# Patient Record
Sex: MALE | Race: WHITE | Employment: UNEMPLOYED | URBAN - METROPOLITAN AREA
[De-identification: names, ages, dates, MRNs, and addresses within clinical notes are randomized per-mention and may not be internally consistent; named-entity substitution may affect disease eponyms.]

---

## 2018-03-09 ENCOUNTER — HOSPITAL ENCOUNTER (OUTPATIENT)
Dept: OTHER | Age: 83
Discharge: HOME | End: 2018-03-09
Attending: INTERNAL MEDICINE | Admitting: INTERNAL MEDICINE

## 2018-03-09 DIAGNOSIS — M53.3 SACRAL PAIN: ICD-10-CM

## 2019-07-11 ENCOUNTER — HOSPITAL ENCOUNTER (INPATIENT)
Age: 84
LOS: 9 days | Discharge: HOME | DRG: 949 | End: 2019-07-20
Attending: PHYSICAL MEDICINE & REHABILITATION | Admitting: PHYSICAL MEDICINE & REHABILITATION
Payer: MEDICARE

## 2019-07-11 PROBLEM — S14.129A CENTRAL CORD SYND AT UNSP LEVEL OF CERV SPINAL CORD, INIT (HCC): Status: ACTIVE | Noted: 2019-07-11

## 2019-07-11 LAB
GLUCOSE BLD-MCNC: 127 MG/DL (ref 70–99)
GLUCOSE BLD-MCNC: 231 MG/DL (ref 70–99)
PERFORMED ON: ABNORMAL
PERFORMED ON: ABNORMAL

## 2019-07-11 PROCEDURE — 6370000000 HC RX 637 (ALT 250 FOR IP): Performed by: PHYSICAL MEDICINE & REHABILITATION

## 2019-07-11 PROCEDURE — 6360000002 HC RX W HCPCS: Performed by: PHYSICAL MEDICINE & REHABILITATION

## 2019-07-11 PROCEDURE — 1280000000 HC REHAB R&B

## 2019-07-11 RX ORDER — HYDROCHLOROTHIAZIDE 25 MG/1
25 TABLET ORAL DAILY
COMMUNITY

## 2019-07-11 RX ORDER — DEXTROSE MONOHYDRATE 50 MG/ML
100 INJECTION, SOLUTION INTRAVENOUS PRN
Status: DISCONTINUED | OUTPATIENT
Start: 2019-07-11 | End: 2019-07-20 | Stop reason: HOSPADM

## 2019-07-11 RX ORDER — SIMVASTATIN 40 MG
80 TABLET ORAL NIGHTLY
Status: DISCONTINUED | OUTPATIENT
Start: 2019-07-11 | End: 2019-07-20 | Stop reason: HOSPADM

## 2019-07-11 RX ORDER — CITALOPRAM 20 MG/1
20 TABLET ORAL DAILY
Status: DISCONTINUED | OUTPATIENT
Start: 2019-07-12 | End: 2019-07-20 | Stop reason: HOSPADM

## 2019-07-11 RX ORDER — NICOTINE POLACRILEX 4 MG
15 LOZENGE BUCCAL PRN
Status: DISCONTINUED | OUTPATIENT
Start: 2019-07-11 | End: 2019-07-20 | Stop reason: HOSPADM

## 2019-07-11 RX ORDER — POLYETHYLENE GLYCOL 3350 17 G/17G
17 POWDER, FOR SOLUTION ORAL DAILY PRN
Status: DISCONTINUED | OUTPATIENT
Start: 2019-07-11 | End: 2019-07-17

## 2019-07-11 RX ORDER — ASPIRIN 325 MG
325 TABLET ORAL DAILY
COMMUNITY
End: 2021-12-24

## 2019-07-11 RX ORDER — ALBUTEROL SULFATE 2.5 MG/3ML
2.5 SOLUTION RESPIRATORY (INHALATION) 2 TIMES DAILY PRN
COMMUNITY

## 2019-07-11 RX ORDER — METHOCARBAMOL 750 MG/1
750 TABLET, FILM COATED ORAL 3 TIMES DAILY PRN
Status: DISCONTINUED | OUTPATIENT
Start: 2019-07-11 | End: 2019-07-20 | Stop reason: HOSPADM

## 2019-07-11 RX ORDER — SENNA AND DOCUSATE SODIUM 50; 8.6 MG/1; MG/1
1 TABLET, FILM COATED ORAL 2 TIMES DAILY
COMMUNITY
End: 2021-12-24

## 2019-07-11 RX ORDER — METOPROLOL SUCCINATE 25 MG/1
25 TABLET, EXTENDED RELEASE ORAL DAILY
Status: DISCONTINUED | OUTPATIENT
Start: 2019-07-11 | End: 2019-07-20 | Stop reason: HOSPADM

## 2019-07-11 RX ORDER — LANOLIN ALCOHOL/MO/W.PET/CERES
3 CREAM (GRAM) TOPICAL NIGHTLY PRN
COMMUNITY

## 2019-07-11 RX ORDER — VALSARTAN 80 MG/1
80 TABLET ORAL DAILY
Status: DISCONTINUED | OUTPATIENT
Start: 2019-07-12 | End: 2019-07-13

## 2019-07-11 RX ORDER — CHLORHEXIDINE GLUCONATE 0.12 MG/ML
15 RINSE ORAL 2 TIMES DAILY
Status: ON HOLD | COMMUNITY
End: 2019-07-11

## 2019-07-11 RX ORDER — CANDESARTAN 32 MG/1
32 TABLET ORAL NIGHTLY
Status: ON HOLD | COMMUNITY
End: 2019-07-19 | Stop reason: HOSPADM

## 2019-07-11 RX ORDER — PIOGLITAZONEHYDROCHLORIDE 45 MG/1
45 TABLET ORAL DAILY
Status: ON HOLD | COMMUNITY
End: 2019-07-11

## 2019-07-11 RX ORDER — OXYCODONE HYDROCHLORIDE AND ACETAMINOPHEN 5; 325 MG/1; MG/1
2 TABLET ORAL EVERY 4 HOURS PRN
Status: ON HOLD | COMMUNITY
End: 2019-07-19 | Stop reason: HOSPADM

## 2019-07-11 RX ORDER — DOCUSATE SODIUM 100 MG/1
100 CAPSULE, LIQUID FILLED ORAL 2 TIMES DAILY
Status: DISCONTINUED | OUTPATIENT
Start: 2019-07-11 | End: 2019-07-20 | Stop reason: HOSPADM

## 2019-07-11 RX ORDER — OXYCODONE HYDROCHLORIDE 5 MG/1
5 TABLET ORAL EVERY 6 HOURS PRN
Status: DISCONTINUED | OUTPATIENT
Start: 2019-07-11 | End: 2019-07-20 | Stop reason: HOSPADM

## 2019-07-11 RX ORDER — HYDROCHLOROTHIAZIDE 25 MG/1
25 TABLET ORAL DAILY
Status: DISCONTINUED | OUTPATIENT
Start: 2019-07-12 | End: 2019-07-20 | Stop reason: HOSPADM

## 2019-07-11 RX ORDER — AMLODIPINE BESYLATE 10 MG/1
10 TABLET ORAL NIGHTLY
Status: DISCONTINUED | OUTPATIENT
Start: 2019-07-11 | End: 2019-07-20 | Stop reason: HOSPADM

## 2019-07-11 RX ORDER — LANOLIN ALCOHOL/MO/W.PET/CERES
3 CREAM (GRAM) TOPICAL NIGHTLY PRN
Status: DISCONTINUED | OUTPATIENT
Start: 2019-07-11 | End: 2019-07-20 | Stop reason: HOSPADM

## 2019-07-11 RX ORDER — METHOCARBAMOL 500 MG/1
500-1000 TABLET, FILM COATED ORAL EVERY 6 HOURS PRN
Status: ON HOLD | COMMUNITY
End: 2019-07-19 | Stop reason: SDUPTHER

## 2019-07-11 RX ORDER — ATENOLOL 50 MG/1
50 TABLET ORAL DAILY
Status: ON HOLD | COMMUNITY
End: 2019-07-11

## 2019-07-11 RX ORDER — TRAMADOL HYDROCHLORIDE 50 MG/1
50 TABLET ORAL EVERY 6 HOURS PRN
Status: DISCONTINUED | OUTPATIENT
Start: 2019-07-11 | End: 2019-07-11

## 2019-07-11 RX ORDER — AMLODIPINE BESYLATE 10 MG/1
10 TABLET ORAL NIGHTLY
COMMUNITY

## 2019-07-11 RX ORDER — HEPARIN SODIUM 5000 [USP'U]/ML
5000 INJECTION, SOLUTION INTRAVENOUS; SUBCUTANEOUS EVERY 8 HOURS SCHEDULED
Status: DISCONTINUED | OUTPATIENT
Start: 2019-07-11 | End: 2019-07-20 | Stop reason: HOSPADM

## 2019-07-11 RX ORDER — DEXTROSE MONOHYDRATE 25 G/50ML
12.5 INJECTION, SOLUTION INTRAVENOUS PRN
Status: DISCONTINUED | OUTPATIENT
Start: 2019-07-11 | End: 2019-07-20 | Stop reason: HOSPADM

## 2019-07-11 RX ORDER — OXYCODONE HYDROCHLORIDE 10 MG/1
10 TABLET ORAL EVERY 6 HOURS PRN
Status: DISCONTINUED | OUTPATIENT
Start: 2019-07-11 | End: 2019-07-20 | Stop reason: HOSPADM

## 2019-07-11 RX ORDER — BISACODYL 10 MG
10 SUPPOSITORY, RECTAL RECTAL DAILY PRN
Status: DISCONTINUED | OUTPATIENT
Start: 2019-07-11 | End: 2019-07-20 | Stop reason: HOSPADM

## 2019-07-11 RX ORDER — CITALOPRAM 20 MG/1
20 TABLET ORAL DAILY
COMMUNITY

## 2019-07-11 RX ORDER — ACETAMINOPHEN 325 MG/1
650 TABLET ORAL EVERY 6 HOURS PRN
Status: DISCONTINUED | OUTPATIENT
Start: 2019-07-11 | End: 2019-07-20 | Stop reason: HOSPADM

## 2019-07-11 RX ORDER — SIMVASTATIN 80 MG
80 TABLET ORAL NIGHTLY
COMMUNITY

## 2019-07-11 RX ORDER — ONDANSETRON 4 MG/1
4 TABLET, FILM COATED ORAL EVERY 8 HOURS PRN
Status: DISCONTINUED | OUTPATIENT
Start: 2019-07-11 | End: 2019-07-20 | Stop reason: HOSPADM

## 2019-07-11 RX ADMIN — HEPARIN SODIUM 5000 UNITS: 5000 INJECTION INTRAVENOUS; SUBCUTANEOUS at 22:02

## 2019-07-11 RX ADMIN — OXYCODONE HYDROCHLORIDE 5 MG: 5 TABLET ORAL at 22:10

## 2019-07-11 RX ADMIN — METOPROLOL TARTRATE 12.5 MG: 25 TABLET ORAL at 22:09

## 2019-07-11 RX ADMIN — OXYCODONE HYDROCHLORIDE 5 MG: 5 TABLET ORAL at 15:30

## 2019-07-11 RX ADMIN — SIMVASTATIN 80 MG: 40 TABLET, FILM COATED ORAL at 22:10

## 2019-07-11 RX ADMIN — AMLODIPINE BESYLATE 10 MG: 10 TABLET ORAL at 22:09

## 2019-07-11 RX ADMIN — DOCUSATE SODIUM 100 MG: 100 CAPSULE, LIQUID FILLED ORAL at 22:10

## 2019-07-11 RX ADMIN — HEPARIN SODIUM 5000 UNITS: 5000 INJECTION INTRAVENOUS; SUBCUTANEOUS at 15:30

## 2019-07-11 RX ADMIN — INSULIN LISPRO 1 UNITS: 100 INJECTION, SOLUTION INTRAVENOUS; SUBCUTANEOUS at 22:02

## 2019-07-11 ASSESSMENT — PAIN DESCRIPTION - PAIN TYPE
TYPE: SURGICAL PAIN
TYPE: ACUTE PAIN

## 2019-07-11 ASSESSMENT — PAIN DESCRIPTION - LOCATION
LOCATION: NECK
LOCATION: HEAD

## 2019-07-11 ASSESSMENT — PAIN DESCRIPTION - PROGRESSION
CLINICAL_PROGRESSION: NOT CHANGED

## 2019-07-11 ASSESSMENT — PAIN SCALES - GENERAL
PAINLEVEL_OUTOF10: 0
PAINLEVEL_OUTOF10: 4
PAINLEVEL_OUTOF10: 5
PAINLEVEL_OUTOF10: 5

## 2019-07-11 ASSESSMENT — PAIN DESCRIPTION - ORIENTATION
ORIENTATION: RIGHT
ORIENTATION: LOWER

## 2019-07-11 ASSESSMENT — PAIN DESCRIPTION - ONSET
ONSET: ON-GOING
ONSET: ON-GOING

## 2019-07-11 ASSESSMENT — PAIN DESCRIPTION - FREQUENCY
FREQUENCY: CONTINUOUS
FREQUENCY: CONTINUOUS

## 2019-07-11 NOTE — PROGRESS NOTES
Patient admitted to room 3259. Patient was oriented to the Call Light, Phone, TV, Thermostat, Bed Controls, Bathroom and Emergency Cord. Patient verbalized and demonstrated understanding of all. Patient was also given an over view of Unit Routines for Acute Rehab, including what to wear for therapy. The patient's role in goal setting was reviewed along with an explanation of the Interdisciplinary Team meeting, the 's role in coordinating services and the Discharge Planning/Continuum of Care process. Patient Rights and Responsibilities were reviewed. Meal times were explained, including how to order food. The white board (used for communication) was pointed out emphasizing  the 3 hours/day Therapy Schedule (posted most evenings), the number (and process) for reporting grievances, and the Doctor's, Nurse's, and PCA's names. It was recommended that any family that will be care givers or any care givers the patient has, take part in therapy. There are no set visiting hours, and it was suggested that non-caregiver friends and family visitors come after therapy (at 4 PM or later) to allow patient to rest in between sessions.

## 2019-07-11 NOTE — PROGRESS NOTES
RN called  Dr Mary Cuevas office to clarify orders for [facility] J collar. RN notified Dr Kory York how orders were written in discharge orders were correct and patient notified. [place] J collar is place if patient HOB >30 or up and ambulating or in chair. Patient may remove collar if HOB is less than 30 degrees.

## 2019-07-11 NOTE — H&P
Department of Physical Medicine & Rehabilitation  History & Physical      Patient Identification:  Katy Heart  : 1930  Admit date: 2019   Attending provider: Elisabet Kern MD        Primary care provider: Kenrick Gunderson MD     Chief Complaint: RUE weakness    History of Present Illness/Hospital Course:  Mr. Katy Heart is an 79 yo M with pmh HTN, HLD, and DM2 who initially presented to [address] [place] [address] on 2019 with upper extremity weakness/paresthesias and urinary retention following a ground level fall. Patient was working in garden when he lost his balance and fall, striking his head on a step, resulting in hyperextension at the neck. He denied loss of consciousness. Imaging revealed type II dens fracture with 7mm posterior displacement and cord impingement, C1 left posterior arch and laminar fractures, and suspected dissection/thrombus in right vertebral artery V3 segment. He underwent occiput-C4 fusion ( with Dr. Mary Cuevas). Post-op course notable for pulmonary edema requiring diuresis, new onset dysphagia, and significant troponin elevation/possible NSTEMI (managed medically). Now presents to ARU with impaired mobility and self-care below his baseline. Currently, he reports improvement in upper extremity weakness/paresthesias. He feels his LUE has returned to baseline but he has persistent abnormal sensation in the right hand, weakness throughout the right arm, and difficulty with fine motor coordination. He reports poor balance but no focal leg weakness or numbness. He has moderate neck pain with radiation to the right shoulder. Described mostly as \"tightness. \" He is urinating volitionally but reports frequency. He has not had BM in at least 2 days.  He is motivated to start inpatient rehab program.     Prior Level of Function:  Independent for mobility, ADLs, and IADLs    Current Level of Function:  Min-total assist    Pertinent Social History:  Support: Lives with wife (can provide  capsule 100 mg  100 mg Oral BID Amara Daniels MD        polyethylene glycol [facility]) packet 17 g  17 g Oral Daily PRN Amara Daniels MD        magnesium hydroxide (MILK OF MAGNESIA) 400 MG/5ML suspension 30 mL  30 mL Oral Daily PRN Amara Daniels MD        oxyCODONE (ROXICODONE) immediate release tablet 5 mg  5 mg Oral Q6H PRN Amara Daniels MD        oxyCODONE HCl (OXY-IR) immediate release tablet 10 mg  10 mg Oral Q6H PRN Amara Daniels MD        methocarbamol (ROBAXIN) tablet 750 mg  750 mg Oral TID PRN Aamra Daniels MD             REVIEW OF SYSTEMS:   CONSTITUTIONAL: negative for fevers, chills, diaphoresis, appetite change, night sweats, unexpected weight change, +fatigue. EYES: negative for blurred vision, eye discharge, visual disturbance and icterus. HEENT: negative for hearing loss, tinnitus, ear drainage, sinus pressure, nasal congestion, epistaxis and snoring. RESPIRATORY: Negative for hemoptysis, cough, sputum production. CARDIOVASCULAR: negative for chest pain, palpitations, exertional chest pressure/discomfort, syncope, +edema   GASTROINTESTINAL: negative for nausea, vomiting, diarrhea, blood in stool, abdominal pain, +constipation. GENITOURINARY: refer to HPI  HEMATOLOGIC/LYMPHATIC: negative for easy bruising, bleeding and lymphadenopathy. ALLERGIC/IMMUNOLOGIC: negative for recurrent infections, angioedema, anaphylaxis and drug reactions. ENDOCRINE: negative for weight changes and diabetic symptoms including polyuria, polydipsia and polyphagia. MUSCULOSKELETAL: refer to HPI  NEUROLOGICAL: refer to HPI  PSYCHIATRIC/BEHAVIORAL: negative for hallucinations, behavioral problems, confusion and agitation. All pertinent positives are noted in the HPI.     Physical Examination:  Vitals:   Patient Vitals for the past 24 hrs:   BP Temp Temp src Pulse Resp SpO2 Height Weight   07/11/19 1515 (!) 163/65 97.4 °F (36.3 °C) Oral 57 16 93 % 6' 1\" (1.854 m) 191 lb function was normal. The estimated ejection    fraction was in the range of 50% to 55%. The tissue Doppler    parameters were abnormal. The study is not technically sufficient    to allow evaluation of LV diastolic function. Evidence of elevated    left ventricular end diastolic pressure.  - Regional wall motion abnormality: Mild hypokinesis of the mid    anteroseptal myocardium.  - Aortic valve: Mild regurgitation.  - Aortic root: The aortic root was normal in size. The ascending  Edwin Broaden is at the upper limit of normal at 3.5 cm. - Left atrium: The atrium was mildly dilated. No right to left shunt    with contrast.  - Right ventricle: The cavity size was at the upper limits of    normal. Systolic function was normal. TAPSE: 2.4cm.  - Right atrium: The atrium was mildly dilated. - Pulmonary arteries: Systolic pressure could not be accurately    estimated. - Inferior vena cava: The vessel was dilated. - Pericardium, extracardiac: There was a left pleural effusion. EKG    NORMAL SINUS RHYTHM ^ NONSPECIFIC ST-T WAVE CHANGES ^ ABNORMAL ECG ^ COMPARED TO THE ECG OF 09-JUL-2019 06:28, ^ THERE IS NO SIGNIFICANT CHANGE ^ Confirmed by Lindy Bynum MD ([phone]) on 7/10/2019 9:46:46 AM      Body mass index is 25.22 kg/m². POST ADMISSION PHYSICIAN EVALUATION  The patient has agreed to being admitted to our comprehensive inpatient  rehabilitation facility consisting of at least 180 minutes of therapy a day,  5 out of 7 days a week. The patient/family has a good understanding of our discharge process. The  patient has potential to make improvement and is in need of at least two of  the following multidisciplinary therapies including but not limited to  physical, occupational, respiratory, and speech, nutritional services, wound care, and prosthetics and orthotics.  Given the patients complex condition  and risk of further medical complications, rehabilitation services cannot be  safely provided at a lower level of care BID, PRN miralax and MoM. follow bowel movements. Enema or suppository if needed. Pain control - prn Percocet and methocarbamol    Safety - fall and aspiration precautions    PPx  DVT PPx - heparin    FULL CODE    Luis Cifuentes MD 7/11/2019, 3:26 PM

## 2019-07-12 LAB
ANION GAP SERPL CALCULATED.3IONS-SCNC: 10 MMOL/L (ref 3–16)
BASOPHILS ABSOLUTE: 0.1 K/UL (ref 0–0.2)
BASOPHILS RELATIVE PERCENT: 1.1 %
BUN BLDV-MCNC: 29 MG/DL (ref 7–20)
CALCIUM SERPL-MCNC: 9.1 MG/DL (ref 8.3–10.6)
CHLORIDE BLD-SCNC: 100 MMOL/L (ref 99–110)
CO2: 27 MMOL/L (ref 21–32)
CREAT SERPL-MCNC: 0.8 MG/DL (ref 0.8–1.3)
EOSINOPHILS ABSOLUTE: 0.8 K/UL (ref 0–0.6)
EOSINOPHILS RELATIVE PERCENT: 8.7 %
GFR AFRICAN AMERICAN: >60
GFR NON-AFRICAN AMERICAN: >60
GLUCOSE BLD-MCNC: 150 MG/DL (ref 70–99)
GLUCOSE BLD-MCNC: 157 MG/DL (ref 70–99)
GLUCOSE BLD-MCNC: 161 MG/DL (ref 70–99)
GLUCOSE BLD-MCNC: 174 MG/DL (ref 70–99)
GLUCOSE BLD-MCNC: 249 MG/DL (ref 70–99)
HCT VFR BLD CALC: 40.4 % (ref 40.5–52.5)
HEMOGLOBIN: 13.5 G/DL (ref 13.5–17.5)
LYMPHOCYTES ABSOLUTE: 1.3 K/UL (ref 1–5.1)
LYMPHOCYTES RELATIVE PERCENT: 14.1 %
MCH RBC QN AUTO: 31.4 PG (ref 26–34)
MCHC RBC AUTO-ENTMCNC: 33.5 G/DL (ref 31–36)
MCV RBC AUTO: 93.8 FL (ref 80–100)
MONOCYTES ABSOLUTE: 0.9 K/UL (ref 0–1.3)
MONOCYTES RELATIVE PERCENT: 9.4 %
NEUTROPHILS ABSOLUTE: 6.2 K/UL (ref 1.7–7.7)
NEUTROPHILS RELATIVE PERCENT: 66.7 %
PDW BLD-RTO: 12.6 % (ref 12.4–15.4)
PERFORMED ON: ABNORMAL
PLATELET # BLD: 275 K/UL (ref 135–450)
PMV BLD AUTO: 7.7 FL (ref 5–10.5)
POTASSIUM REFLEX MAGNESIUM: 4 MMOL/L (ref 3.5–5.1)
PREALBUMIN: 14.6 MG/DL (ref 20–40)
RBC # BLD: 4.3 M/UL (ref 4.2–5.9)
SODIUM BLD-SCNC: 137 MMOL/L (ref 136–145)
WBC # BLD: 9.3 K/UL (ref 4–11)

## 2019-07-12 PROCEDURE — 92523 SPEECH SOUND LANG COMPREHEN: CPT

## 2019-07-12 PROCEDURE — 97110 THERAPEUTIC EXERCISES: CPT

## 2019-07-12 PROCEDURE — 80048 BASIC METABOLIC PNL TOTAL CA: CPT

## 2019-07-12 PROCEDURE — 1280000000 HC REHAB R&B

## 2019-07-12 PROCEDURE — 97162 PT EVAL MOD COMPLEX 30 MIN: CPT

## 2019-07-12 PROCEDURE — 94760 N-INVAS EAR/PLS OXIMETRY 1: CPT

## 2019-07-12 PROCEDURE — 92526 ORAL FUNCTION THERAPY: CPT

## 2019-07-12 PROCEDURE — 97535 SELF CARE MNGMENT TRAINING: CPT

## 2019-07-12 PROCEDURE — 97530 THERAPEUTIC ACTIVITIES: CPT

## 2019-07-12 PROCEDURE — 84134 ASSAY OF PREALBUMIN: CPT

## 2019-07-12 PROCEDURE — 36415 COLL VENOUS BLD VENIPUNCTURE: CPT

## 2019-07-12 PROCEDURE — 6370000000 HC RX 637 (ALT 250 FOR IP): Performed by: PHYSICAL MEDICINE & REHABILITATION

## 2019-07-12 PROCEDURE — 97116 GAIT TRAINING THERAPY: CPT

## 2019-07-12 PROCEDURE — 6360000002 HC RX W HCPCS: Performed by: PHYSICAL MEDICINE & REHABILITATION

## 2019-07-12 PROCEDURE — 92610 EVALUATE SWALLOWING FUNCTION: CPT

## 2019-07-12 PROCEDURE — 97166 OT EVAL MOD COMPLEX 45 MIN: CPT

## 2019-07-12 PROCEDURE — 85025 COMPLETE CBC W/AUTO DIFF WBC: CPT

## 2019-07-12 RX ORDER — METFORMIN HYDROCHLORIDE EXTENDED-RELEASE TABLETS 1000 MG/1
1000 TABLET, FILM COATED, EXTENDED RELEASE ORAL
COMMUNITY

## 2019-07-12 RX ORDER — METOPROLOL SUCCINATE 50 MG/1
50 TABLET, EXTENDED RELEASE ORAL DAILY
COMMUNITY
End: 2022-10-27

## 2019-07-12 RX ORDER — METFORMIN HYDROCHLORIDE 500 MG/1
1000 TABLET, EXTENDED RELEASE ORAL
Status: DISCONTINUED | OUTPATIENT
Start: 2019-07-12 | End: 2019-07-20 | Stop reason: HOSPADM

## 2019-07-12 RX ADMIN — DOCUSATE SODIUM 100 MG: 100 CAPSULE, LIQUID FILLED ORAL at 20:08

## 2019-07-12 RX ADMIN — METOPROLOL SUCCINATE 25 MG: 25 TABLET, FILM COATED, EXTENDED RELEASE ORAL at 07:33

## 2019-07-12 RX ADMIN — AMLODIPINE BESYLATE 10 MG: 10 TABLET ORAL at 20:08

## 2019-07-12 RX ADMIN — HEPARIN SODIUM 5000 UNITS: 5000 INJECTION INTRAVENOUS; SUBCUTANEOUS at 12:25

## 2019-07-12 RX ADMIN — ASPIRIN 325 MG: 325 TABLET, DELAYED RELEASE ORAL at 07:33

## 2019-07-12 RX ADMIN — Medication 3 MG: at 22:21

## 2019-07-12 RX ADMIN — METHOCARBAMOL TABLETS 750 MG: 750 TABLET, COATED ORAL at 17:16

## 2019-07-12 RX ADMIN — INSULIN LISPRO 1 UNITS: 100 INJECTION, SOLUTION INTRAVENOUS; SUBCUTANEOUS at 21:09

## 2019-07-12 RX ADMIN — VALSARTAN 80 MG: 80 TABLET, FILM COATED ORAL at 11:33

## 2019-07-12 RX ADMIN — INSULIN LISPRO 1 UNITS: 100 INJECTION, SOLUTION INTRAVENOUS; SUBCUTANEOUS at 07:34

## 2019-07-12 RX ADMIN — SIMVASTATIN 80 MG: 40 TABLET, FILM COATED ORAL at 20:08

## 2019-07-12 RX ADMIN — INSULIN LISPRO 1 UNITS: 100 INJECTION, SOLUTION INTRAVENOUS; SUBCUTANEOUS at 17:13

## 2019-07-12 RX ADMIN — DOCUSATE SODIUM 100 MG: 100 CAPSULE, LIQUID FILLED ORAL at 07:33

## 2019-07-12 RX ADMIN — METHOCARBAMOL TABLETS 750 MG: 750 TABLET, COATED ORAL at 05:51

## 2019-07-12 RX ADMIN — HEPARIN SODIUM 5000 UNITS: 5000 INJECTION INTRAVENOUS; SUBCUTANEOUS at 05:46

## 2019-07-12 RX ADMIN — METFORMIN HYDROCHLORIDE 1000 MG: 500 TABLET, EXTENDED RELEASE ORAL at 07:34

## 2019-07-12 RX ADMIN — HEPARIN SODIUM 5000 UNITS: 5000 INJECTION INTRAVENOUS; SUBCUTANEOUS at 21:09

## 2019-07-12 RX ADMIN — CITALOPRAM HYDROBROMIDE 20 MG: 20 TABLET ORAL at 07:34

## 2019-07-12 RX ADMIN — INSULIN LISPRO 1 UNITS: 100 INJECTION, SOLUTION INTRAVENOUS; SUBCUTANEOUS at 12:25

## 2019-07-12 RX ADMIN — OXYCODONE HYDROCHLORIDE 10 MG: 10 TABLET ORAL at 20:09

## 2019-07-12 RX ADMIN — HYDROCHLOROTHIAZIDE 25 MG: 25 TABLET ORAL at 07:34

## 2019-07-12 ASSESSMENT — 9 HOLE PEG TEST
TESTTIME_SECONDS: 30.35
TEST_RESULT: IMPAIRED
TESTTIME_SECONDS: 33.2
TEST_RESULT: FUNCTIONAL

## 2019-07-12 ASSESSMENT — PAIN DESCRIPTION - LOCATION: LOCATION: HEAD;SHOULDER

## 2019-07-12 ASSESSMENT — PAIN DESCRIPTION - FREQUENCY: FREQUENCY: CONTINUOUS

## 2019-07-12 ASSESSMENT — PAIN - FUNCTIONAL ASSESSMENT: PAIN_FUNCTIONAL_ASSESSMENT: ACTIVITIES ARE NOT PREVENTED

## 2019-07-12 ASSESSMENT — PAIN DESCRIPTION - PROGRESSION
CLINICAL_PROGRESSION: NOT CHANGED

## 2019-07-12 ASSESSMENT — PAIN DESCRIPTION - PAIN TYPE: TYPE: ACUTE PAIN

## 2019-07-12 ASSESSMENT — PAIN DESCRIPTION - ONSET: ONSET: ON-GOING

## 2019-07-12 ASSESSMENT — PAIN SCALES - GENERAL: PAINLEVEL_OUTOF10: 8

## 2019-07-12 ASSESSMENT — PAIN DESCRIPTION - ORIENTATION: ORIENTATION: RIGHT

## 2019-07-12 NOTE — PROGRESS NOTES
cervical restrictions and wearing neck brace    3. Pt appears clinical comparable to previous DX Dysphagia work up documentation of findings. This SLP will review the MBSS documentation again to be sure. Dysphagia Outcome Severity Scale: Level 3: Moderate dysphagia- Total assistance, supervision or strategies. Two or more diet consistencies restricted     Treatment Plan  Requires SLP Intervention: Yes  Duration/Frequency of Treatment: 5 times/week while on rehab unit and additional recommendations at d/c may be warranted   D/C Recommendations: To be determined(anticipate potential need for therapy at d/c)       Recommended Diet and Intervention  Diet Solids Recommendation: Dysphagia II Mechanically Altered  Liquid Consistency Recommendation: Nectar     Recommendations: Dysphagia treatment  Therapeutic Interventions: Bolus control exercises; Laryngeal exercises; Patient/Family education; Therapeutic PO trials with SLP; Diet tolerance monitoring;Linda; Tongue base strengthening;Effortful swallow    Compensatory Swallowing Strategies  Alternate solids and liquids;Upright as possible for all oral intake;Small bites/sips;Swallow 2 times per bite/sip; Remain upright for 30-45 minutes after meals;Effortful swallow; Modified supraglottic swallow     Treatment/Goals  Dysphagia Goals: The patient will tolerate dysphagia II mechanical soft with nectar thick diet without observed clinical signs of aspiration; The patient will improve oral preparation phase via bolus control/manipulation exercises to 5/5 each trial.;  The patient will recall and perform compensatory strategies, with no cues. The patient will complete tongue base and laryngeal elevation exercises 15/15;    The patient will tolerate skilled therapy trials of thin liquids and/or dental soft foods with compensatory strategies 15/15 without clinical overt s/s of penetration/aspiration    General  Chart Reviewed: Yes  Subjective  Subjective: Pt is insightful to

## 2019-07-12 NOTE — PROGRESS NOTES
function and limitations: history per pt self-report and chart review   General  Chart Reviewed: Yes  Family / Caregiver Present: No  Subjective  Subjective: Pt was alert and cooperative. Pt was receptive to testing but did report some fatigue. Social/Functional History  Lives With: Spouse  ADL Assistance: (split with wife)  Homemaking Assistance: (split with wife)  Homemaking Responsibilities: Yes(split with wife)  Occupation: Retired  Type of occupation:   IADL Comments: Pt involved in [employer], [address] [place] [address], cleaning and splits these responsibilities with his wife. Objective: Assessment included portions of the NCSE and a Cognitive Linguistic test    Pain:  No pain complaints     Vision  Vision: Impaired  Vision Exceptions: Wears glasses for reading  Hearing  Hearing: Within functional limits          Auditory Comprehension  Comprehension: Exceptions  Yes/No Questions: (intact)  Basic Questions: (intact)  One Step Basic Commands: (intact)  Two Step Basic Commands: (intact)  Multistep Basic Commands: (intact)  Complex/Abstract Commands: (can be impulsive but able to follow commands)    Reading Comprehension  Reading Status: Within functional limits(Ongoing monitor)  Sentence Impairment Severity: (at the 4-6 line paragraph level. )    Expression  Primary Mode of Expression: Verbal    Verbal Expression  Verbal Expression: Exceptions to functional limits  Confrontation: (15/15 short form Ministerio [employer] Ministerio)  Conversation: (IND for communicating needs and wants, able to provide complex concert explanations with intermittent clarification cues)    Written Expression  Dominant Hand: Right    Motor Speech  Motor Speech:  Within Functional Limits  Oral/Motor  Oral Motor: Exceptions to [facility]  Labial Symmetry: (fairly symmetrical)  Labial Strength: (appeared functional and symmetrical)  Labial Coordination: Reduced  Lingual ROM: (appeared fairly symmetrical)  Lingual Coordination: Reduced  Velum: (appeared symmetrical. Pt self reported his uvulae \"just hung there and had lots of mucous there\")      Pragmatics/Social Functioning  Pragmatics: Within functional limits    Cognition:    Orientation  Overall Orientation Status: Within Normal Limits(Orientation via NSCE is [facility])  Attention  Attention: Exceptions to [facility]  Alternating Attention: Mild(Ongoing assessment )  Divided Attention: Mild(Ongoing assessment )  Selective Attention: (intact on testing)  Sustained Attention: (intact on testing)  Memory  Memory: Exceptions to [facility]  Daily Routines: To be assessed in therapy  People Encountered: To be assessed in therapy  Short-term Memory: (NSCE WFL. Ongoing assessment )  Working Memory: (intact on testing)  Problem Solving  Problem Solving: Exceptions to [facility]  Complex Functional Tasks: Mild(NSCE visual constructional abilities: mild )  Managing Medications: To be assessed in therapy  Performing Discharge Planning: To be assessed in therapy  Simple Functional Tasks: (verbal intact but ongoing assessment of complex)  Verbal Reasoning Skills: To be assessed in therapy(NSCE score WFL)  Numeric Reasoning  Numeric Reasoning: Exceptions to [facility]   Calculations: (NSCE calculations [facility])  Money Management: To be assessed in therapy  Time: To be assessed in therapy  Abstract Reasoning  Abstract Reasoning: Exceptions to [facility]  Convergent Thinking: (appeared [facility] on testing)  Divergent Thinking: To be assessed in therapy  Safety/Judgement  Safety/Judgement: (To be assessed. Impulsivity noted throughout the session)    Additional Assessments:   Pt has restrictions and is wearing cervical brace        Prognosis:  Excellent    Education:  Patient Education Response: Verbalizes understanding  Safety Devices in place: Yes  Type of devices:  All fall risk precautions in place         Therapy Time:   Individual   Time In [address] [place] [address]   Time Out 1545   Minutes [facility] [facility] Graduate SLP Student    This Speech Language Pathologist was present directed the patient's care, made skilled judgement and was responsible for assessment and treatment. Bart Vegas M.S., [facility]-SLP #[id number]  Speech Language Pathologist     7/12/2019 5:25 PM

## 2019-07-12 NOTE — PROGRESS NOTES
: Yes  Mode of Transportation: Car  Occupation: Retired, Volunteer work  Type of occupation: . 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 Leisure & Hobbies: has not had much time for hobbies d/t very involved with volunteer work. Pt likes to mow the grass (3 acres). Sings in a choir. Additional Comments: Pt reports his wife leans on him when walking, he gets items for pt. He does help wife into car prn, but wife is able to drive. Pt denies any other recent falls, did fall about a year ago (fell backwards against a tree). Pt says he fell into a honeysuckle branch ~3 years ago. Cognition   Cognition  Overall Cognitive Status: WNL    Objective     Observation/Palpation  Posture: Good  Observation: óscar SUAREZ brace to cervical spine  Scar: wound close anterior/superior head but tender to touch right side with light palpation     AROM RLE (degrees)  RLE AROM: WFL  RLE General AROM: slight tightness with bilateral hamstring but able to complete with increased time   AROM LLE (degrees)  LLE AROM : WFL  LLE General AROM: slight tightness with bilateral hamstring but able to complete with increased time   AROM RUE (degrees)  RUE AROM : WFL  RUE General AROM: increased time to perform, slight limitations with shoulder elevation   AROM LUE (degrees)  LUE AROM : WFL  Strength RLE  Strength RLE: WFL  Comment: MMT performed support sitting in chair: hip flexion 4/5, hip adduction 5/5, hip abduction 4+/5, knee extension 4+/5, knee flexion 5/5, ankle DF 5/5, ankle PF 5/5. Strength LLE  Strength LLE: WFL  Comment: MMT performed support sitting in chair: hip flexion 4/5, hip adduction 5/5, hip abduction 4+/5, knee extension 4+/5, knee flexion 5/5, ankle DF 5/5, ankle PF 5/5.    Strength RUE  Strength RUE: WFL  Strength LUE  Strength LUE: WFL  Tone RLE  RLE Tone: Normotonic  Tone LLE  LLE Tone: Normotonic  Motor Control  Gross Motor?: WFL  Coordination  Rapid Alternating 3: ambulate with LRAD 200' mod I   Short term goal 4: ascend/descend 12 steps mod I with bilateral railing   Short term goal 5: ascend/descend curb step LRAD mod I   Long term goals  Time Frame for Long term goals : STG=LTG   Patient Goals   Patient goals : \"walk, be able to walk into bathroom/toilet by myself, free from brace and be normal\"       Therapy Time   Individual Concurrent Group Co-treatment   Time In 0930         Time Out 1045         Minutes 75         Timed Code Treatment Minutes: [address], PT     Electronically signed by Dami Gibson PT on 7/12/19 at 12:55 PM

## 2019-07-12 NOTE — PROGRESS NOTES
fracture. Occiput-C4 fusion 7/6  Subjective  Subjective: Pt met in room, seated EOB eating breakfast. Pt pleasant and agreeable to OT eval/ADL. Pt reports \"moderate pain caused by the brace. \"  General Comment  Comments: Pt goes by \"Vinay. \"    Social/Functional History  Social/Functional History  Lives With: Spouse(wife Merly Veliz)- has \"a walking diability. She uses crutches or a walker. \" Pt typically helps his wife with retrieving items around the house.)  Type of Home: House  Home Layout: Two level, Performs ADL's on one level, Able to Live on Main level with bedroom/bathroom(pt has a workshop in the basement (railing on right if descending))  Home Access: Stairs to enter with rails  Entrance Stairs - Number of Steps: \"a lot, half a flight to the deck, then a few steps rising up. There's an abundance of railings on both side, all over the place. \"  Entrance Stairs - Rails: Both  Bathroom Shower/Tub: Walk-in shower, Curtain, Shower chair with back(small lip to step over. Grab bar outside the shower)  Bathroom Toilet: Handicap height(no bars or vanity next to toilet. Windowsill on the right and toilet seat on left. \"Senior level height\")  Bathroom Equipment: Built-in shower seat, Shower chair(wife has a shower chair)  Bathroom Accessibility: Walker accessible, Wheelchair accessible, Accessible(2 bathrooms connected)  Home Equipment: Rolling walker, Crutches, Reacher(pt's wife has 2 RWs so pt says he could use 1. Wife has 3 sets of forearm crutches. Wife has a reacher. Pt plans to get life alert button. Sleeps in regular bed. Sits in a swivel lounging chair that is also a lift chair.)  ADL Assistance: Independent  Homemaking Assistance: Independent(does over 50% of cooking, cooks breakfast. Wife does laundry, shares heavy cleaning & wife does light cleaning. Pt manages meds & finances. Pt grocery shops.  No pets.)  Homemaking Responsibilities: Yes(split with wife)  Ambulation Assistance: Independent  Transfer Assistance: Independent  Active : Yes  Mode of Transportation: Car  Occupation: Retired, Volunteer work  Type of occupation: . 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 Leisure & Hobbies: has not had much time for hobbies d/t very involved with volunteer work. Pt likes to mow the grass (3 acres). Sings in a choir. IADL Comments: Pt involved in money making, cooking, cleaning and splits these responsibilities with his wife. Additional Comments: Pt reports his wife leans on him when walking, he gets items for pt. He does help wife into car prn, but wife is able to drive. Pt denies any other recent falls, did fall about a year ago (fell backwards against a tree). Pt says he fell into a honeysuckle branch ~3 years ago. Objective   Vision: Within Functional Limits  Vision Exceptions: Wears glasses for reading  Hearing: Within functional limits      Orientation  Overall Orientation Status: Within Functional Limits        Balance  Sitting Balance: Modified independent   Standing Balance: Contact guard assistance(RW for UB support)    Functional Mobility  Functional - Mobility Device: Rolling Walker  Activity: To/from bathroom  Assist Level: Contact guard assistance  Functional Mobility Comments: short distances in room to participate in ADLs. Pt with no LOB. PM: longer distances in dept to participate in fxl transers, steady no LOB. Toilet Transfers  Toilet - Technique: Ambulating  Equipment Used: Grab bars  Toilet Transfer: Contact guard assistance  Toilet Transfers Comments: AM session: via RW: comfort height commode. VCs for safe hand placement. Pt used grab bar on right to sit and reached with left hand back to toilet seat. Shower Transfers  Shower Transfers: Not tested  [address] [facility] Transfers Comments: LENKA this date due to pt did not have extra pads to change wet [place] J. Pt completed sponge bath this date.     Wheelchair Bed Transfers  Wheelchair/Bed - Technique:

## 2019-07-12 NOTE — PROGRESS NOTES
Potassium reflex Magnesium 4.0 3.5 - 5.1 mmol/L    Chloride 100 99 - 110 mmol/L    CO2 27 21 - 32 mmol/L    Anion Gap 10 3 - 16    Glucose 161 (H) 70 - 99 mg/dL    BUN 29 (H) 7 - 20 mg/dL    CREATININE 0.8 0.8 - 1.3 mg/dL    GFR Non-African American >60 >60    GFR African American >60 >60    Calcium 9.1 8.3 - 10.6 mg/dL   Prealbumin    Collection Time: 07/12/19  7:19 AM   Result Value Ref Range    Prealbumin 14.6 (L) 20.0 - 40.0 mg/dL   CBC auto differential    Collection Time: 07/12/19  7:19 AM   Result Value Ref Range    WBC 9.3 4.0 - 11.0 K/uL    RBC 4.30 4.20 - 5.90 M/uL    Hemoglobin 13.5 13.5 - 17.5 g/dL    Hematocrit 40.4 (L) 40.5 - 52.5 %    MCV 93.8 80.0 - 100.0 fL    MCH 31.4 26.0 - 34.0 pg    MCHC 33.5 31.0 - 36.0 g/dL    RDW 12.6 12.4 - 15.4 %    Platelets 303 790 - 789 K/uL    MPV 7.7 5.0 - 10.5 fL    Neutrophils % 66.7 %    Lymphocytes % 14.1 %    Monocytes % 9.4 %    Eosinophils % 8.7 %    Basophils % 1.1 %    Neutrophils # 6.2 1.7 - 7.7 K/uL    Lymphocytes # 1.3 1.0 - 5.1 K/uL    Monocytes # 0.9 0.0 - 1.3 K/uL    Eosinophils # 0.8 (H) 0.0 - 0.6 K/uL    Basophils # 0.1 0.0 - 0.2 K/uL   POCT Glucose    Collection Time: 07/12/19  7:24 AM   Result Value Ref Range    POC Glucose 157 (H) 70 - 99 mg/dl    Performed on ACCU-CHEK        Therapy progress:  PT  Required Braces or Orthoses  Cervical: [place] J(\"[place] J collar when HOB > 30 degreess and when Out of bed, ambulatory. \" \"If given a collar, expect to wear the collar for 6 weeks\")  Position Activity Restriction  Spinal Precautions: No Lifting, No Bending, No Twisting(\"no heavy lifting over 10 pounds\" \"no lifting of objects over your head\"  \"No bending at the waistELY [facility] with your knees. \")  Spinal Precautions: \"Walking is encoruaged. No other form of exercises until follow up appointment. \"  Other position/activity restrictions: diet: dysphagia II mechanically altered, nectar thick. \"[place] J collar when OOB or if HOB elevated >30 degrees. \" Per RN note MD 7/12/2019, 9:08 AM

## 2019-07-13 LAB
GLUCOSE BLD-MCNC: 132 MG/DL (ref 70–99)
GLUCOSE BLD-MCNC: 143 MG/DL (ref 70–99)
GLUCOSE BLD-MCNC: 150 MG/DL (ref 70–99)
GLUCOSE BLD-MCNC: 250 MG/DL (ref 70–99)
PERFORMED ON: ABNORMAL

## 2019-07-13 PROCEDURE — 97116 GAIT TRAINING THERAPY: CPT

## 2019-07-13 PROCEDURE — 94760 N-INVAS EAR/PLS OXIMETRY 1: CPT

## 2019-07-13 PROCEDURE — 97535 SELF CARE MNGMENT TRAINING: CPT

## 2019-07-13 PROCEDURE — 6370000000 HC RX 637 (ALT 250 FOR IP): Performed by: PHYSICAL MEDICINE & REHABILITATION

## 2019-07-13 PROCEDURE — 97530 THERAPEUTIC ACTIVITIES: CPT

## 2019-07-13 PROCEDURE — 6360000002 HC RX W HCPCS: Performed by: PHYSICAL MEDICINE & REHABILITATION

## 2019-07-13 PROCEDURE — 97110 THERAPEUTIC EXERCISES: CPT

## 2019-07-13 PROCEDURE — 1280000000 HC REHAB R&B

## 2019-07-13 RX ADMIN — OXYCODONE HYDROCHLORIDE 5 MG: 5 TABLET ORAL at 21:46

## 2019-07-13 RX ADMIN — METOPROLOL SUCCINATE 25 MG: 25 TABLET, FILM COATED, EXTENDED RELEASE ORAL at 07:51

## 2019-07-13 RX ADMIN — DOCUSATE SODIUM 100 MG: 100 CAPSULE, LIQUID FILLED ORAL at 21:46

## 2019-07-13 RX ADMIN — SIMVASTATIN 80 MG: 40 TABLET, FILM COATED ORAL at 21:46

## 2019-07-13 RX ADMIN — HEPARIN SODIUM 5000 UNITS: 5000 INJECTION INTRAVENOUS; SUBCUTANEOUS at 05:15

## 2019-07-13 RX ADMIN — HYDROCHLOROTHIAZIDE 25 MG: 25 TABLET ORAL at 07:52

## 2019-07-13 RX ADMIN — INSULIN LISPRO 1 UNITS: 100 INJECTION, SOLUTION INTRAVENOUS; SUBCUTANEOUS at 17:47

## 2019-07-13 RX ADMIN — INSULIN LISPRO 2 UNITS: 100 INJECTION, SOLUTION INTRAVENOUS; SUBCUTANEOUS at 21:48

## 2019-07-13 RX ADMIN — ACETAMINOPHEN 650 MG: 325 TABLET ORAL at 12:44

## 2019-07-13 RX ADMIN — CITALOPRAM HYDROBROMIDE 20 MG: 20 TABLET ORAL at 07:50

## 2019-07-13 RX ADMIN — METFORMIN HYDROCHLORIDE 1000 MG: 500 TABLET, EXTENDED RELEASE ORAL at 07:48

## 2019-07-13 RX ADMIN — AMLODIPINE BESYLATE 10 MG: 10 TABLET ORAL at 21:47

## 2019-07-13 RX ADMIN — HEPARIN SODIUM 5000 UNITS: 5000 INJECTION INTRAVENOUS; SUBCUTANEOUS at 13:38

## 2019-07-13 RX ADMIN — OXYCODONE HYDROCHLORIDE 5 MG: 5 TABLET ORAL at 13:44

## 2019-07-13 RX ADMIN — INSULIN LISPRO 1 UNITS: 100 INJECTION, SOLUTION INTRAVENOUS; SUBCUTANEOUS at 07:39

## 2019-07-13 RX ADMIN — HEPARIN SODIUM 5000 UNITS: 5000 INJECTION INTRAVENOUS; SUBCUTANEOUS at 21:47

## 2019-07-13 RX ADMIN — ACETAMINOPHEN 650 MG: 325 TABLET ORAL at 19:10

## 2019-07-13 RX ADMIN — ASPIRIN 325 MG: 325 TABLET, DELAYED RELEASE ORAL at 07:50

## 2019-07-13 RX ADMIN — DOCUSATE SODIUM 100 MG: 100 CAPSULE, LIQUID FILLED ORAL at 07:50

## 2019-07-13 RX ADMIN — METHOCARBAMOL TABLETS 750 MG: 750 TABLET, COATED ORAL at 10:40

## 2019-07-13 ASSESSMENT — PAIN DESCRIPTION - LOCATION
LOCATION: NECK;SHOULDER
LOCATION: NECK;SHOULDER
LOCATION: NECK
LOCATION: SHOULDER
LOCATION: SHOULDER
LOCATION: NECK
LOCATION: HEAD
LOCATION: NECK

## 2019-07-13 ASSESSMENT — PAIN SCALES - GENERAL
PAINLEVEL_OUTOF10: 6
PAINLEVEL_OUTOF10: 5
PAINLEVEL_OUTOF10: 0
PAINLEVEL_OUTOF10: 5
PAINLEVEL_OUTOF10: 1
PAINLEVEL_OUTOF10: 0
PAINLEVEL_OUTOF10: 3
PAINLEVEL_OUTOF10: 2
PAINLEVEL_OUTOF10: 3
PAINLEVEL_OUTOF10: 0
PAINLEVEL_OUTOF10: 6
PAINLEVEL_OUTOF10: 5
PAINLEVEL_OUTOF10: 3

## 2019-07-13 ASSESSMENT — PAIN DESCRIPTION - DESCRIPTORS
DESCRIPTORS: ACHING
DESCRIPTORS: HEAVINESS
DESCRIPTORS: SPASM

## 2019-07-13 ASSESSMENT — PAIN DESCRIPTION - PAIN TYPE
TYPE: ACUTE PAIN
TYPE: SURGICAL PAIN

## 2019-07-13 ASSESSMENT — PAIN DESCRIPTION - ORIENTATION
ORIENTATION: RIGHT
ORIENTATION: RIGHT

## 2019-07-14 LAB
GLUCOSE BLD-MCNC: 138 MG/DL (ref 70–99)
GLUCOSE BLD-MCNC: 143 MG/DL (ref 70–99)
GLUCOSE BLD-MCNC: 171 MG/DL (ref 70–99)
GLUCOSE BLD-MCNC: 204 MG/DL (ref 70–99)
PERFORMED ON: ABNORMAL

## 2019-07-14 PROCEDURE — 6370000000 HC RX 637 (ALT 250 FOR IP): Performed by: PHYSICAL MEDICINE & REHABILITATION

## 2019-07-14 PROCEDURE — 1280000000 HC REHAB R&B

## 2019-07-14 PROCEDURE — 6360000002 HC RX W HCPCS: Performed by: PHYSICAL MEDICINE & REHABILITATION

## 2019-07-14 RX ADMIN — INSULIN LISPRO 1 UNITS: 100 INJECTION, SOLUTION INTRAVENOUS; SUBCUTANEOUS at 21:41

## 2019-07-14 RX ADMIN — HEPARIN SODIUM 5000 UNITS: 5000 INJECTION INTRAVENOUS; SUBCUTANEOUS at 21:41

## 2019-07-14 RX ADMIN — INSULIN LISPRO 1 UNITS: 100 INJECTION, SOLUTION INTRAVENOUS; SUBCUTANEOUS at 18:17

## 2019-07-14 RX ADMIN — INSULIN LISPRO 1 UNITS: 100 INJECTION, SOLUTION INTRAVENOUS; SUBCUTANEOUS at 07:32

## 2019-07-14 RX ADMIN — OXYCODONE HYDROCHLORIDE 5 MG: 5 TABLET ORAL at 22:34

## 2019-07-14 RX ADMIN — AMLODIPINE BESYLATE 10 MG: 10 TABLET ORAL at 21:41

## 2019-07-14 RX ADMIN — METHOCARBAMOL TABLETS 750 MG: 750 TABLET, COATED ORAL at 21:41

## 2019-07-14 RX ADMIN — Medication 3 MG: at 21:41

## 2019-07-14 RX ADMIN — HEPARIN SODIUM 5000 UNITS: 5000 INJECTION INTRAVENOUS; SUBCUTANEOUS at 13:51

## 2019-07-14 RX ADMIN — ASPIRIN 325 MG: 325 TABLET, DELAYED RELEASE ORAL at 08:54

## 2019-07-14 RX ADMIN — CITALOPRAM HYDROBROMIDE 20 MG: 20 TABLET ORAL at 08:53

## 2019-07-14 RX ADMIN — HEPARIN SODIUM 5000 UNITS: 5000 INJECTION INTRAVENOUS; SUBCUTANEOUS at 06:01

## 2019-07-14 RX ADMIN — METFORMIN HYDROCHLORIDE 1000 MG: 500 TABLET, EXTENDED RELEASE ORAL at 07:36

## 2019-07-14 RX ADMIN — OXYCODONE HYDROCHLORIDE 5 MG: 5 TABLET ORAL at 09:47

## 2019-07-14 RX ADMIN — SIMVASTATIN 80 MG: 40 TABLET, FILM COATED ORAL at 21:41

## 2019-07-14 RX ADMIN — DOCUSATE SODIUM 100 MG: 100 CAPSULE, LIQUID FILLED ORAL at 21:41

## 2019-07-14 RX ADMIN — ACETAMINOPHEN 650 MG: 325 TABLET ORAL at 06:48

## 2019-07-14 RX ADMIN — OXYCODONE HYDROCHLORIDE 5 MG: 5 TABLET ORAL at 21:41

## 2019-07-14 RX ADMIN — DOCUSATE SODIUM 100 MG: 100 CAPSULE, LIQUID FILLED ORAL at 08:53

## 2019-07-14 RX ADMIN — ONDANSETRON HYDROCHLORIDE 4 MG: 4 TABLET, FILM COATED ORAL at 18:25

## 2019-07-14 RX ADMIN — METHOCARBAMOL TABLETS 750 MG: 750 TABLET, COATED ORAL at 07:36

## 2019-07-14 ASSESSMENT — PAIN SCALES - GENERAL
PAINLEVEL_OUTOF10: 7
PAINLEVEL_OUTOF10: 5
PAINLEVEL_OUTOF10: 3
PAINLEVEL_OUTOF10: 2
PAINLEVEL_OUTOF10: 0
PAINLEVEL_OUTOF10: 0
PAINLEVEL_OUTOF10: 3
PAINLEVEL_OUTOF10: 5
PAINLEVEL_OUTOF10: 3
PAINLEVEL_OUTOF10: 0
PAINLEVEL_OUTOF10: 4

## 2019-07-14 ASSESSMENT — PAIN DESCRIPTION - LOCATION
LOCATION: NECK
LOCATION: SHOULDER;NECK
LOCATION: NECK
LOCATION: SHOULDER

## 2019-07-14 ASSESSMENT — PAIN DESCRIPTION - DESCRIPTORS
DESCRIPTORS: ACHING
DESCRIPTORS: THROBBING
DESCRIPTORS: HEAVINESS
DESCRIPTORS: ACHING;HEAVINESS

## 2019-07-14 ASSESSMENT — PAIN DESCRIPTION - PAIN TYPE
TYPE: ACUTE PAIN

## 2019-07-14 ASSESSMENT — PAIN DESCRIPTION - ORIENTATION
ORIENTATION: RIGHT;LEFT
ORIENTATION: RIGHT;LEFT

## 2019-07-14 NOTE — PROGRESS NOTES
Pt sitting up in chair c/o 5/10 pain to his neck and requesting pain medication. Medicated with Tylenol per prn orders. Will monitor.

## 2019-07-14 NOTE — PROGRESS NOTES
Pt sitting up in chair requesting to use the BR and then get into bed. Pt s/p spinal fusion after fall in garden. C1-C4 fusion at [facility] by DR. Ej Fernández. [place] J collar in place. Pt is on Dysphagia II and nectar thick liquids diet. Lungs clear. No sob or cough. On RA. Belly round and soft with active BS. LBM yesterday. Voids urine without difficulty. 1+ edema to lower legs. Pt up from chair to BR with CGA and walker. Pt voided yellow urine and then to bed. Toilets with CGA. HOB 45 degrees. Call light in reach. Bed alarm on.

## 2019-07-15 LAB
ANION GAP SERPL CALCULATED.3IONS-SCNC: 13 MMOL/L (ref 3–16)
BASOPHILS ABSOLUTE: 0.1 K/UL (ref 0–0.2)
BASOPHILS RELATIVE PERCENT: 0.8 %
BUN BLDV-MCNC: 41 MG/DL (ref 7–20)
CALCIUM SERPL-MCNC: 8.9 MG/DL (ref 8.3–10.6)
CHLORIDE BLD-SCNC: 98 MMOL/L (ref 99–110)
CO2: 27 MMOL/L (ref 21–32)
CREAT SERPL-MCNC: 1.1 MG/DL (ref 0.8–1.3)
EOSINOPHILS ABSOLUTE: 0.9 K/UL (ref 0–0.6)
EOSINOPHILS RELATIVE PERCENT: 9.4 %
GFR AFRICAN AMERICAN: >60
GFR NON-AFRICAN AMERICAN: >60
GLUCOSE BLD-MCNC: 120 MG/DL (ref 70–99)
GLUCOSE BLD-MCNC: 122 MG/DL (ref 70–99)
GLUCOSE BLD-MCNC: 124 MG/DL (ref 70–99)
GLUCOSE BLD-MCNC: 135 MG/DL (ref 70–99)
GLUCOSE BLD-MCNC: 188 MG/DL (ref 70–99)
HCT VFR BLD CALC: 38.3 % (ref 40.5–52.5)
HEMOGLOBIN: 13.2 G/DL (ref 13.5–17.5)
LYMPHOCYTES ABSOLUTE: 1.9 K/UL (ref 1–5.1)
LYMPHOCYTES RELATIVE PERCENT: 20.5 %
MCH RBC QN AUTO: 32.1 PG (ref 26–34)
MCHC RBC AUTO-ENTMCNC: 34.5 G/DL (ref 31–36)
MCV RBC AUTO: 93.1 FL (ref 80–100)
MONOCYTES ABSOLUTE: 0.7 K/UL (ref 0–1.3)
MONOCYTES RELATIVE PERCENT: 7 %
NEUTROPHILS ABSOLUTE: 5.9 K/UL (ref 1.7–7.7)
NEUTROPHILS RELATIVE PERCENT: 62.3 %
PDW BLD-RTO: 12.7 % (ref 12.4–15.4)
PERFORMED ON: ABNORMAL
PLATELET # BLD: 318 K/UL (ref 135–450)
PMV BLD AUTO: 8 FL (ref 5–10.5)
POTASSIUM REFLEX MAGNESIUM: 3.9 MMOL/L (ref 3.5–5.1)
RBC # BLD: 4.11 M/UL (ref 4.2–5.9)
SODIUM BLD-SCNC: 138 MMOL/L (ref 136–145)
WBC # BLD: 9.5 K/UL (ref 4–11)

## 2019-07-15 PROCEDURE — 97110 THERAPEUTIC EXERCISES: CPT

## 2019-07-15 PROCEDURE — 97530 THERAPEUTIC ACTIVITIES: CPT

## 2019-07-15 PROCEDURE — 36415 COLL VENOUS BLD VENIPUNCTURE: CPT

## 2019-07-15 PROCEDURE — 92526 ORAL FUNCTION THERAPY: CPT

## 2019-07-15 PROCEDURE — 97535 SELF CARE MNGMENT TRAINING: CPT

## 2019-07-15 PROCEDURE — 97127 HC SP THER IVNTJ W/FOCUS COG FUNCJ: CPT

## 2019-07-15 PROCEDURE — 97116 GAIT TRAINING THERAPY: CPT

## 2019-07-15 PROCEDURE — 6360000002 HC RX W HCPCS: Performed by: PHYSICAL MEDICINE & REHABILITATION

## 2019-07-15 PROCEDURE — 85025 COMPLETE CBC W/AUTO DIFF WBC: CPT

## 2019-07-15 PROCEDURE — 94760 N-INVAS EAR/PLS OXIMETRY 1: CPT

## 2019-07-15 PROCEDURE — 1280000000 HC REHAB R&B

## 2019-07-15 PROCEDURE — 80048 BASIC METABOLIC PNL TOTAL CA: CPT

## 2019-07-15 PROCEDURE — 6370000000 HC RX 637 (ALT 250 FOR IP): Performed by: PHYSICAL MEDICINE & REHABILITATION

## 2019-07-15 RX ADMIN — METHOCARBAMOL TABLETS 750 MG: 750 TABLET, COATED ORAL at 08:44

## 2019-07-15 RX ADMIN — HEPARIN SODIUM 5000 UNITS: 5000 INJECTION INTRAVENOUS; SUBCUTANEOUS at 05:50

## 2019-07-15 RX ADMIN — OXYCODONE HYDROCHLORIDE 10 MG: 10 TABLET ORAL at 21:00

## 2019-07-15 RX ADMIN — INSULIN LISPRO 1 UNITS: 100 INJECTION, SOLUTION INTRAVENOUS; SUBCUTANEOUS at 20:59

## 2019-07-15 RX ADMIN — Medication 3 MG: at 21:00

## 2019-07-15 RX ADMIN — HEPARIN SODIUM 5000 UNITS: 5000 INJECTION INTRAVENOUS; SUBCUTANEOUS at 13:09

## 2019-07-15 RX ADMIN — SIMVASTATIN 80 MG: 40 TABLET, FILM COATED ORAL at 20:20

## 2019-07-15 RX ADMIN — ACETAMINOPHEN 650 MG: 325 TABLET ORAL at 08:43

## 2019-07-15 RX ADMIN — AMLODIPINE BESYLATE 10 MG: 10 TABLET ORAL at 20:21

## 2019-07-15 RX ADMIN — METOPROLOL SUCCINATE 25 MG: 25 TABLET, FILM COATED, EXTENDED RELEASE ORAL at 08:44

## 2019-07-15 RX ADMIN — HEPARIN SODIUM 5000 UNITS: 5000 INJECTION INTRAVENOUS; SUBCUTANEOUS at 20:59

## 2019-07-15 RX ADMIN — HYDROCHLOROTHIAZIDE 25 MG: 25 TABLET ORAL at 08:44

## 2019-07-15 RX ADMIN — METFORMIN HYDROCHLORIDE 1000 MG: 500 TABLET, EXTENDED RELEASE ORAL at 08:43

## 2019-07-15 RX ADMIN — METHOCARBAMOL TABLETS 750 MG: 750 TABLET, COATED ORAL at 21:00

## 2019-07-15 RX ADMIN — DOCUSATE SODIUM 100 MG: 100 CAPSULE, LIQUID FILLED ORAL at 08:44

## 2019-07-15 RX ADMIN — CITALOPRAM HYDROBROMIDE 20 MG: 20 TABLET ORAL at 08:44

## 2019-07-15 RX ADMIN — ASPIRIN 325 MG: 325 TABLET, DELAYED RELEASE ORAL at 08:44

## 2019-07-15 RX ADMIN — DOCUSATE SODIUM 100 MG: 100 CAPSULE, LIQUID FILLED ORAL at 20:20

## 2019-07-15 ASSESSMENT — PAIN DESCRIPTION - PROGRESSION
CLINICAL_PROGRESSION: NOT CHANGED
CLINICAL_PROGRESSION: NOT CHANGED

## 2019-07-15 ASSESSMENT — PAIN SCALES - GENERAL
PAINLEVEL_OUTOF10: 7
PAINLEVEL_OUTOF10: 1
PAINLEVEL_OUTOF10: 3
PAINLEVEL_OUTOF10: 4

## 2019-07-15 ASSESSMENT — PAIN DESCRIPTION - DESCRIPTORS
DESCRIPTORS: ACHING
DESCRIPTORS: ACHING;DISCOMFORT

## 2019-07-15 ASSESSMENT — PAIN DESCRIPTION - FREQUENCY
FREQUENCY: CONTINUOUS
FREQUENCY: CONTINUOUS

## 2019-07-15 ASSESSMENT — PAIN DESCRIPTION - PAIN TYPE
TYPE: ACUTE PAIN
TYPE: ACUTE PAIN;SURGICAL PAIN

## 2019-07-15 ASSESSMENT — PAIN DESCRIPTION - LOCATION
LOCATION: NECK
LOCATION: NECK

## 2019-07-15 ASSESSMENT — PAIN - FUNCTIONAL ASSESSMENT: PAIN_FUNCTIONAL_ASSESSMENT: ACTIVITIES ARE NOT PREVENTED

## 2019-07-15 ASSESSMENT — PAIN DESCRIPTION - ONSET
ONSET: ON-GOING
ONSET: ON-GOING

## 2019-07-15 NOTE — PROGRESS NOTES
baseline. Currently, he reports improvement in upper extremity weakness/paresthesias. He feels his LUE has returned to baseline but he has persistent abnormal sensation in the right hand, weakness throughout the right arm, and difficulty with fine motor coordination. He reports poor balance but no focal leg weakness or numbness. He has moderate neck pain with radiation to the right shoulder. Described mostly as \"tightness. \"\" ([place] 7/12/19)  Response To Previous Treatment: Patient with no complaints from previous session. Family / Caregiver Present: No  Referring Practitioner: Dr. Deisy Mathis: Pt reports pain 2/10 this morning with neck but improving daily, states feeling well. Pt agreeable to PT treatment session at this time          Orientation  Orientation  Overall Orientation Status: Within Normal Limits  Objective      Transfers  Sit to Stand: Stand by assistance(increased time to complete with cues for safety due to flexed posture/slight posterior lean)  Stand to sit: Stand by assistance(cues for eccentric descent for safety with standing to sit )  Ambulation  Ambulation?: Yes  Ambulation 1  Surface: level tile  Device: Rolling Walker  Assistance: Modified Independent;Supervision  Quality of Gait: pushes RW slightly too far forward over threshold with VC for safety with uneven surface, steady with increased karoline for ambulation using RW for slight steady assistance. Gait Deviations: Shuffles; Deviated path;Decreased head and trunk rotation; Slow Karoline  Distance: 350' with multiple turns, short distancs in therapy gym, 300' with two turns (increased complaints of fatigue especially with BUE, supervision cues for safe management throughout)  Comments: at this time better efficiency and tolerance with RW compared to without device   Ambulation 2  Surface - 2: level tile  Device 2: No device  Assistance 2: Stand by assistance  Quality of Gait 2: narrow DAIJA, increased guarding

## 2019-07-15 NOTE — PROGRESS NOTES
Occupational Therapy  Facility/Department: [address] REHAB  Daily Treatment Note  NAME: Katy Heart  : 1930  MRN: [id number]    Date of Service: 7/15/2019    Discharge Recommendations:  S Level 1, Home with assist PRN, Home with Home health OT       Assessment   Performance deficits / Impairments: Decreased functional mobility ; Decreased ADL status; Decreased endurance;Decreased balance;Decreased high-level IADLs  Assessment: Pt tolerated OT tx well, doing well with fxl transfers/mobility. Pt did c/o a \"pop\" to neck during bed mobility, though OT did not hear the pop, and pt maintained log roll during and had Power J donned. OT did alert pt's RN. Continue OT tx per POC. Treatment Diagnosis: impaired ADLs, IADLs, fxl transfers/mobility, endurance  Prognosis: Good  Patient Education: bed mobility, fxl transfers/mobility  REQUIRES OT FOLLOW UP: Yes  Activity Tolerance  Activity Tolerance: Patient Tolerated treatment well  Safety Devices  Safety Devices in place: Yes  Type of devices: Gait belt; Chair alarm in place; Left in chair;Call light within reach         Patient Diagnosis(es): There were no encounter diagnoses. has a past medical history of Ankylosing spondylitis ([facility] [address] Basal cell carcinoma, Diabetes mellitus type II, controlled ([facility] [address] Hyperlipidemia, Hypertension, and Varicella. has a past surgical history that includes TURP and cervical fusion. Restrictions  Restrictions/Precautions  Restrictions/Precautions: Fall Risk, Modified Diet, Swallowing - Thickened Liquids  Required Braces or Orthoses?: Yes  Required Braces or Orthoses  Cervical: miami J(\"Miami J collar when HOB > 30 degreess and when Out of bed, ambulatory. \" \"If given a collar, expect to wear the collar for 6 weeks\")  Position Activity Restriction  Spinal Precautions: No Lifting, No Bending, No Twisting(\"no heavy lifting over 10 pounds\" \"no lifting of objects over your head\"  \"No bending at the waistELY [facility] with your moderate neck pain with radiation to the right shoulder. Described mostly as \"tightness. \" He is urinating volitionally but reports frequency. He has not had BM in at least 2 days. He is motivated to start inpatient rehab program. \" PMH includes: ankylosing spondylitis, basal cell carcinoma, DM II, hyperlipidemia, , hypertension, varicella, TURP. Response to previous treatment: Patient with no complaints from previous session  Family / Caregiver Present: No  Referring Practitioner: Dr. Elizabeth Clemons  Diagnosis: ground level fall with hyperextension at neck and fracture. Occiput-C4 fusion 7/6  Subjective  Subjective: Pt met in dept, after PT session. Pt pleasant and agreeable to OT tx, reports pain 1/10 to neck from brace, describes as pressure and also reports pressure at trapezius. Pt hopeful for d/c EOW. General Comment  Comments: Pt goes by [employer]. \"           Objective    ADL  Additional Comments: Discussed [place] J with pt. He has needed assist to don/doff previously while in bed, discussed he may need assist at d/c from wife but will practice tomorrow. Balance  Sitting Balance: Modified independent   Standing Balance: (SBA/supervision)    Functional Mobility  Functional - Mobility Device: Rolling Walker  Activity: Other  Assist Level: Stand by assistance  Functional Mobility Comments: short distances in dept to participate in fxl transfers, no LOB and overall steady    Toilet Transfers  Toilet - Technique: Ambulating  Equipment Used: Raised toilet seat without rails  Toilet Transfer: Contact guard assistance;Stand by assistance  Toilet Transfers Comments: Via RW: BSC set to comfort heigh. At home, pt uses windowsill on right (at sink/vanity level), and toilet edge on left. Pt completed transfer 2x this date. CGA/SBA to sit. 1st trial CGA to stand d/c posterior lean, but CGA/SBA to stand 2nd trial w/o posterior lean. Pt reports toilet seat is wider at home and easier to reach.  OT did show pt the toilet waist/back. Discussed with pt to not reach overhead, and pt able to reach just to shoulder level to reach bowl, discussed not reaching any higher. Pt used RW basket to transport items. Pt prepared scrambled egg, good stovetop safety. OT cleaned up for pt afterwards d/t pt with increased fatigue/pain to shoulders. Overall, CGA. Community Re-entry: Reminded pt to not drive until cleared by MD, pt recalled prior discussion. Pt does still have paper in his room for 's eval if he feels he needs once ready to drive, as pt concerned about his neck mobility. Functional Mobility:  SBA via RW short distances in dept and kitchen. Pt steady, no LOB. Pt occasionally very quick with turns, yet no LOB. Functional Transfers (via RW): Wheelchair: SBA/supervision  Car: SBA via sit and swing technique. ADL:  Feeding: Pt drinks his nectar thick water mod I. Pt now allowed to have ice chips between meals, so OT provides and reminds pt to not drink the thin water if the ice melts. Pt receptive. OT does notify pt's RN that OT provided pt with ice chips.     Safety Device - Type of devices:  []  All fall risk precautions in place [] Bed alarm in place  [x] Call light within reach [x] Chair alarm in place [] Positioning belt [x] Gait belt [] Patient at risk for falls [] Left in bed [x] Left in chair [] Telesitter in use [] Sitter present [x] Nurse Prince Mann) notified []  None                                Plan   Plan  Times per week: 5-6x  Times per day: Twice a day  Plan weeks: 7-10 days (from 7/12/19)  Current Treatment Recommendations: Functional Mobility Training, Balance Training, Patient/Caregiver Education & Training, Self-Care / ADL, Equipment Evaluation, Education, & procurement, Home Management Training, Safety Education & Training, Endurance Training    Goals  Short term goals  Time Frame for Short term goals: 5 days (from 7/12/19)  Short term goal 1: Pt will complete fxl transfers/mobility to participate in ADLs

## 2019-07-15 NOTE — PROGRESS NOTES
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 Cognitive Diagnosis: Minimal to mild deficits in divided and alternating attention. Pt impulsive during testing. Planned further assessment as pt self reports particiaptes in home management, med management and health management. 2. Speech Diagnosis: Audible, intelligible, fluent speech. 3. Communication Diagnosis: [place] language skills intact. Pt with complaints of word finding but not demonstrated on CFN test or for test samples for concept definitions. Ongoing assessment with additional goals to be determined if warranted. Pt is expressing basic needs and wants and is providing concept descriptions/explanations at multiple word utterance level. 4. Dysphagia Diagnosis: Mild to Moderate Oropharyngeal dysphagia characterized by reduced lingual coordination for bolus control, concern for reduced tongue base retraction, delayed initiation trigger of swallow, reduced laryngeal elevation, concern for reduced pharyngeal clearance.  Pt had a MBSS at the other facility with [facility] with nectar thick liquids  Date of Admit: 2019  Room #: P7W-1532/3259-01  Date: 7/15/2019       Current functional status (updated daily):         Pt being seen for : [] Speech/Language Treatment  [x] Dysphagia Treatment [x] Cognitive Treatment  [] Other:  Current Diet Order:DIET GENERAL; Dysphagia II Mechanically Altered; wearing neck brace      Objective:  Goals       Pt goal is to get to a regular diet including thin water    Therapy working toward pt goal    Dysphagia Goals:  1. The patient will tolerate Dysphagia II mech soft with nectar thick liquid diet without observed clinical signs of aspiration,  Not targeted n/a   Goal 2: The patient will improve oral preparation phase via bolus control/manipulation exercises to 5/5 each trial.,  targeted indirectly with lingual ex; and with ice chip presentations n/a   Goal 3. The patient will recall and perform compensatory strategies, with no cues. Review warranted;   -ie upright positioning  -effortful swallow and modified supraglottic swallow  -clearance swallow between presentations n/a   Goal 4. The patient will complete tongue base and laryngeal elevation exercises 15/15;  Dyspahgia treatment  -tongue base retraction ex: x 10 for 2 reps with cues for target  -falsetto \"ee\": target of 10 second duration  -pitch variation low to high: set up and cues for target  -BOT with pitch variation low to high with falsetto hold: set up and cues for target  -avery: >50% success n/a   Goal 5.  The patient will tolerate skilled therapy trials of thin liquids and/r dental soft foods with compensatory strategies 15/15 without clinical overt s/s of penetration/aspiratio Ice chips: training in swallow strategy (volitional throat clear and clearance swallow) n/a     Cognitive-communication Goals: Get his ability to find words and thoughts back    Therapy working toward pt goal n/a   Goal 1: Pt will particiapte in further assessment of higher level cognitive linguistic skills for advanced daily living executive function    LINDA (norms for >72years of age)\  -Counting Money: score profile of '1' high probability of IND  - Solving Daily Math Problems: score profile of '1' high probability of IND  -Writing a Check and Balancing a Checkbook: score profile of '1' high probability of IND  -Understanding assessment and treatment. Martha Vegas,MS,CCC,SLP [id number]  Speech and Language Pathologist

## 2019-07-15 NOTE — PROGRESS NOTES
Time: 07/15/19  6:26 AM   Result Value Ref Range    WBC 9.5 4.0 - 11.0 K/uL    RBC 4.11 (L) 4.20 - 5.90 M/uL    Hemoglobin 13.2 (L) 13.5 - 17.5 g/dL    Hematocrit 38.3 (L) 40.5 - 52.5 %    MCV 93.1 80.0 - 100.0 fL    MCH 32.1 26.0 - 34.0 pg    MCHC 34.5 31.0 - 36.0 g/dL    RDW 12.7 12.4 - 15.4 %    Platelets 844 506 - 146 K/uL    MPV 8.0 5.0 - 10.5 fL    Neutrophils % 62.3 %    Lymphocytes % 20.5 %    Monocytes % 7.0 %    Eosinophils % 9.4 %    Basophils % 0.8 %    Neutrophils # 5.9 1.7 - 7.7 K/uL    Lymphocytes # 1.9 1.0 - 5.1 K/uL    Monocytes # 0.7 0.0 - 1.3 K/uL    Eosinophils # 0.9 (H) 0.0 - 0.6 K/uL    Basophils # 0.1 0.0 - 0.2 K/uL   Basic Metabolic Panel w/ Reflex to MG    Collection Time: 07/15/19  6:26 AM   Result Value Ref Range    Sodium 138 136 - 145 mmol/L    Potassium reflex Magnesium 3.9 3.5 - 5.1 mmol/L    Chloride 98 (L) 99 - 110 mmol/L    CO2 27 21 - 32 mmol/L    Anion Gap 13 3 - 16    Glucose 120 (H) 70 - 99 mg/dL    BUN 41 (H) 7 - 20 mg/dL    CREATININE 1.1 0.8 - 1.3 mg/dL    GFR Non-African American >60 >60    GFR African American >60 >60    Calcium 8.9 8.3 - 10.6 mg/dL   POCT Glucose    Collection Time: 07/15/19  7:43 AM   Result Value Ref Range    POC Glucose 122 (H) 70 - 99 mg/dl    Performed on ACCU-CHEK        Therapy progress:  PT  Required Braces or Orthoses  Cervical: miami J(\"[place] J collar when HOB > 30 degreess and when Out of bed, ambulatory. \" \"If given a collar, expect to wear the collar for 6 weeks\")  Position Activity Restriction  Spinal Precautions: No Lifting, No Bending, No Twisting(\"no heavy lifting over 10 pounds\" \"no lifting of objects over your head\"  \"No bending at the waistELY [facility] with your knees. \")  Spinal Precautions: \"Walking is encoruaged. No other form of exercises until follow up appointment. \"  Other position/activity restrictions: diet: dysphagia II mechanically altered, nectar thick. \"[place] J collar when OOB or if HOB elevated >30 degrees. \" Per RN note 7/11/19:

## 2019-07-15 NOTE — PROGRESS NOTES
Physical Therapy  Progress Note     Name:Vinay Murcia  :1930  WXB:[id number]  Room: [place] [id number]    Date of Service: 7/15/2019    Pt ambulating with PCA to bathroom upon arrival to room, offered assistance to get ready for speech therapy and in agreement at this time. Pt ambulated Supervision to bathroom with RW, standing in front of commode supervision for standing balance doffing pants, stand to sit supervision wit right grab bar, mod I for sitting balance/set up for silas care with collecting wipe, sit to stand supervision with right grab bar, donning pants supervision for standing balance, short ambulation in bathroom Supervision with RW, washing hands at sink supervision for standing balance, and ambulated short distance to bed with RW supervision. Nursing present for medication requesting change in cervical collar, pt states to sitting EOB supervision with cues for hand placement. HOB placed below 30 degrees, sitting to right side lying SBA increased time to perform, dependent for cervical collar change for time constraints in right side lying. Side lying to sitting mod I with increased time. Pt left sitting EOB with nursing present and speech therapy taking over at completion of session. Pt tolerated well without complaints.  (see treatment note later this date for assessment and POC)       Session Therapy Time     Individual Co-treatment   Time In 0830     Time Out 0845     Minutes 15                    Electronically signed by Mary Jo Msaon PT on 7/15/2019 at 9:08 AM

## 2019-07-16 LAB
BILIRUBIN URINE: NEGATIVE
BLOOD, URINE: ABNORMAL
CLARITY: ABNORMAL
COLOR: ABNORMAL
COMMENT UA: ABNORMAL
GLUCOSE BLD-MCNC: 123 MG/DL (ref 70–99)
GLUCOSE BLD-MCNC: 131 MG/DL (ref 70–99)
GLUCOSE BLD-MCNC: 134 MG/DL (ref 70–99)
GLUCOSE BLD-MCNC: 179 MG/DL (ref 70–99)
GLUCOSE URINE: NEGATIVE MG/DL
KETONES, URINE: NEGATIVE MG/DL
LEUKOCYTE ESTERASE, URINE: ABNORMAL
MICROSCOPIC EXAMINATION: YES
NITRITE, URINE: NEGATIVE
PERFORMED ON: ABNORMAL
PH UA: 6 (ref 5–8)
PROTEIN UA: 100 MG/DL
RBC UA: >100 /HPF (ref 0–2)
SPECIFIC GRAVITY UA: 1.02 (ref 1–1.03)
URINE REFLEX TO CULTURE: YES
URINE TYPE: ABNORMAL
UROBILINOGEN, URINE: 1 E.U./DL
WBC UA: ABNORMAL /HPF (ref 0–5)

## 2019-07-16 PROCEDURE — 87186 SC STD MICRODIL/AGAR DIL: CPT

## 2019-07-16 PROCEDURE — 97110 THERAPEUTIC EXERCISES: CPT

## 2019-07-16 PROCEDURE — 87077 CULTURE AEROBIC IDENTIFY: CPT

## 2019-07-16 PROCEDURE — 94760 N-INVAS EAR/PLS OXIMETRY 1: CPT

## 2019-07-16 PROCEDURE — 97116 GAIT TRAINING THERAPY: CPT

## 2019-07-16 PROCEDURE — 97530 THERAPEUTIC ACTIVITIES: CPT

## 2019-07-16 PROCEDURE — 6370000000 HC RX 637 (ALT 250 FOR IP): Performed by: PHYSICAL MEDICINE & REHABILITATION

## 2019-07-16 PROCEDURE — 97127 HC SP THER IVNTJ W/FOCUS COG FUNCJ: CPT

## 2019-07-16 PROCEDURE — 6360000002 HC RX W HCPCS: Performed by: PHYSICAL MEDICINE & REHABILITATION

## 2019-07-16 PROCEDURE — 51701 INSERT BLADDER CATHETER: CPT

## 2019-07-16 PROCEDURE — 87086 URINE CULTURE/COLONY COUNT: CPT

## 2019-07-16 PROCEDURE — 97535 SELF CARE MNGMENT TRAINING: CPT

## 2019-07-16 PROCEDURE — 81001 URINALYSIS AUTO W/SCOPE: CPT

## 2019-07-16 PROCEDURE — 1280000000 HC REHAB R&B

## 2019-07-16 PROCEDURE — 92526 ORAL FUNCTION THERAPY: CPT

## 2019-07-16 PROCEDURE — 51798 US URINE CAPACITY MEASURE: CPT

## 2019-07-16 RX ADMIN — CITALOPRAM HYDROBROMIDE 20 MG: 20 TABLET ORAL at 08:02

## 2019-07-16 RX ADMIN — SIMVASTATIN 80 MG: 40 TABLET, FILM COATED ORAL at 22:44

## 2019-07-16 RX ADMIN — DOCUSATE SODIUM 100 MG: 100 CAPSULE, LIQUID FILLED ORAL at 22:44

## 2019-07-16 RX ADMIN — METFORMIN HYDROCHLORIDE 1000 MG: 500 TABLET, EXTENDED RELEASE ORAL at 08:03

## 2019-07-16 RX ADMIN — HEPARIN SODIUM 5000 UNITS: 5000 INJECTION INTRAVENOUS; SUBCUTANEOUS at 21:38

## 2019-07-16 RX ADMIN — ACETAMINOPHEN 650 MG: 325 TABLET ORAL at 08:05

## 2019-07-16 RX ADMIN — ASPIRIN 325 MG: 325 TABLET, DELAYED RELEASE ORAL at 08:04

## 2019-07-16 RX ADMIN — ACETAMINOPHEN 650 MG: 325 TABLET ORAL at 22:44

## 2019-07-16 RX ADMIN — HEPARIN SODIUM 5000 UNITS: 5000 INJECTION INTRAVENOUS; SUBCUTANEOUS at 04:47

## 2019-07-16 RX ADMIN — METHOCARBAMOL TABLETS 750 MG: 750 TABLET, COATED ORAL at 22:50

## 2019-07-16 RX ADMIN — METOPROLOL SUCCINATE 25 MG: 25 TABLET, FILM COATED, EXTENDED RELEASE ORAL at 08:03

## 2019-07-16 RX ADMIN — HYDROCHLOROTHIAZIDE 25 MG: 25 TABLET ORAL at 08:03

## 2019-07-16 RX ADMIN — INSULIN LISPRO 1 UNITS: 100 INJECTION, SOLUTION INTRAVENOUS; SUBCUTANEOUS at 01:00

## 2019-07-16 RX ADMIN — MAGNESIUM HYDROXIDE 30 ML: 400 SUSPENSION ORAL at 21:38

## 2019-07-16 RX ADMIN — Medication 3 MG: at 22:43

## 2019-07-16 RX ADMIN — MAGNESIUM HYDROXIDE 30 ML: 400 SUSPENSION ORAL at 04:47

## 2019-07-16 ASSESSMENT — PAIN DESCRIPTION - ONSET
ONSET: ON-GOING
ONSET: ON-GOING

## 2019-07-16 ASSESSMENT — PAIN DESCRIPTION - ORIENTATION
ORIENTATION: LEFT
ORIENTATION: RIGHT;LEFT

## 2019-07-16 ASSESSMENT — PAIN DESCRIPTION - FREQUENCY
FREQUENCY: CONTINUOUS
FREQUENCY: CONTINUOUS

## 2019-07-16 ASSESSMENT — PAIN DESCRIPTION - LOCATION
LOCATION: NECK
LOCATION: NECK

## 2019-07-16 ASSESSMENT — PAIN DESCRIPTION - PAIN TYPE
TYPE: ACUTE PAIN;OTHER (COMMENT)
TYPE: ACUTE PAIN

## 2019-07-16 ASSESSMENT — PAIN SCALES - GENERAL
PAINLEVEL_OUTOF10: 0
PAINLEVEL_OUTOF10: 0
PAINLEVEL_OUTOF10: 1
PAINLEVEL_OUTOF10: 3
PAINLEVEL_OUTOF10: 0

## 2019-07-16 ASSESSMENT — PAIN DESCRIPTION - PROGRESSION
CLINICAL_PROGRESSION: NOT CHANGED
CLINICAL_PROGRESSION: NOT CHANGED

## 2019-07-16 ASSESSMENT — PAIN - FUNCTIONAL ASSESSMENT
PAIN_FUNCTIONAL_ASSESSMENT: ACTIVITIES ARE NOT PREVENTED
PAIN_FUNCTIONAL_ASSESSMENT: ACTIVITIES ARE NOT PREVENTED

## 2019-07-16 ASSESSMENT — PAIN DESCRIPTION - DESCRIPTORS
DESCRIPTORS: ACHING;DISCOMFORT
DESCRIPTORS: ACHING;DULL;DISCOMFORT

## 2019-07-16 NOTE — PROGRESS NOTES
12  Stairs Height: 6\"  Rails: Bilateral  Curbs: 2\"(X 1 trial without device SBA, slight unsteady without device but able to complete with increased time and VC for proper sequencing; slight anxiety not using device for first time)  Device: No Device  Assistance: Modified independent   Comment: reciprocal pattern for ascending/descend steps with bilateral railing, steady throughout without LOB and without concern. Balance  Sitting - Static: Good  Sitting - Dynamic: Good  Standing - Static: Good  Standing - Dynamic: Good;-  Comments: increased stability with ambulation and confiedence overall   Other exercises  Other exercises 1: NuStep 10 minutes, seat BLEs 10 only due to spinal surgery, level 3 resistance, average SPM: 48.                             PM session:   S: Pt sitting in w/c upon arrival to room, states bilateral shoulder feeling better ~0.5/10 pain at this time. Pt agreeable to PT treatment session. O: Car transfer: supervision for all transfer components (sit <-> stand w/c, w/c <-> car ~5', sit <-> stand car seat, swinging legs in/out mod I). Sit <-> w/c throughout session mod I. Ambulated without device 230' SBA with increased time, no LOB similar as above X 2 trials with multiple turns. Standing balloon toss 2 minutes alternating use of BUE with reaching outside DAIJA with SBA-CGA. Standing reaching into bucket with right hand on right side for bean bag, then throws at target ~ 8' away with 75% accuracy with SBA-CGA throughout. Increased time to perform without LOB. Standing parallel bars X 15 BLEs with supervision including B heel/toe raises, marching, and hip abduction with knee straight. Standing in parallel bars bilateral calf stretch on incline board 30\" X 2 trials with cues for proper sequencing, tolerated well with mild soreness. A: Pt tolerated PM session with mild increased fatigue but overall improving balance with all mobility tasks.  Without use of RW this date continue to progress

## 2019-07-16 NOTE — PROGRESS NOTES
w/c  Pt wearing neck brace      Objective:  Goals       Pt goal is to get to a regular diet including thin water    Therapy working toward pt goal    Dysphagia Goals:  1. The patient will tolerate Dysphagia II mech soft with nectar thick liquid diet without observed clinical signs of aspiration,  Not targeted-continue goal n/a   Goal 2: The patient will improve oral preparation phase via bolus control/manipulation exercises to 5/5 each trial.,  targeted indirectly with lingual ex; and with ice chip; tsp thin H20 and cup H20 presentations    Lingual ex emphasizing lingual elevation (anterior/posterior) and lingual shaping for lingual hold : x 15 each with varying     Upgrade goal to 15 reps n/a   Goal 3. The patient will recall and perform compensatory strategies, with no cues. Review provided using pictured/written stimuli   -ie upright positioning  -effortful swallow and modified supraglottic swallow  -clearance swallow between presentations    Continue goal n/a   Goal 4. The patient will complete tongue base and laryngeal elevation exercises 15/15;  Dyspahgia treatment  -tongue base retraction ex: x 10 for 2 reps set up and mild cues for target  -falsetto \"ee\": maintained 10 second duration  -pitch variation low to high: set up; conditioning for target x 10 reps   -BOT with pitch variation low to high with falsetto hold: set up ; conditioning for target x 10 reps  -avery: >75% success    Continue goal n/a   Goal 5. The patient will tolerate skilled therapy trials of thin liquids and/r dental soft foods with compensatory strategies 15/15 without clinical overt s/s of penetration/aspiratio -Ice chips: training in swallow strategy (volitional throat clear and clearance swallow).  6/6 appeared tolerated without overt clinical s/s post swallow technique and without voice quality changes or pt complaints post swallow  -tsp H20: with lingual hold; effortful swallow and modified supraglottic swallow (swallow, volitional Call light within reach  [] Chair alarm activated  [] Bed alarm activated  [] Other:    Progress Assessment: 7/15/2019: Requested MD order for ice chips between meals. Additional assessment using LINDA (norms for >72years of age): Pt achieved score profile of '1' high probability of IND for Counting Money; solving Daily Math Story Problems; Writing a Check and Balancing a Checkbook; Understanding Med Labels; Reading Instructions. Impulsivity noted as well repetition warranted for the above subtests. 7/16/2019: Additional assessment:will formulate goals to address  executive function; higher level attention tasks. Will continue dysphagia goals but upgrade quantity of reps of ex. Plan: Continue as per plan of care. Additional information:     Interventions used during Rehab Stay:  [] Speech/Language Treatment  [] Instruction in HEP [] Group [x] Dysphagia Treatment [x] Cognitive Treatment   [] Other:    Adverse Reactions to Treatment/Significant Change in Status:       Electronically Signed by        Josh Dodson. [employer],MS,CCC,SLP [id number]  Speech and Language Pathologist

## 2019-07-16 NOTE — PROGRESS NOTES
Occupational Therapy  Facility/Department: [address] IP REHAB  Daily Treatment Note  NAME: Yari Sherman  : 1930  MRN: [id number]    Date of Service: 2019    Discharge Recommendations:  S Level 1, Home with assist PRN, Home with Home health OT       Assessment   Performance deficits / Impairments: Decreased functional mobility ; Decreased ADL status; Decreased endurance;Decreased balance;Decreased high-level IADLs  Assessment: Pt tolerated OT tx well for ADL. Pt with supervision for bathing, SBA LB dressing, mod a UB dressing, and dependent assist with [employer] [place] J brace and wife will need training. Pt supervision fxl transfers/mobility via RW. Continue OT tx per POC. Treatment Diagnosis: impaired ADLs, IADLs, fxl transfers/mobility, endurance  Prognosis: Good  Patient Education: ADL retraining, fxl transfers/mobility  REQUIRES OT FOLLOW UP: Yes  Activity Tolerance  Activity Tolerance: Patient Tolerated treatment well  Safety Devices  Safety Devices in place: Yes  Type of devices: Chair alarm in place;Call light within reach;Gait belt;Left in chair(LV Dominguez) aware)         Patient Diagnosis(es): There were no encounter diagnoses. has a past medical history of Ankylosing spondylitis ([facility] Basal cell carcinoma, Diabetes mellitus type II, controlled ([facility] [facility] Hyperlipidemia, Hypertension, and Varicella. has a past surgical history that includes TURP and cervical fusion. Restrictions  Restrictions/Precautions  Restrictions/Precautions: Fall Risk, Modified Diet, Swallowing - Thickened Liquids  Required Braces or Orthoses?: Yes  Required Braces or Orthoses  Cervical: miami J(\"[place] J collar when HOB > 30 degreess and when Out of bed, ambulatory. \" \"If given a collar, expect to wear the collar for 6 weeks\")  Position Activity Restriction  Spinal Precautions: No Lifting, No Bending, No Twisting(\"no heavy lifting over 10 pounds\" \"no lifting of objects over your head\"  \"No bending at the waistELY [facility] with your knees.\")  Spinal Precautions: \"Walking is encoruaged. No other form of exercises until follow up appointment. \"  Other position/activity restrictions: diet: dysphagia II mechanically altered, nectar thick. \"[place] J collar when OOB or if HOB elevated >30 degrees. \" Per RN note 7/11/19: \"RN called  Dr Paul Villareal office to clarify orders for [place] J collar. RN notified Dr Lui Mckenna how orders were written in discharge orders were correct and patient notified. [place] J collar is place if patient HOB >30 or up and ambulating or in chair. Patient may remove collar if HOB is less than 30 degrees. \"  Subjective   General  Chart Reviewed: Yes, Progress Notes, Orders  Patient assessed for rehabilitation services?: Yes  Additional Pertinent Hx: Per Dr. Reyes Sender H&P 7/11/19: \"Mr. Cely Benítez is an 79 yo M with pmh HTN, HLD, and DM2 who initially presented to  on 7/4/2019 with upper extremity weakness/paresthesias and urinary retention following a ground level fall. Patient was working in garden when he lost his balance and fall, striking his head on a step, resulting in hyperextension at the neck. He denied loss of consciousness. Imaging revealed type II dens fracture with 7mm posterior displacement and cord impingement, C1 left posterior arch and laminar fractures, and suspected dissection/thrombus in right vertebral artery V3 segment. He underwent occiput-C4 fusion (7/6 with Dr. Paul Villareal). Post-op course notable for pulmonary edema requiring diuresis, new onset dysphagia, and significant troponin elevation/possible NSTEMI (managed medically). Now presents to ARU with impaired mobility and self-care below his baseline. Currently, he reports improvement in upper extremity weakness/paresthesias. He feels his LUE has returned to baseline but he has persistent abnormal sensation in the right hand, weakness throughout the right arm, and difficulty with fine motor coordination.  He reports poor balance but no focal leg weakness or during this. Pt will need assist with brace at discharge-- discussed with pt to see if wife can come in for training. Balance  Sitting Balance: Modified independent   Standing Balance: (SBA/supervision)    Functional Mobility  Functional - Mobility Device: Rolling Walker  Activity: To/from bathroom  Assist Level: Supervision  Functional Mobility Comments: short distances in room to participate in ADLs. Pt steady, no LOB. Shower Transfers  Shower - Transfer From: Vinnetta Fillers - Transfer Type: To and From  Shower - Transfer To: Shower seat with back  Shower - Technique: Ambulating  Shower Transfers: Supervision  Shower Transfers Comments: via grab bars prn, cues for sit and swing technique. Wet towel under feet on floor for nonskid surface. Wheelchair Bed Transfers  Wheelchair/Bed - Technique: Ambulating  Equipment Used: Bed;Other; Wheelchair  Level of Asssistance: Supervision  Wheelchair Transfers Comments: via RW: sit>stand from w/c. Sit<>stand from bed. Stand>sit recliner. Bed mobility  Rolling to Right: Contact guard assistance;Stand by assistance(SBA/CGA rolling to ensure pt maintained spinal precautions when changing pads on neck brace. RN present to assist as well. VCs for log roll.)  Supine to Sit: Modified independent  Sit to Supine: Modified independent  Scooting: Modified independent  Comment: flat bed, no rails. Pt with better maintenance of log roll technique today with sit<>supine with Rizwana vásquez. Rizwana SUAREZ wet pads changed to dry pads once supine and rolling in bed. Cognition  Overall Cognitive Status: WFL  Cognition Comment: grossly WFL, though some mild memory impairments noted       PM SESSION    Pt met in dept, pleasant and agreeable to OT tx. Pt denies pain. Pt will talk to his wife to see if she can come in for training on [address], but otherwise OT will provide pt with manual/instructions. Functional Transfers (no AD):   Wheelchair:

## 2019-07-16 NOTE — PROGRESS NOTES
descent )  Device: [address]: Modified Independent, Supervision  Distance: 350' with multiple turns, short distancs in therapy gym, 300' with two turns (increased complaints of fatigue especially with BUE, supervision cues for safe management throughout)  OT  PT Equipment Recommendations  Equipment Needed: No  Other: continue to assess pending progression   Toilet - Technique: Ambulating  Equipment Used: Raised toilet seat without rails  Toilet Transfers Comments: Via RW: BSC set to comfort heigh. At home, pt uses windowsill on right (at sink/vanity level), and toilet edge on left. Pt completed transfer 2x this date. CGA/SBA to sit. 1st trial CGA to stand d/c posterior lean, but CGA/SBA to stand 2nd trial w/o posterior lean. Pt reports toilet seat is wider at home and easier to reach. OT did show pt the toilet safety frame and described, yet pt does not feel he wants/needs at this time. Assessment        SLP  Diet Solids Recommendation: Dysphagia II Mechanically Altered  Liquid Consistency Recommendation: Nectar    Body mass index is 24.32 kg/m². Assessment and Plan:  Impairments: RUE weakness and sensory deficit, balance, endurance, dysphagia     Central cord syndrome - Due to hyperextension injury with type II dens fracture, C1 left posterior arch and laminar fractures. S/p occiput-C4 fusion (7/6 with Dr. Otilio Peña). [place] J when OOB or HOB >30 degrees. PT/OT.      Possible right vertebral artery thrombus/dissection - . Repeat CTA as outpatient.      Dysphagia - Dietitian and SLP consults. Currently on DII + nectars, ice chips between meals.      Pulmonary edema/fluid overload - s/p diuresis. Monitor daily wt and respiratory status.      NSTEMI - Significant troponin elevation without clinical symptoms. ASA, statin, bb.  Follow-up with Cardiology.      HTN - Amlodipine, HCTZ, metoprolol, valsartan dc'ed.     DM2 - Metformin, ISS while inpatient.      Bladder - high risk

## 2019-07-16 NOTE — PATIENT CARE CONFERENCE
[facility]  Inpatient Rehabilitation  Weekly Team Conference Note      Date: 2019  Patient Name:  Boyd Romero    MRN: [id number]  : 1930  Gender: male  Physician: Dr Mason Humphreys  Diagnosis: 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, init ([facility] [R81.889Y]    CASE MANAGEMENT  Assessment:    Goal is home. PHYSICAL THERAPY    Bed mobility  Rolling to Left: Modified independent  Rolling to Right: Modified independent  Supine to Sit: Modified independent  Sit to Supine: Modified independent  Scooting: Modified independent  Comment: HOB flat, no railing; perform log roll well without assistance, increased time to perform     Transfers:  Sit to Stand: Supervision(flexed posture with VC for safety to achieve standing, requires increased time )  Stand to sit: Supervision(VC for safety with eccentric descent )    Ambulation 1  Surface: level tile  Device: Rolling Walker  Assistance: Modified Independent, Supervision  Quality of Gait: pushes RW slightly too far forward over threshold with VC for safety with uneven surface, steady with increased karoline for ambulation using RW for slight steady assistance. Gait Deviations: Shuffles, Deviated path, Decreased head and trunk rotation, Slow Karoline  Distance: 350' with multiple turns, short distancs in therapy gym, 300' with two turns (increased complaints of fatigue especially with BUE, supervision cues for safe management throughout)  Comments: at this time better efficiency and tolerance with RW compared to without device     Ambulation  Ambulation?: Yes  Ambulation 2  Surface - 2: level tile  Device 2: No device  Assistance 2: Stand by assistance  Quality of Gait 2: narrow DAIJA, increased guarding compared to RW but able to cues promoting increased step height/length this date with cues for increased arm swing, decreased karoline, without LOB   Gait Deviations: Slow Karoline; Shuffles; Deviated path;Decreased head and trunk rotation;Decreased arm swing;Decreased step length  Distance: 0' with two turns X 2 trials, multiple short distances in therapy gym (w/c<-> NuStep, w/c to recliner in room)     Stairs  # Steps : 12  Stairs Height: 6\"  Rails: Bilateral  Curbs: 2\"(X 1 trial without device SBA, slight unsteady without device but able to complete with increased time and VC for proper sequencing; slight anxiety not using device for first time)  Device: No Device  Assistance: Modified independent   Comment: reciprocal pattern for ascending/descend steps with bilateral railing, steady throughout without LOB and without concern. Car Transfer: Supervision(supervision for all transfer components (sit <-> stand w/c, w/c <-> car ~5', sit <-> stand car seat, swinging legs in/out mod I). Sit <-> w/c throughout session mod I. )      FIMS:  Bed, Chair, Wheel Chair: 5 - Requires setup/supervision/cues  Walk: 5 - Supervision Requires standby supervision or cuing to walk at least 150 feet  Distance Walked: 230'   Stairs:  - [address] goes up and down at least one flight of stairs but requries a side support, handrail, cane, or portable supports, or the activity takes more than a reasonable amount of times, or there are safety considerations    PT Equipment Recommendations  Equipment Needed: No  Other: continue to assess pending progression     Assessment: Pt demonstrated improvement with ambulation this date without device community distances with SBA improving overall increased tolerance. Goal to promote increased ambulation tolerance and safety without device prior to discharge home. Periodic cues to slow down for safety, Pt increased ability to complete all mobility tasks in timely manor. Pt benefit from IP rehab to promote increase strength BLEs, overall increased activity tolerance, safety with transfers, and improve higher level balance to promote independent with mobility tasks.  Discharge home ~ Friday or Saturday secondary progressing well towards goals. SPEECH THERAPY (intentionally left blank if not actively being seen by this service):  FIMS:  Comprehension: 6 - Complex ideas 90% or device (hearing aid or glasses- if patient is primarily a visual learner)  Expression: 6 - Device used to express complex ideas/needs  Social Interaction: 7 - Patient has appropriate behavior/relations 100% of the time  Problem Solvin - Patient able to solve simple/routine tasks  Memory: 5 - Patient requires prompting with stress/unfamiliar situations    Assessment: Pt with residual Oropharyngeal Dysphagia characterized by reduced lingual coordination; delayed initiation of swallow and concern for reduced laryngeal elevation and residual sensory deficits and potential reduced pharyngeal clearance. Pt appears to be tolerating current diet of Dysphagia II with nectar thick liquids. Pt appears to be tolerating lingual/laryngeal/pharyngeal ex emphasizing bolus control; laryngeal elevation and airway protection. Pt appears to be improving with new learning compensatory strategies for swallowing; but most effective with cues session to session and intermittent cues within the session after set up and conditioning. Pt demonstrating higher level language skills;but cognitive-linguistic deficits in domains of recall of new information; working memory; higher level attention (alternating/divided attention) which can impact functional IND with tasks requiring executive function. Will continue skilled Oropharyngeal Dysphagia treatment and cognitive-linguistic therapy while on acute rehab. Anticipate need for continued therapy at d/c. Consideration for repeat MBSS prior to d/c  Alesha Vegas,MS,CCC,SLP [id number]  Speech and Language Pathologist          OCCUPATIONAL THERAPY      ADL  Equipment Provided: Reacher, Sock aid, Long-handled shoe horn, Long-handled sponge  Feeding: (not seen at meal time, does not wear dentures)  Grooming: Supervision(seated for the Upcoming Week:  1. Improve standing balance and household distance ambulation with wheeled walker to allow patient to maximize independence with self-care activities and allow azaliat to walk to bathroom unassisted per his goal   2. ADLs with mod I except for assist with miami J, across disciplines. 3. Tolerate diet upgrade to dysphagia III with nectar thick liquids and Bubba Fass Protocol without medical complications  4. Demonstrate improved recall of new learning strategies/techniques and improved higher level cognitive-linguistic skills for d/c planning and participation in adv DL tasks with set up and mild assist         Team Members Present at Conference:  Physician: Dr Jaden Carpio  : [place], [place]    Occupational Therapist: Mallory Gregg, OTR/L #[id number]  Physical Therapist: Melony Javed [facility]  Speech Therapist: Linda Schwab. [place],MS,CCC,SLP [id number]  Nurse: Venessa Brady RN, CRRN  Dietician:Anyi Bess, RD, LD   Irasema Mendenhall, PT, MPT       I led this team conference and I approve the established interdisciplinary plan of care as documented within the medical record of [facility]. MD: Anni Duarte.  Jaden Carpio MD 7/17/2019, 10:17 AM

## 2019-07-17 LAB
GLUCOSE BLD-MCNC: 114 MG/DL (ref 70–99)
GLUCOSE BLD-MCNC: 125 MG/DL (ref 70–99)
GLUCOSE BLD-MCNC: 132 MG/DL (ref 70–99)
GLUCOSE BLD-MCNC: 194 MG/DL (ref 70–99)
PERFORMED ON: ABNORMAL

## 2019-07-17 PROCEDURE — 97116 GAIT TRAINING THERAPY: CPT

## 2019-07-17 PROCEDURE — 97530 THERAPEUTIC ACTIVITIES: CPT

## 2019-07-17 PROCEDURE — 97535 SELF CARE MNGMENT TRAINING: CPT

## 2019-07-17 PROCEDURE — 97127 HC SP THER IVNTJ W/FOCUS COG FUNCJ: CPT

## 2019-07-17 PROCEDURE — 92526 ORAL FUNCTION THERAPY: CPT

## 2019-07-17 PROCEDURE — 1280000000 HC REHAB R&B

## 2019-07-17 PROCEDURE — 94760 N-INVAS EAR/PLS OXIMETRY 1: CPT

## 2019-07-17 PROCEDURE — 51798 US URINE CAPACITY MEASURE: CPT

## 2019-07-17 PROCEDURE — 97110 THERAPEUTIC EXERCISES: CPT

## 2019-07-17 PROCEDURE — 6360000002 HC RX W HCPCS: Performed by: PHYSICAL MEDICINE & REHABILITATION

## 2019-07-17 PROCEDURE — 6370000000 HC RX 637 (ALT 250 FOR IP): Performed by: PHYSICAL MEDICINE & REHABILITATION

## 2019-07-17 RX ORDER — POLYETHYLENE GLYCOL 3350 17 G/17G
17 POWDER, FOR SOLUTION ORAL DAILY
Status: DISCONTINUED | OUTPATIENT
Start: 2019-07-17 | End: 2019-07-20 | Stop reason: HOSPADM

## 2019-07-17 RX ADMIN — HYDROCHLOROTHIAZIDE 25 MG: 25 TABLET ORAL at 07:26

## 2019-07-17 RX ADMIN — HEPARIN SODIUM 5000 UNITS: 5000 INJECTION INTRAVENOUS; SUBCUTANEOUS at 13:13

## 2019-07-17 RX ADMIN — CITALOPRAM HYDROBROMIDE 20 MG: 20 TABLET ORAL at 07:27

## 2019-07-17 RX ADMIN — METFORMIN HYDROCHLORIDE 1000 MG: 500 TABLET, EXTENDED RELEASE ORAL at 07:27

## 2019-07-17 RX ADMIN — DOCUSATE SODIUM 100 MG: 100 CAPSULE, LIQUID FILLED ORAL at 20:50

## 2019-07-17 RX ADMIN — Medication 3 MG: at 22:42

## 2019-07-17 RX ADMIN — HEPARIN SODIUM 5000 UNITS: 5000 INJECTION INTRAVENOUS; SUBCUTANEOUS at 20:51

## 2019-07-17 RX ADMIN — ACETAMINOPHEN 650 MG: 325 TABLET ORAL at 07:28

## 2019-07-17 RX ADMIN — SIMVASTATIN 80 MG: 40 TABLET, FILM COATED ORAL at 20:51

## 2019-07-17 RX ADMIN — POLYETHYLENE GLYCOL 3350 17 G: 17 POWDER, FOR SOLUTION ORAL at 17:00

## 2019-07-17 RX ADMIN — ACETAMINOPHEN 650 MG: 325 TABLET ORAL at 22:42

## 2019-07-17 RX ADMIN — INSULIN LISPRO 1 UNITS: 100 INJECTION, SOLUTION INTRAVENOUS; SUBCUTANEOUS at 20:51

## 2019-07-17 RX ADMIN — HEPARIN SODIUM 5000 UNITS: 5000 INJECTION INTRAVENOUS; SUBCUTANEOUS at 06:17

## 2019-07-17 RX ADMIN — AMLODIPINE BESYLATE 10 MG: 10 TABLET ORAL at 20:49

## 2019-07-17 RX ADMIN — DOCUSATE SODIUM 100 MG: 100 CAPSULE, LIQUID FILLED ORAL at 07:27

## 2019-07-17 RX ADMIN — ASPIRIN 325 MG: 325 TABLET, DELAYED RELEASE ORAL at 07:26

## 2019-07-17 RX ADMIN — METHOCARBAMOL TABLETS 750 MG: 750 TABLET, COATED ORAL at 19:38

## 2019-07-17 RX ADMIN — METOPROLOL SUCCINATE 25 MG: 25 TABLET, FILM COATED, EXTENDED RELEASE ORAL at 09:38

## 2019-07-17 ASSESSMENT — PAIN DESCRIPTION - ONSET
ONSET: ON-GOING

## 2019-07-17 ASSESSMENT — PAIN DESCRIPTION - PAIN TYPE
TYPE: ACUTE PAIN

## 2019-07-17 ASSESSMENT — PAIN DESCRIPTION - ORIENTATION
ORIENTATION: RIGHT;LEFT

## 2019-07-17 ASSESSMENT — PAIN DESCRIPTION - LOCATION
LOCATION: NECK

## 2019-07-17 ASSESSMENT — PAIN DESCRIPTION - PROGRESSION
CLINICAL_PROGRESSION: NOT CHANGED
CLINICAL_PROGRESSION: GRADUALLY IMPROVING

## 2019-07-17 ASSESSMENT — PAIN SCALES - GENERAL
PAINLEVEL_OUTOF10: 0
PAINLEVEL_OUTOF10: 4
PAINLEVEL_OUTOF10: 3
PAINLEVEL_OUTOF10: 0
PAINLEVEL_OUTOF10: 1
PAINLEVEL_OUTOF10: 0
PAINLEVEL_OUTOF10: 1
PAINLEVEL_OUTOF10: 0

## 2019-07-17 ASSESSMENT — PAIN DESCRIPTION - FREQUENCY
FREQUENCY: CONTINUOUS

## 2019-07-17 ASSESSMENT — PAIN - FUNCTIONAL ASSESSMENT
PAIN_FUNCTIONAL_ASSESSMENT: ACTIVITIES ARE NOT PREVENTED

## 2019-07-17 ASSESSMENT — PAIN DESCRIPTION - DESCRIPTORS
DESCRIPTORS: PRESSURE;DISCOMFORT
DESCRIPTORS: ACHING
DESCRIPTORS: PRESSURE;DISCOMFORT;DULL
DESCRIPTORS: PRESSURE;DISCOMFORT

## 2019-07-17 NOTE — PROGRESS NOTES
Occupational Therapy  Facility/Department: [address] REHAB  Daily Treatment Note  NAME: Kamille Pinedo  : 1930  MRN: [id number]    Date of Service: 2019    Discharge Recommendations:  S Level 1, Home with assist PRN, Home with Home health OT  OT Equipment Recommendations  Other: TBD Pending progress    Assessment   Performance deficits / Impairments: Decreased functional mobility ; Decreased ADL status; Decreased endurance;Decreased balance;Decreased high-level IADLs  Assessment: Pt is doing well with transfers and functional mobiltiy without device, SBA. He was able to tolerate 10 minutes of mobility on therapy, picked tomatoes and washed. Pt stated he is on a \"mushy diet\"  agreed to give tomatoes to wife. Pt feels he is ready for discharge by the end of the week with assist of wife as needed. Treatment Diagnosis: impaired ADLs, IADLs, fxl transfers/mobility, endurance  Prognosis: Good  Patient Education: IADL retraining, standing balance, FM coordination, POC and d/c plans  REQUIRES OT FOLLOW UP: Yes  Activity Tolerance  Activity Tolerance: Patient Tolerated treatment well  Safety Devices  Safety Devices in place: Yes  Type of devices: Gait belt;Call light within reach; Chair alarm in place; Left in chair;Patient at risk for falls              has a past medical history of Ankylosing spondylitis ([address] Basal cell carcinoma, Diabetes mellitus type II, controlled ([address] Hyperlipidemia, Hypertension, and Varicella. has a past surgical history that includes TURP and cervical fusion. Restrictions  Restrictions/Precautions  Restrictions/Precautions: Fall Risk, Modified Diet, Swallowing - Thickened Liquids  Required Braces or Orthoses?: Yes  Required Braces or Orthoses  Cervical: miami J(\"[place] J collar when HOB > 30 degreess and when Out of bed, ambulatory. \" \"If given a collar, expect to wear the collar for 6 weeks\")  Position Activity Restriction  Spinal Precautions: No Lifting, No Bending, No Twisting(\"no heavy lifting over 10 pounds\" \"no lifting of objects over your head\"  \"No bending at the waistELY [facility] with your knees. \")  Spinal Precautions: \"Walking is encoruaged. No other form of exercises until follow up appointment. \"  Other position/activity restrictions: diet: dysphagia II mechanically altered, nectar thick. \"[place] J collar when OOB or if HOB elevated >30 degrees. \" Per RN note 7/11/19: \"RN called  Dr Tricia Collazo office to clarify [facility]. RN notified Dr Bozena Jose how orders were written in discharge orders were correct and patient notified. [place] J collar is place if patient HOB >30 or up and ambulating or in chair. Patient may remove collar if HOB is less than 30 degrees. \"  Subjective   General  Chart Reviewed: Yes, Progress Notes, Orders  Patient assessed for rehabilitation services?: Yes  Additional Pertinent Hx: Per Dr. Juan Monahan H&P 7/11/19: \"Mr. Nany Harvey is an 81 yo M with pmh HTN, HLD, and DM2 who initially presented to  on 7/4/2019 with upper extremity weakness/paresthesias and urinary retention following a ground level fall. Patient was working in garden when he lost his balance and fall, striking his head on a step, resulting in hyperextension at the neck. He denied loss of consciousness. Imaging revealed type II dens fracture with 7mm posterior displacement and cord impingement, C1 left posterior arch and laminar fractures, and suspected dissection/thrombus in right vertebral artery V3 segment. He underwent occiput-C4 fusion (7/6 with Dr. Tricia Collazo). Post-op course notable for pulmonary edema requiring diuresis, new onset dysphagia, and significant troponin elevation/possible NSTEMI (managed medically). Now presents to ARU with impaired mobility and self-care below his baseline. Currently, he reports improvement in upper extremity weakness/paresthesias.  He feels his LUE has returned to baseline but he has persistent abnormal sensation in the right hand, weakness throughout the right arm, and difficulty with fine motor coordination. He reports poor balance but no focal leg weakness or numbness. He has moderate neck pain with radiation to the right shoulder. Described mostly as \"tightness. \" He is urinating volitionally but reports frequency. He has not had BM in at least 2 days. He is motivated to start inpatient rehab program. \" PMH includes: ankylosing spondylitis, basal cell carcinoma, DM II, hyperlipidemia, , hypertension, varicella, TURP. Response to previous treatment: Patient with no complaints from previous session  Family / Caregiver Present: No  Referring Practitioner: Dr. Lui Mckenna  Diagnosis: ground level fall with hyperextension at neck and fracture. Occiput-C4 fusion 7/6  Subjective  Subjective: Pt met in dept, agreeable to OT. General Comment  Comments: Pt goes by [employer]. \"           Objective    ADL  Grooming: Supervision(washed hands at sink)  Toileting: Stand by assistance(managed pants up/down after urinating. Cues to pull pants over knees prior to stance) - nurse aware of urine in hat in commode for measuring        Balance  Sitting Balance: Modified independent (Rizwana SUAREZ)  Standing Balance: Stand by assistance  Standing Balance  Time: 10 minutes  Activity: picking vegetables on therapy terrace  Comment: no AD  Functional Mobility  Functional - Mobility Device: No device  Activity: Retrieve items;Transport items  Assist Level: Stand by assistance  Functional Mobility Comments: Pt amb on therapy terrace over various surfaces . Able to stand at elevated garden , reaching using good body mechanics to pick tomatoes, amb around objects with good balance.   Tolerated standing 10 minutes  Toilet Transfers  Toilet - Technique: Ambulating  Equipment Used: Grab bars(comfort height commode)  Toilet Transfer: Stand by assistance  Wheelchair Bed Transfers  Wheelchair/Bed - Technique: Ambulating  Equipment Used: Wheelchair  Level of Asssistance: Supervision;Stand by

## 2019-07-17 NOTE — PROGRESS NOTES
some mild memory impairments noted                       Exercises  Grasp/Release: 3 lb digiflexor BUE x20 reps to maximize hand  strength for ADLs and fxl transfers                    Plan   Plan  Times per week: 5-6x  Times per day: Twice a day  Plan weeks: 7-10 days (from 7/12/19)  Current Treatment Recommendations: Functional Mobility Training, Balance Training, Patient/Caregiver Education & Training, Self-Care / ADL, Equipment Evaluation, Education, & procurement, Home Management Training, Safety Education & Training, Endurance Training  Plan Comment: Pt plans to d/c home Saturday 7/20/19. Recommend home OT, home health aide. Goals  Short term goals  Time Frame for Short term goals: 5 days (from 7/12/19)  Short term goal 1: Pt will complete fxl transfers/mobility to participate in ADLs SBA. Short term goal 2: Pt will complete bathing SBA/supervision, AE prn. Short term goal 3: Pt will complete dressing SBA/supervision, AE prn. Short term goal 4: Pt will complete toileting SBA/supervision. Short term goal 5: Pt will tolerate standing x7-10 mins to progress activity tolerance for ADLs. Long term goals  Time Frame for Long term goals : 7-10 days (from 7/12/19)  Long term goal 1: Pt will complete fxl transfers/mobility to participate in ADLs mod I.  Long term goal 2: Pt will complete bathing mod I, AE prn. Long term goal 3: Pt will complete dressing mod I, AE prn except assist for brace (so overall SBA UB). Long term goal 4: Pt will complete toileting mod I.  Long term goal 5: Pt will complete simple meal prep and clean up mod I. Patient Goals   Patient goals : \"I want to be able to walk to the bathroom unassisted. \"       Therapy Time   Individual Concurrent Group Co-treatment   Time In 4191         Time Out 1200         Minutes 45         Timed Code Treatment Minutes: [address] [facility], OTR/L #[id number]

## 2019-07-17 NOTE — PROGRESS NOTES
has \"a walking diability. She uses crutches or a walker. \" Pt typically helps his wife with retrieving items around the house.)  Type of Home: House  Home Layout: Two level, Performs ADL's on one level, Able to Live on Main level with bedroom/bathroom(pt has a workshop in the basement (railing on right if descending))  Home Access: Stairs to enter with rails  Entrance Stairs - Number of Steps: \"a lot, half a flight to the deck, then a few steps rising up. There's an abundance of railings on both side, all over the place. \"  Entrance Stairs - Rails: Both  Bathroom Shower/Tub: Walk-in shower, Curtain, Shower chair with back(small lip to step over. Grab bar outside the shower)  Bathroom Toilet: Handicap height(no bars or vanity next to toilet. Windowsill on the right and toilet seat on left. \"Senior level height\")  Bathroom Equipment: Built-in shower seat, Shower chair(wife has a shower chair)  Bathroom Accessibility: Walker accessible, Wheelchair accessible, Accessible(2 bathrooms connected)  Home Equipment: Rolling walker, Crutches, Reacher(pt's wife has 2 RWs so pt says he could use 1. Wife has 3 sets of forearm crutches. Wife has a reacher. Pt plans to get life alert button. Sleeps in regular bed. Sits in a swivel lounging chair that is also a lift chair.)  ADL Assistance: Independent  Homemaking Assistance: Independent(does over 50% of cooking, cooks breakfast. Wife does laundry, shares heavy cleaning & wife does light cleaning. Pt manages meds & finances. Pt grocery shops. No pets.)  Homemaking Responsibilities: Yes(split with wife)  Ambulation Assistance: Independent  Transfer Assistance: Independent  Active : Yes  Mode of Transportation: Car  Occupation: Retired, Volunteer work  Type of occupation: . 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 Leisure & Hobbies: has not had much time for hobbies d/t very involved with volunteer work.  Pt likes to mow the grass (3 acres). Sings in a choir. IADL Comments: Pt involved in money making, cooking, cleaning and splits these responsibilities with his wife. Additional Comments: Pt reports his wife leans on him when walking, he gets items for pt. He does help wife into car prn, but wife is able to drive. Pt denies any other recent falls, did fall about a year ago (fell backwards against a tree). Pt says he fell into a honeysuckle branch ~3 years ago. Restrictions  Restrictions/Precautions  Restrictions/Precautions: Fall Risk, Modified Diet, Swallowing - Thickened Liquids  Required Braces or Orthoses?: Yes  Required Braces or Orthoses  Cervical: miami J(\"[place] J collar when HOB > 30 degreess and when Out of bed, ambulatory. \" \"If given a collar, expect to wear the collar for 6 weeks\")  Position Activity Restriction  Spinal Precautions: No Lifting, No Bending, No Twisting(\"no heavy lifting over 10 pounds\" \"no lifting of objects over your head\"  \"No bending at the waistELY [facility] with your knees. \")  Spinal Precautions: \"Walking is encoruaged. No other form of exercises until follow up appointment. \"  Other position/activity restrictions: diet: dysphagia II mechanically altered, nectar thick. \"[place] J collar when OOB or if HOB elevated >30 degrees. \" Per RN note 7/11/19: \"RN called  Dr Jones Clary office to clarify orders for [facility] J collar. RN notified Dr Ford Leija how orders were written in discharge orders were correct and patient notified. [place] J collar is place if patient HOB >30 or up and ambulating or in chair. Patient may remove collar if HOB is less than 30 degrees. \"  Subjective   General  Chart Reviewed: Yes  Additional Pertinent Hx: \"79 yo M with pmh HTN, HLD, and DM2 who initially presented to  on 7/4/2019 with upper extremity weakness/paresthesias and urinary retention following a ground level fall.  Patient was working in garden when he lost his balance and fall, striking his head on a step, resulting in week: 5-6X   Times per day: Twice a day  Plan weeks: 7-10 days   Current Treatment Recommendations: Strengthening, Balance Training, Functional Mobility Training, Transfer Training, Endurance Training, Gait Training, Stair training, Neuromuscular Re-education, Safety Education & Training, Home Exercise Program, Equipment Evaluation, Education, & procurement, Patient/Caregiver Education & Training  Safety Devices  Type of devices:  All fall risk precautions in place, Gait belt, Patient at risk for falls, Left in chair(return to room with transportation )  Restraints  Initially in place: No     Therapy Time   Individual Concurrent Group Co-treatment   Time In 0820         Time Out 0915         Minutes 55         Timed Code Treatment Minutes: 54 [address]   Time In 1430     Time Out 1500     Minutes 7 Ana Huang PT     Electronically signed by Sylvie Heredia PT on 7/17/19 at 9:16 AM

## 2019-07-17 NOTE — PROGRESS NOTES
metoprolol, valsartan dc'ed.     DM2 - Metformin, ISS while inpatient.      Bladder - high risk retention/neurogenic bladder - Monitor PVRs, SC prn >300cc. New onset hematuria and retention now resolving with resolution of constipation, f/u urine culture.       Bowel - Constipation - colace BID, miralax daily, PRN MoM and bisacodyl supp.     Pain control - prn Percocet and methocarbamol    DVT PPx - heparin      Rehab Progress: Interdisciplinary team conference was held today with entire rehab treatment team including PT, OT, SLP, Dietician, RN, and SW. Discussion focused on progress toward rehab goals and discharge planning. Making good progress. Working on functional mobility, balance, RUE function and compensatory strategies. Working toward ambulation without assistive device. SLP noting improvements in swallow at bedside but would like to confirm with MBS. He does have some difficulty with recall/new learning for swallow exercises. Wife invited for training on assistance with cervical collar management. Separate conference then held with patient/family, questions answered and concerns addressed. Total treatment time >35 min with greater than 50% spent in care coordination. Anticipated Dispo: home with wife  Services:  PT, OT, RN, Aide  DME: toilet safety frame, possible rolling walker  ELOS: 7/20      [address]  Whit De Guzman MD 7/17/2019, 8:48 AM

## 2019-07-17 NOTE — PROGRESS NOTES
Patient oriented to person, place, time. Vital signs stable. Patient remains on room air only. Patients currently experiencing a level 1 pain to neck, where brace rubs skin. . Shift assessment completed. Medication administration completed without any complications. Patient is up with activity times 1 CGA. Requiring Minimal contact assistance. Patients tolerating Carb control Diet. Remains continent of bowel and bladder. No needs voiced at this time. Call light within reach. Will continue to monitor.

## 2019-07-18 LAB
ANION GAP SERPL CALCULATED.3IONS-SCNC: 13 MMOL/L (ref 3–16)
BASOPHILS ABSOLUTE: 0.1 K/UL (ref 0–0.2)
BASOPHILS RELATIVE PERCENT: 0.9 %
BUN BLDV-MCNC: 30 MG/DL (ref 7–20)
CALCIUM SERPL-MCNC: 9.4 MG/DL (ref 8.3–10.6)
CHLORIDE BLD-SCNC: 99 MMOL/L (ref 99–110)
CO2: 28 MMOL/L (ref 21–32)
CREAT SERPL-MCNC: 1 MG/DL (ref 0.8–1.3)
EOSINOPHILS ABSOLUTE: 0.8 K/UL (ref 0–0.6)
EOSINOPHILS RELATIVE PERCENT: 5.6 %
GFR AFRICAN AMERICAN: >60
GFR NON-AFRICAN AMERICAN: >60
GLUCOSE BLD-MCNC: 119 MG/DL (ref 70–99)
GLUCOSE BLD-MCNC: 122 MG/DL (ref 70–99)
GLUCOSE BLD-MCNC: 123 MG/DL (ref 70–99)
GLUCOSE BLD-MCNC: 129 MG/DL (ref 70–99)
GLUCOSE BLD-MCNC: 185 MG/DL (ref 70–99)
HCT VFR BLD CALC: 39 % (ref 40.5–52.5)
HEMOGLOBIN: 12.8 G/DL (ref 13.5–17.5)
LYMPHOCYTES ABSOLUTE: 1.8 K/UL (ref 1–5.1)
LYMPHOCYTES RELATIVE PERCENT: 13.2 %
MCH RBC QN AUTO: 30.8 PG (ref 26–34)
MCHC RBC AUTO-ENTMCNC: 32.9 G/DL (ref 31–36)
MCV RBC AUTO: 93.7 FL (ref 80–100)
MONOCYTES ABSOLUTE: 0.9 K/UL (ref 0–1.3)
MONOCYTES RELATIVE PERCENT: 6.4 %
NEUTROPHILS ABSOLUTE: 10.2 K/UL (ref 1.7–7.7)
NEUTROPHILS RELATIVE PERCENT: 73.9 %
ORGANISM: ABNORMAL
PDW BLD-RTO: 12.8 % (ref 12.4–15.4)
PERFORMED ON: ABNORMAL
PLATELET # BLD: 345 K/UL (ref 135–450)
PMV BLD AUTO: 8.1 FL (ref 5–10.5)
POTASSIUM REFLEX MAGNESIUM: 4 MMOL/L (ref 3.5–5.1)
RBC # BLD: 4.16 M/UL (ref 4.2–5.9)
SODIUM BLD-SCNC: 140 MMOL/L (ref 136–145)
URINE CULTURE, ROUTINE: ABNORMAL
URINE CULTURE, ROUTINE: ABNORMAL
WBC # BLD: 13.9 K/UL (ref 4–11)

## 2019-07-18 PROCEDURE — 51798 US URINE CAPACITY MEASURE: CPT

## 2019-07-18 PROCEDURE — 97110 THERAPEUTIC EXERCISES: CPT

## 2019-07-18 PROCEDURE — 97530 THERAPEUTIC ACTIVITIES: CPT

## 2019-07-18 PROCEDURE — 80048 BASIC METABOLIC PNL TOTAL CA: CPT

## 2019-07-18 PROCEDURE — 92526 ORAL FUNCTION THERAPY: CPT

## 2019-07-18 PROCEDURE — 6370000000 HC RX 637 (ALT 250 FOR IP): Performed by: PHYSICAL MEDICINE & REHABILITATION

## 2019-07-18 PROCEDURE — 6360000002 HC RX W HCPCS: Performed by: PHYSICAL MEDICINE & REHABILITATION

## 2019-07-18 PROCEDURE — 97127 HC SP THER IVNTJ W/FOCUS COG FUNCJ: CPT

## 2019-07-18 PROCEDURE — 1280000000 HC REHAB R&B

## 2019-07-18 PROCEDURE — 97535 SELF CARE MNGMENT TRAINING: CPT

## 2019-07-18 PROCEDURE — 85025 COMPLETE CBC W/AUTO DIFF WBC: CPT

## 2019-07-18 PROCEDURE — 97116 GAIT TRAINING THERAPY: CPT

## 2019-07-18 PROCEDURE — 36415 COLL VENOUS BLD VENIPUNCTURE: CPT

## 2019-07-18 RX ORDER — CIPROFLOXACIN 500 MG/1
500 TABLET, FILM COATED ORAL EVERY 12 HOURS SCHEDULED
Status: DISCONTINUED | OUTPATIENT
Start: 2019-07-18 | End: 2019-07-20 | Stop reason: HOSPADM

## 2019-07-18 RX ADMIN — METHOCARBAMOL TABLETS 750 MG: 750 TABLET, COATED ORAL at 22:13

## 2019-07-18 RX ADMIN — HEPARIN SODIUM 5000 UNITS: 5000 INJECTION INTRAVENOUS; SUBCUTANEOUS at 05:32

## 2019-07-18 RX ADMIN — ONDANSETRON HYDROCHLORIDE 4 MG: 4 TABLET, FILM COATED ORAL at 11:02

## 2019-07-18 RX ADMIN — SIMVASTATIN 80 MG: 40 TABLET, FILM COATED ORAL at 20:31

## 2019-07-18 RX ADMIN — METFORMIN HYDROCHLORIDE 1000 MG: 500 TABLET, EXTENDED RELEASE ORAL at 08:30

## 2019-07-18 RX ADMIN — CIPROFLOXACIN HYDROCHLORIDE 500 MG: 500 TABLET, FILM COATED ORAL at 17:05

## 2019-07-18 RX ADMIN — DOCUSATE SODIUM 100 MG: 100 CAPSULE, LIQUID FILLED ORAL at 08:30

## 2019-07-18 RX ADMIN — Medication 3 MG: at 22:13

## 2019-07-18 RX ADMIN — ACETAMINOPHEN 650 MG: 325 TABLET ORAL at 08:30

## 2019-07-18 RX ADMIN — ACETAMINOPHEN 650 MG: 325 TABLET ORAL at 22:13

## 2019-07-18 RX ADMIN — ASPIRIN 325 MG: 325 TABLET, DELAYED RELEASE ORAL at 08:30

## 2019-07-18 RX ADMIN — CITALOPRAM HYDROBROMIDE 20 MG: 20 TABLET ORAL at 08:30

## 2019-07-18 RX ADMIN — AMLODIPINE BESYLATE 10 MG: 10 TABLET ORAL at 20:30

## 2019-07-18 RX ADMIN — HEPARIN SODIUM 5000 UNITS: 5000 INJECTION INTRAVENOUS; SUBCUTANEOUS at 15:25

## 2019-07-18 RX ADMIN — ACETAMINOPHEN 650 MG: 325 TABLET ORAL at 15:25

## 2019-07-18 RX ADMIN — DOCUSATE SODIUM 100 MG: 100 CAPSULE, LIQUID FILLED ORAL at 20:31

## 2019-07-18 RX ADMIN — HYDROCHLOROTHIAZIDE 25 MG: 25 TABLET ORAL at 08:40

## 2019-07-18 RX ADMIN — HEPARIN SODIUM 5000 UNITS: 5000 INJECTION INTRAVENOUS; SUBCUTANEOUS at 21:07

## 2019-07-18 RX ADMIN — METHOCARBAMOL TABLETS 750 MG: 750 TABLET, COATED ORAL at 08:38

## 2019-07-18 RX ADMIN — POLYETHYLENE GLYCOL 3350 17 G: 17 POWDER, FOR SOLUTION ORAL at 08:30

## 2019-07-18 RX ADMIN — INSULIN LISPRO 1 UNITS: 100 INJECTION, SOLUTION INTRAVENOUS; SUBCUTANEOUS at 21:07

## 2019-07-18 ASSESSMENT — PAIN DESCRIPTION - ONSET
ONSET: ON-GOING

## 2019-07-18 ASSESSMENT — PAIN SCALES - GENERAL
PAINLEVEL_OUTOF10: 2
PAINLEVEL_OUTOF10: 2
PAINLEVEL_OUTOF10: 3
PAINLEVEL_OUTOF10: 2
PAINLEVEL_OUTOF10: 0
PAINLEVEL_OUTOF10: 4

## 2019-07-18 ASSESSMENT — PAIN DESCRIPTION - ORIENTATION
ORIENTATION: LOWER
ORIENTATION: LOWER

## 2019-07-18 ASSESSMENT — PAIN DESCRIPTION - FREQUENCY
FREQUENCY: INTERMITTENT
FREQUENCY: INTERMITTENT
FREQUENCY: CONTINUOUS

## 2019-07-18 ASSESSMENT — PAIN DESCRIPTION - DESCRIPTORS
DESCRIPTORS: ACHING;DISCOMFORT
DESCRIPTORS: ACHING
DESCRIPTORS: ACHING;DISCOMFORT

## 2019-07-18 ASSESSMENT — PAIN DESCRIPTION - PAIN TYPE
TYPE: ACUTE PAIN

## 2019-07-18 ASSESSMENT — PAIN DESCRIPTION - LOCATION
LOCATION: NECK

## 2019-07-18 ASSESSMENT — PAIN DESCRIPTION - PROGRESSION
CLINICAL_PROGRESSION: NOT CHANGED
CLINICAL_PROGRESSION: NOT CHANGED

## 2019-07-18 NOTE — PROGRESS NOTES
Endurance Training, Gait Training, Stair training, Neuromuscular Re-education, Safety Education & Training, Home Exercise Program, Equipment Evaluation, Education, & procurement, Patient/Caregiver Education & Training  Safety Devices  Type of devices:  All fall risk precautions in place, Gait belt  Restraints  Initially in place: No     Therapy Time   Individual Concurrent Group Co-treatment   Time In 0900         Time Out 0945         Minutes 45         Timed Code Treatment Minutes: 39 Minutes      Second Session Therapy Time     Individual Co-treatment   Time In [facility]     Time Out [place] Kathleen Saleem, [address]

## 2019-07-18 NOTE — PROGRESS NOTES
Supplement  DIET GENERAL; Dysphagia II Mechanically Altered; Nectar Thick   Communication: []WFL  [] Aphasia  [] Dysarthria  [] Apraxia  [] Pragmatic Impairment [] Non-verbal  [] Hearing Loss  [x] Other: Extra time and intermittent assist with complex   Cognition: [] WFL  [x] Mild  [x] Moderate  [] Severe [] Unable to Assess  [] Other:  Memory: [] WFL  [x] Mild  [x] Moderate  [] Severe [] Unable to Assess  [] Other:  Behavior: [x] Alert  [x] Cooperative  []  Pleasant  [] Confused  [] Agitated  [] Uncooperative  [] Distractible [] Motivated  [] Self-Limiting [] Anxious  [] Other:  Endurance:  [x] Adequate for participation in SLP sessions  [] Reduced overall  [] Lethargic  [] Other:  Safety: [] No concerns at this time  [] Reduced insight into deficits  []  Reduced safety awareness [] Not following call light procedures  [] Unable to Assess  [] Other:  Swallowing Precautions: Alternate solids and liquids;Upright as possible for all oral intake;Small bites/sips;Swallow 2 times per bite/sip; Remain upright for 30-45 minutes after meals;Effortful swallow;  Modified supraglottic swallow   Barriers toward progress: none; unless medical  Discharge recommendations:  [] Home independently  [x] Home with assistance [x]  24 hour supervision  [] ECF [] Other:  Continued Tx Upon Discharge: ? [x] Yes [] No [] TBD based on progress while on ARU [] Vital Stim indicated [] Other:   Estimated discharge date: TBD         Date: 7/18/2019      Tx session 1 Tx session 2   Total Timed Code Min SLP Individual Minutes  Time In: 6680  Time Out: 1430  Minutes: 39  Coded treatment time: 25    n/a   Group Treatment Minutes 0 0   Co-Treat Minutes 0 0   Variance/Reason:  n/a    Pain n/a    Pain Intervention [] RN notified  [] Repositioned  [] Intervention offered and patient declined  [] N/A  [x] Other: RN was in room giving med pass upon arrival.  [] RN notified  [] Repositioned  [] Intervention offered and patient declined  [] N/A  [] Other: chips: training in swallow strategy (lingual hold, effortful swallow, volitional throat clear, and clearance swallow):  ·  6/6ppeared tolerated without overt clinical s/s post swallow technique and without voice quality changes or pt complaints post swallow  -Tin H20 via cup: with lingual hold; effortful swallow and modified supraglottic swallow (swallow, volitional throat clear and clearance swallow):   · 9/10 appeared tolerated without overt clinical s/s post swallow technique and without voice quality changes or pt complaints post swallow technique  · The episode with thin liquid occurred when following dental soft trial   n/a     Cognitive-communication Goals: Get his ability to find words and thoughts back    Therapy working toward pt goal n/a   Goal 1: Pt will particiapte in further assessment of higher level cognitive linguistic skills for advanced daily living executive function    Goal met 7/16/2019 n/a   Goal 2: Pt will demonstrate improved word retrieval and thought organization for complex naming and concept/event explanation tasks with intermittent clarification cues  Conversational exchange: Pt did not appear to have any word finding/thought organization difficulties  n/a   Goal 3: New goal  Pt will demonstrate improved recall for new information and daily events with set up; use of compensatory strategies with <mild assist.    Short Term Memory:  · Recall of swallowing compensatory strategies: pt verbalized  · Recall of lingual/laryngeal exercises from previous day: Pt verbalized 6 ex IND (\"I've been using my list\")  · Recall of aspiration precautions: max review   · REcall of anticipated d/c date: IND  · Recall of planned repeat MBSS: external cues warranted     Working memory for manipulation of 3 part information: set up and intermittent mild assist    Goal 4: New goal  Pt will demonstrate improved executive function for higher level attention tasks with set up and intermittent cues  PS / reasoning

## 2019-07-18 NOTE — PROGRESS NOTES
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 Patient seen this afternoon sitting up in room. Labs notable for elevated WBC. UCx + for proteus, will start on Cipro. Patient did have mild dysuria, cloudy urine, and nausea today. ROS: No f/c, n/v, cp     Objective:  Patient Vitals for the past 24 hrs:   BP Temp Temp src Pulse Resp SpO2 Weight   19 0830 (!) 121/47 -- -- 64 -- -- --   19 0549 -- -- -- -- -- -- 184 lb 4.9 oz (83.6 kg)   19 0405 113/62 97.8 °F (36.6 °C) Oral 56 16 93 % --   19 2049 120/70 -- -- -- -- -- --   19 1608 127/63 97.4 °F (36.3 °C) Oral 58 18 96 % --     Const: Alert. No distress, pleasant. HEENT: Left frontal abrasion. Normal sclera. MMM. [place] J in place. CV: Regular rate and rhythm. Resp: No respiratory distress. Lungs CTAB. Abd: Soft, nontender, nondistended, NABS+   Ext: Trace pedal edema. Neuro: Alert, oriented, appropriately interactive. RUE weakness, coordination and sensory deficit. Psych: Cooperative, appropriate mood and affect    Laboratory data: Available via EMR.    Last 24 hour lab  Recent Results (from the past 24 hour(s))   POCT Glucose    Collection Time: 19  4:46 PM   Result Value Ref Range    POC Glucose 132 (H) 70 - 99 mg/dl    Performed on ACCU-CHEK    POCT Glucose    Collection Time: 19  8:42 PM   Result Value Ref Range    POC Glucose 194 (H) 70 - 99 mg/dl    Performed on ACCU-CHEK    CBC Auto Differential    Collection Time: 19  5:59 AM   Result Value Ref Range    WBC 13.9 (H) 4.0 - 11.0 K/uL    RBC 4.16 (L) 4.20 - 5.90 M/uL    Hemoglobin 12.8 (L) 13.5 - 17.5 g/dL    Hematocrit 39.0 (L) 40.5 - 52.5 %    MCV 93.7 80.0 - 100.0 fL    MCH 30.8 26.0 - 34.0 pg    MCHC 32.9 31.0 -

## 2019-07-18 NOTE — PROGRESS NOTES
and significant troponin elevation/possible NSTEMI (managed medically). Now presents to ARU with impaired mobility and self-care below his baseline. Currently, he reports improvement in upper extremity weakness/paresthesias. He feels his LUE has returned to baseline but he has persistent abnormal sensation in the right hand, weakness throughout the right arm, and difficulty with fine motor coordination. He reports poor balance but no focal leg weakness or numbness. He has moderate neck pain with radiation to the right shoulder. Described mostly as \"tightness. \" He is urinating volitionally but reports frequency. He has not had BM in at least 2 days. He is motivated to start inpatient rehab program. \" PMH includes: ankylosing spondylitis, basal cell carcinoma, DM II, hyperlipidemia, , hypertension, varicella, TURP. Response to previous treatment: Patient with no complaints from previous session  Family / Caregiver Present: No  Referring Practitioner: Dr. Maida Bernal  Diagnosis: ground level fall with hyperextension at neck and fracture. Occiput-C4 fusion 7/6  Subjective  Subjective: Pt met in room, seated in recliner. Pt reports \"moderate pain\" and rates 3/10. Pt reports he just Tylenol and muscle relaxer recently. General Comment  Comments: Pt goes by [email or web address]. \"           Objective    ADL  Grooming: Modified independent (seated for oral hygiene, facial & hand care, combing hair)  UE Bathing: Modified independent (seated at sink for sponge bath)  LE Bathing: Supervision(pt crossed BLEs to bathe/dry feet.  Cues to bathe buttocks, but pt able to complete in stance with supervision.)  UE Dressing: Minimal assistance(assist to manage shirt over head & brace)  LE Dressing: Supervision(crosses BLEs to thread pants, don/doff shoes & socks)  Toileting: Supervision;Modified independent (voids urine)  Additional Comments: Pt's wife will come in for therapy at 1:00 pm. Will therefore, wait until then to change pads on [place] J in order to provide education to wife. Instrumental ADL's  Instrumental ADLs: Yes  Commmunity Re-entry  Community Re-Entry: Pt again asked about driving and says his wife is wondering when he can drive. OT reminds pt that he will need to be cleared to drive by his surgeon once his cervical restrictions are lifted. OT reminds pt that it would be unsafe to drive at this time due to his cervical precautions and due to limited neck mobility at this time due to [place] J and cervical precautions. Pt concerned about his neck mobilty for driving, and OT reminds pt he has the handout for 's evaluation program when able to resume driving. When pt calls his wife to see if she is coming to therapy, wife asks about driving and wondering about pt practicing pumping a gas/brake pedal. OT educates pt's wife that this is something that could be addressed with 's training program, but pt's main issue will be his neck ROM/mobility but it is too early to know at this time as he is not currently allowed to move his neck. Balance  Sitting Balance: Modified independent ([place] J)  Standing Balance: (supervision/mod I static stance)    Functional Mobility  Functional - Mobility Device: No device  Activity: To/from bathroom  Assist Level: Supervision  Functional Mobility Comments: short distances in room to participate in ADLs. Pt steady, no LOB. Toilet Transfers  Toilet - Technique: Ambulating  Equipment Used: Grab bars  Toilet Transfer: Supervision;Modified independent  Toilet Transfers Comments: via no AD, comfort height commode    Wheelchair Bed Transfers  Wheelchair/Bed - Technique: Ambulating  Equipment Used: Wheelchair; Other(recliner)  Level of Asssistance: Supervision;Modified independent   Wheelchair Transfers Comments: no AD                                Cognition  Overall Cognitive Status: WFL  Cognition Comment: grossly WFL, though some mild memory impairments noted           PM SESSION    Pt met bedside,

## 2019-07-19 ENCOUNTER — APPOINTMENT (OUTPATIENT)
Dept: GENERAL RADIOLOGY | Age: 84
DRG: 949 | End: 2019-07-19
Attending: PHYSICAL MEDICINE & REHABILITATION
Payer: MEDICARE

## 2019-07-19 LAB
BASOPHILS ABSOLUTE: 0.1 K/UL (ref 0–0.2)
BASOPHILS RELATIVE PERCENT: 0.7 %
EOSINOPHILS ABSOLUTE: 0.6 K/UL (ref 0–0.6)
EOSINOPHILS RELATIVE PERCENT: 4.5 %
GLUCOSE BLD-MCNC: 116 MG/DL (ref 70–99)
GLUCOSE BLD-MCNC: 132 MG/DL (ref 70–99)
GLUCOSE BLD-MCNC: 145 MG/DL (ref 70–99)
HCT VFR BLD CALC: 35.8 % (ref 40.5–52.5)
HEMOGLOBIN: 12.2 G/DL (ref 13.5–17.5)
LYMPHOCYTES ABSOLUTE: 1.9 K/UL (ref 1–5.1)
LYMPHOCYTES RELATIVE PERCENT: 13.7 %
MCH RBC QN AUTO: 31.5 PG (ref 26–34)
MCHC RBC AUTO-ENTMCNC: 33.9 G/DL (ref 31–36)
MCV RBC AUTO: 92.7 FL (ref 80–100)
MONOCYTES ABSOLUTE: 0.8 K/UL (ref 0–1.3)
MONOCYTES RELATIVE PERCENT: 6 %
NEUTROPHILS ABSOLUTE: 10.4 K/UL (ref 1.7–7.7)
NEUTROPHILS RELATIVE PERCENT: 75.1 %
PDW BLD-RTO: 13.2 % (ref 12.4–15.4)
PERFORMED ON: ABNORMAL
PLATELET # BLD: 347 K/UL (ref 135–450)
PMV BLD AUTO: 8 FL (ref 5–10.5)
RBC # BLD: 3.86 M/UL (ref 4.2–5.9)
WBC # BLD: 13.8 K/UL (ref 4–11)

## 2019-07-19 PROCEDURE — 6360000002 HC RX W HCPCS: Performed by: PHYSICAL MEDICINE & REHABILITATION

## 2019-07-19 PROCEDURE — 97116 GAIT TRAINING THERAPY: CPT

## 2019-07-19 PROCEDURE — 6370000000 HC RX 637 (ALT 250 FOR IP): Performed by: PHYSICAL MEDICINE & REHABILITATION

## 2019-07-19 PROCEDURE — 97110 THERAPEUTIC EXERCISES: CPT

## 2019-07-19 PROCEDURE — 1280000000 HC REHAB R&B

## 2019-07-19 PROCEDURE — 74230 X-RAY XM SWLNG FUNCJ C+: CPT

## 2019-07-19 PROCEDURE — 92526 ORAL FUNCTION THERAPY: CPT

## 2019-07-19 PROCEDURE — 97127 HC SP THER IVNTJ W/FOCUS COG FUNCJ: CPT

## 2019-07-19 PROCEDURE — 85025 COMPLETE CBC W/AUTO DIFF WBC: CPT

## 2019-07-19 PROCEDURE — 97535 SELF CARE MNGMENT TRAINING: CPT

## 2019-07-19 PROCEDURE — 92611 MOTION FLUOROSCOPY/SWALLOW: CPT

## 2019-07-19 PROCEDURE — 97530 THERAPEUTIC ACTIVITIES: CPT

## 2019-07-19 PROCEDURE — 36415 COLL VENOUS BLD VENIPUNCTURE: CPT

## 2019-07-19 PROCEDURE — 51798 US URINE CAPACITY MEASURE: CPT

## 2019-07-19 RX ORDER — METHOCARBAMOL 750 MG/1
750 TABLET, FILM COATED ORAL 3 TIMES DAILY PRN
Qty: 20 TABLET | Refills: 0 | Status: SHIPPED | OUTPATIENT
Start: 2019-07-19 | End: 2019-07-26

## 2019-07-19 RX ORDER — CIPROFLOXACIN 500 MG/1
500 TABLET, FILM COATED ORAL EVERY 12 HOURS SCHEDULED
Qty: 14 TABLET | Refills: 0 | Status: SHIPPED | OUTPATIENT
Start: 2019-07-19 | End: 2019-07-26

## 2019-07-19 RX ORDER — POLYETHYLENE GLYCOL 3350 17 G/17G
17 POWDER, FOR SOLUTION ORAL 2 TIMES DAILY
Qty: 527 G | Refills: 0 | COMMUNITY
Start: 2019-07-19 | End: 2019-08-18

## 2019-07-19 RX ADMIN — ACETAMINOPHEN 650 MG: 325 TABLET ORAL at 05:36

## 2019-07-19 RX ADMIN — DOCUSATE SODIUM 100 MG: 100 CAPSULE, LIQUID FILLED ORAL at 20:02

## 2019-07-19 RX ADMIN — HEPARIN SODIUM 5000 UNITS: 5000 INJECTION INTRAVENOUS; SUBCUTANEOUS at 21:51

## 2019-07-19 RX ADMIN — HEPARIN SODIUM 5000 UNITS: 5000 INJECTION INTRAVENOUS; SUBCUTANEOUS at 05:34

## 2019-07-19 RX ADMIN — METFORMIN HYDROCHLORIDE 1000 MG: 500 TABLET, EXTENDED RELEASE ORAL at 09:07

## 2019-07-19 RX ADMIN — DOCUSATE SODIUM 100 MG: 100 CAPSULE, LIQUID FILLED ORAL at 09:07

## 2019-07-19 RX ADMIN — HYDROCHLOROTHIAZIDE 25 MG: 25 TABLET ORAL at 09:07

## 2019-07-19 RX ADMIN — Medication 3 MG: at 21:50

## 2019-07-19 RX ADMIN — SIMVASTATIN 80 MG: 40 TABLET, FILM COATED ORAL at 20:02

## 2019-07-19 RX ADMIN — CIPROFLOXACIN HYDROCHLORIDE 500 MG: 500 TABLET, FILM COATED ORAL at 09:07

## 2019-07-19 RX ADMIN — METHOCARBAMOL TABLETS 750 MG: 750 TABLET, COATED ORAL at 21:50

## 2019-07-19 RX ADMIN — CIPROFLOXACIN HYDROCHLORIDE 500 MG: 500 TABLET, FILM COATED ORAL at 20:02

## 2019-07-19 RX ADMIN — METOPROLOL SUCCINATE 25 MG: 25 TABLET, FILM COATED, EXTENDED RELEASE ORAL at 09:07

## 2019-07-19 RX ADMIN — CITALOPRAM HYDROBROMIDE 20 MG: 20 TABLET ORAL at 09:07

## 2019-07-19 RX ADMIN — ASPIRIN 325 MG: 325 TABLET, DELAYED RELEASE ORAL at 09:07

## 2019-07-19 RX ADMIN — AMLODIPINE BESYLATE 10 MG: 10 TABLET ORAL at 20:02

## 2019-07-19 RX ADMIN — ACETAMINOPHEN 650 MG: 325 TABLET ORAL at 12:15

## 2019-07-19 RX ADMIN — ACETAMINOPHEN 650 MG: 325 TABLET ORAL at 20:02

## 2019-07-19 RX ADMIN — HEPARIN SODIUM 5000 UNITS: 5000 INJECTION INTRAVENOUS; SUBCUTANEOUS at 12:37

## 2019-07-19 RX ADMIN — POLYETHYLENE GLYCOL 3350 17 G: 17 POWDER, FOR SOLUTION ORAL at 09:07

## 2019-07-19 ASSESSMENT — PAIN - FUNCTIONAL ASSESSMENT
PAIN_FUNCTIONAL_ASSESSMENT: ACTIVITIES ARE NOT PREVENTED

## 2019-07-19 ASSESSMENT — PAIN SCALES - GENERAL
PAINLEVEL_OUTOF10: 2
PAINLEVEL_OUTOF10: 2
PAINLEVEL_OUTOF10: 0
PAINLEVEL_OUTOF10: 2
PAINLEVEL_OUTOF10: 0

## 2019-07-19 ASSESSMENT — PAIN DESCRIPTION - ORIENTATION
ORIENTATION: LOWER
ORIENTATION: POSTERIOR

## 2019-07-19 ASSESSMENT — PAIN DESCRIPTION - PAIN TYPE
TYPE: ACUTE PAIN

## 2019-07-19 ASSESSMENT — PAIN DESCRIPTION - PROGRESSION
CLINICAL_PROGRESSION: NOT CHANGED

## 2019-07-19 ASSESSMENT — PAIN DESCRIPTION - LOCATION
LOCATION: NECK

## 2019-07-19 ASSESSMENT — PAIN DESCRIPTION - FREQUENCY
FREQUENCY: INTERMITTENT

## 2019-07-19 ASSESSMENT — PAIN DESCRIPTION - ONSET
ONSET: ON-GOING

## 2019-07-19 ASSESSMENT — PAIN DESCRIPTION - DESCRIPTORS
DESCRIPTORS: PRESSURE;OTHER (COMMENT)
DESCRIPTORS: ACHING;DISCOMFORT

## 2019-07-19 NOTE — PROGRESS NOTES
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 Cognitive Diagnosis: Minimal to mild deficits in divided and alternating attention. Pt impulsive during testing. Planned further assessment as pt self reports particiaptes in home management, med management and health management. 2. Speech Diagnosis: Audible, intelligible, fluent speech. 3. Communication Diagnosis: [place] language skills intact. Pt with complaints of word finding but not demonstrated on CFN test or for test samples for concept definitions. Ongoing assessment with additional goals to be determined if warranted. Pt is expressing basic needs and wants and is providing concept descriptions/explanations at multiple word utterance level. 4. Dysphagia Diagnosis: Mild to Moderate Oropharyngeal dysphagia characterized by reduced lingual coordination for bolus control, concern for reduced tongue base retraction, delayed initiation trigger of swallow, reduced laryngeal elevation, concern for reduced pharyngeal clearance.  Pt had a MBSS at the other facility with [facility] with nectar thick liquids  Date of Admit: 7/11/2019  Room #: B2Y-2112/3259-01  Date: 7/19/2019       Current functional status (updated daily):         Pt being seen for : [] Speech/Language Treatment  [x] Dysphagia Treatment [x] Cognitive Treatment  [] Other:  Current Diet Order:Dietary Nutrition Supplements: Frozen Oral Supplement  DIET GENERAL; Dysphagia II Mechanically Altered; Nectar Thick   Communication: []WFL  [] Aphasia  [] Dysarthria  [] Apraxia  [] Pragmatic Impairment [] Non-verbal  [] Hearing Loss  [x] Other: Extra time and intermittent assist with complex   Cognition: [] WFL  [x] Mild  [] Moderate  [] Severe [] Unable to Assess  [] Other:  Memory: [] WFL  [x] Mild  [] Moderate  [] Severe [] Unable to Assess  [] Other:  Behavior: [x] Alert  [x] Cooperative  [x]  Pleasant  [] Confused  [] Agitated  [] Uncooperative  [] Distractible [] Motivated  [] Self-Limiting [] Anxious  [] Other:  Endurance:  [x] Adequate for participation in SLP sessions  [] Reduced overall  [] Lethargic  [] Other:  Safety: [] No concerns at this time  [] Reduced insight into deficits  []  Reduced safety awareness [] Not following call light procedures  [] Unable to Assess  [] Other:  Swallowing Precautions: Alternate solids and liquids;Upright as possible for all oral intake;Small bites/sips;Swallow 2 times per bite/sip; Remain upright for 30-45 minutes after meals;Effortful swallow;  Modified supraglottic swallow   Barriers toward progress: none; unless medical  Discharge recommendations:  [] Home independently  [x] Home with assistance [x]  24 hour supervision  [] ECF [] Other:  Continued Tx Upon Discharge: ? [x] Yes [] No [] TBD based on progress while on ARU [] Vital Stim indicated [] Other:   Estimated discharge date: 7/20/2019         Date: 7/19/2019      Tx session 1 Tx session 2   Total Timed Code Min MBSS completed   n/a   Group Treatment Minutes 0 0   Co-Treat Minutes 0 0   Variance/Reason:  n/a    Pain n/a    Pain Intervention [] RN notified  [] Repositioned  [] Intervention offered and patient declined  [] N/A  [x] Other: RN was in room giving med pass upon arrival.  [] RN notified  [] Repositioned  [] Intervention offered and patient declined  [] N/A  [] Other:   Subjective     Pt seen in Videofluoroscopic room  Pt was oriented to purpose

## 2019-07-19 NOTE — PROGRESS NOTES
(L) 13.5 - 17.5 g/dL    Hematocrit 35.8 (L) 40.5 - 52.5 %    MCV 92.7 80.0 - 100.0 fL    MCH 31.5 26.0 - 34.0 pg    MCHC 33.9 31.0 - 36.0 g/dL    RDW 13.2 12.4 - 15.4 %    Platelets 710 656 - 485 K/uL    MPV 8.0 5.0 - 10.5 fL    Neutrophils % 75.1 %    Lymphocytes % 13.7 %    Monocytes % 6.0 %    Eosinophils % 4.5 %    Basophils % 0.7 %    Neutrophils # 10.4 (H) 1.7 - 7.7 K/uL    Lymphocytes # 1.9 1.0 - 5.1 K/uL    Monocytes # 0.8 0.0 - 1.3 K/uL    Eosinophils # 0.6 0.0 - 0.6 K/uL    Basophils # 0.1 0.0 - 0.2 K/uL   POCT Glucose    Collection Time: 07/19/19  7:19 AM   Result Value Ref Range    POC Glucose 116 (H) 70 - 99 mg/dl    Performed on ACCU-CHEK    POCT Glucose    Collection Time: 07/19/19 11:59 AM   Result Value Ref Range    POC Glucose 132 (H) 70 - 99 mg/dl    Performed on ACCU-CHEK        Therapy progress:  PT  Required Braces or Orthoses  Cervical: [place] J(\"[place] J collar when HOB > 30 degreess and when Out of bed, ambulatory. \" \"If given a collar, expect to wear the collar for 6 weeks\")  Position Activity Restriction  Spinal Precautions: No Lifting, No Bending, No Twisting(\"no heavy lifting over 10 pounds\" \"no lifting of objects over your head\"  \"No bending at the [facility] with your knees. \")  Spinal Precautions: \"Walking is encoruaged. No other form of exercises until follow up appointment. \"  Other position/activity restrictions: diet: dysphagia II mechanically altered, nectar thick. Ice chips between meals. \"[place] J collar when OOB or if HOB elevated >30 degrees. \" Per RN note 7/11/19: \"RN called  Dr Barbara Hale office to clarify orders for [facility] J collar. RN notified Dr Miriam Andrade how orders were written in discharge orders were correct and patient notified. [place] J collar is place if patient HOB >30 or up and ambulating or in chair. Patient may remove collar if HOB is less than 30 degrees.  \"  Objective     Sit to Stand: Modified independent  Stand to sit: Modified independent  Device: No

## 2019-07-19 NOTE — PLAN OF CARE
Problem: Falls - Risk of:  Goal: Will remain free from falls  Description  Will remain free from falls  7/17/2019 1152 by Rehan Haynes RN  Outcome: Ongoing  7/17/2019 0313 by Zoe Wilson RN  Note:   Patient free from falls this shift. Fall precautions in place at all times. Bed in lowest position with two side rails up and wheels locked. Call light within reach. Patient able and agreeable to contact for safety appropriately. Up CGA    Problem: Risk for Impaired Skin Integrity  Goal: Tissue integrity - skin and mucous membranes  Description  Structural intactness and normal physiological function of skin and  mucous membranes. 7/17/2019 1152 by Rehan Haynes RN  Outcome: Ongoing  7/17/2019 0313 by Zoe Wilson RN  Note:   Skin assessment performed each shift per protocol. Patient turns and repositions self. Patient is continent of bowel and bladder. Bottom without open areas or redness. Patient is complaining of hemorrhoid pain, no blood noted when patient wiped bottom, tucks pads provided for comfort. Problem: IP BLADDER/VOIDING  Goal: LTG - patient will complete bladder elimination  Outcome: Ongoing     Problem: IP BOWEL ELIMINATION  Goal: LTG - patient will utilize adaptive techniques/equipment to complete bowel elimination  7/17/2019 1152 by Rehan Haynes RN  Outcome: Ongoing  7/17/2019 0313 by Zoe Wilson RN  Note:   Patient with complaints of constipation throughout the day. PRN medications provided on days with small relief. Patient with large BM this evening, after encouraging fluids and warm prune juice.       Problem: PAIN  Goal: LTG - Patient will manage pain with the appropriate technique/Intervention  Outcome: Ongoing     Problem: Discharge Planning:  Goal: Discharged to appropriate level of care  Description  Discharged to appropriate level of care  Outcome: Ongoing
Problem: Falls - Risk of:  Goal: Will remain free from falls  Description  Will remain free from falls  7/18/2019 0927 by Jerry Forde RN  Outcome: Ongoing  7/18/2019 0122 by Suyapa Palacio RN  Outcome: Ongoing  Goal: Absence of physical injury  Description  Absence of physical injury  7/18/2019 0927 by Jerry Forde RN  Outcome: Ongoing  7/18/2019 0122 by Suyapa Palacio RN  Outcome: Ongoing     Problem: Risk for Impaired Skin Integrity  Goal: Tissue integrity - skin and mucous membranes  Description  Structural intactness and normal physiological function of skin and  mucous membranes.   7/18/2019 7713 by Jerry Forde RN  Outcome: Ongoing  7/18/2019 0122 by Suyapa Palacio RN  Outcome: Ongoing     Problem: IP BLADDER/VOIDING  Goal: LTG - patient will complete bladder elimination  7/18/2019 0927 by Jerry Forde RN  Outcome: Ongoing  7/18/2019 0122 by Suyapa Palacio RN  Outcome: Ongoing  Goal: LTG - Patient will utilize adaptive techniques/equipment to complete bladder elimination  7/18/2019 0927 by Jerry Forde RN  Outcome: Ongoing  7/18/2019 0122 by Suyapa Palacio RN  Outcome: Ongoing  Goal: LTG - patient will achieve acceptable level of continence  7/18/2019 0927 by Jerry Forde RN  Outcome: Ongoing  7/18/2019 0122 by Suyapa Palacio RN  Outcome: Ongoing     Problem: IP BOWEL ELIMINATION  Goal: LTG - patient will utilize adaptive techniques/equipment to complete bowel elimination  7/18/2019 0927 by Jerry Forde RN  Outcome: Ongoing  7/18/2019 0122 by Suyapa Palacio RN  Outcome: Ongoing  Goal: LTG - patient will have regular and routine bowel evacuation  7/18/2019 0927 by Jerry Forde RN  Outcome: Ongoing  7/18/2019 0122 by Suyapa Palacio RN  Outcome: Ongoing  Goal: STG - Patient participates in bowel management program  7/18/2019 0927 by Jerry Forde RN  Outcome: Ongoing  7/18/2019 0122 by Suyapa Palacio RN  Outcome: Ongoing     Problem: PAIN  Goal: LTG - Patient will manage pain with the appropriate
Problem: Falls - Risk of:  Goal: Will remain free from falls  Description  Will remain free from falls  Outcome: Ongoing  Note:   Calls for needs, doing well with walker     Problem: Risk for Impaired Skin Integrity  Goal: Tissue integrity - skin and mucous membranes  Description  Structural intactness and normal physiological function of skin and  mucous membranes. Outcome: Ongoing  Note:   Staples to incision and sutures, no drainage, no issues noted     Problem: IP BLADDER/VOIDING  Goal: LTG - patient will complete bladder elimination  Outcome: Ongoing  Note:   Using bathroom, no issues noted. Problem: IP BOWEL ELIMINATION  Goal: LTG - patient will utilize adaptive techniques/equipment to complete bowel elimination  Outcome: Ongoing  Note:   Monitor bowels offer laxatives as needed, fluids offers, (on thickened liquids). Problem: PAIN  Goal: LTG - Patient will manage pain with the appropriate technique/Intervention  Outcome: Ongoing  Note:   Reviewed pain management, continue to monitor, also using rest for relief, states shower also helpful. Problem: Discharge Planning:  Goal: Discharged to appropriate level of care  Description  Discharged to appropriate level of care  Outcome: Ongoing  Note:   Aware of rehab care conference and discharge planning needs. Problem: Cerebrospinal Fluid Leakage - Risk Of:  Goal: Absence of cerebrospinal fluid drainage at surgical site  Description  Absence of cerebrospinal fluid drainage at surgical site  Outcome: Ongoing  Note:   No drainage noted, monitor. Problem: Infection - Surgical Site:  Goal: Will show no infection signs and symptoms  Description  Will show no infection signs and symptoms  Outcome: Ongoing  Note:   No drainage noted, incision looks good, denies any rubbing from collar.
Problem: Falls - Risk of:  Goal: Will remain free from falls  Description  Will remain free from falls  Outcome: Ongoing  Note:   Patient is a high fall risk. Patient free of falls this shift. Bed low, locked and alarmed at all times. Call light and bedside table is within reach. Arm band on wrist, fall light on outside of room, and nonskid socks are on patient. Notified patient to ask for assistance when needed. Patient verbalized understanding. Problem: Risk for Impaired Skin Integrity  Goal: Tissue integrity - skin and mucous membranes  Description  Structural intactness and normal physiological function of skin and  mucous membranes. Outcome: Ongoing  Note:   Patient is at risk for impaired skin integrity. Offered to turn and reposition patient every two hours and as needed. Skin is assessed every shift and prn. Patient has pillow support, to help reduce skin breakdown. Barrier cream applied for incontinence as needed. Will continue to monitor. Problem: IP BLADDER/VOIDING  Goal: LTG - patient will complete bladder elimination  Outcome: Ongoing  Note:   Pt is continent of bladder and calls approprietly. Will continue to monitor. Problem: IP BOWEL ELIMINATION  Goal: LTG - patient will have regular and routine bowel evacuation  Outcome: Ongoing     Problem: Infection - Surgical Site:  Goal: Will show no infection signs and symptoms  Description  Will show no infection signs and symptoms  Outcome: Ongoing  Note:   Pt is showing no signs and symptoms of infection. Pt educated on signs and symptoms of infection to watch out for. Pt is agreeable to notify nurse if signs start to show. Handwashing is performed upon entering patients to reduce the risk of infection. Will continue to assess and monitor.         Problem: Pain - Acute:  Goal: Pain level will decrease  Description  Pain level will decrease  Outcome: Ongoing  Note:   Provided patient with pain medicine per request  for posterior neck
Problem: Serum Glucose Level - Abnormal:  Intervention: Glucose management  Note:   Continue to monitor blood sugars on medication and sliding scale
with no urinary retention >300ml in bladder  [] Complete bladder protocol with valle removal  [x] Maintain O2 SATs at 92%  [x] Have pain managed while on ARU       [] Be pain free by discharge   [x] Have no skin breakdown while on ARU  [] Have improved skin integrity via wound measurements  [x] Have no signs/symptoms of infection at the incision site  [x] Be free from injury during hospitalization   [x] Complete education with patient/family with understanding demonstrated for:  [x] Adjustment   [x] Other: neck safety, brace education, diabetic needs, diet and swallowing precautions   Nursing interventions may include bowel/bladder training, education for medical assistive devices, medication education, O2 saturation management, energy conservation, stress management techniques, fall prevention, alarms protocol, seating and positioning, skin/wound care, pressure relief instruction,dressing changes,  infection protection, DVT prophylaxis, and/or assistance with in room safety with transfers to bed, toilet, wheelchair, shower as well as bathroom activities and hygiene. Patient/caregiver education for:   [x] Disease/sustained injury/management      [x] Medication Use   [x] Surgical intervention   [x] Safety   [x] Body mechanics and or joint protection   [x] Health maintenance         PHYSICAL THERAPY:  Goals:                  Short term goals  Time Frame for Short term goals: 1-1.5 weeks   Short term goal 1: bed mobility mod I   Short term goal 2: transfer sit <-> sand mod I   Short term goal 3: ambulate with LRAD 200' mod I   Short term goal 4: ascend/descend 12 steps mod I with bilateral railing   Short term goal 5: ascend/descend curb step LRAD mod I             Long term goals  Time Frame for Long term goals : STG=LTG   These goals were reviewed with this patient at the time of assessment and [facility] is in agreement. Plan of Care: Pt to be seen 90   mins per day for 5 day/week 1-1.5 weeks.

## 2019-07-19 NOTE — CARE COORDINATION
LSW initiated referral to [facility], spoke to Lindsey.   [place], [place]     Case Management   [phone]    7/19/2019  2:48 PM

## 2019-07-19 NOTE — CARE COORDINATION
SOCIAL WORK DISCHARGE SUMMARY:      DISCHARGE DATE:                 Saturday, 7-      DISCHARGE PLACE:                [facility]                 HOME CARE AGENCY:            [facility]             PHONE NUMBER          [phone]             FAX NUMBER                 [phone]      TRANSPORTATION:                Wife             TIME:                                  10:01      PREFERRED PHARMACY:                  NUMBER:                           md orders:    SN/pt/ot/sp    DME:  None recommended.       [place], [place]     Case [facility]   [phone]    7/19/2019  2:23 PM

## 2019-07-19 NOTE — DISCHARGE INSTR - COC
Last documented pain score (0-10 scale): Pain Level: 2  Last Weight:   Wt Readings from Last 1 Encounters:   07/19/19 184 lb 4.9 oz (83.6 kg)     Mental Status:  oriented, alert, coherent, logical, thought processes intact and able to concentrate and follow conversation    IV Access:  - None    Nursing Mobility/ADLs:  Walking   Independent  Transfer  Independent  [address] Delivery   whole and prefers mixed with applesauce    Wound Care Documentation and Therapy:        Elimination:  Continence:   · Bowel: Yes  · Bladder: Yes  Urinary Catheter: None   Colostomy/Ileostomy/Ileal Conduit: No       Date of Last BM: 7/17/19    Intake/Output Summary (Last 24 hours) at 7/19/2019 1311  Last data filed at 7/19/2019 0828  Gross per 24 hour   Intake 420 ml   Output 1225 ml   Net -805 ml     I/O last 3 completed shifts: In: 900 [P.O.:900]  Out: 1100 [Urine:1100]    Safety Concerns:     History of Falls (last 30 days) and At Risk for Falls    Impairments/Disabilities:      None    Nutrition Therapy:  Current Nutrition Therapy:   - Oral Diet:  Dysphagia 2 mechanically altered    Routes of Feeding: Oral  Liquids: Thin Liquids  Daily Fluid Restriction: no  Last Modified Barium Swallow with Video (Video Swallowing Test): 7/19/19    Treatments at the Time of Hospital Discharge:   Respiratory Treatments: none  Oxygen Therapy:  is not on home oxygen therapy.   Ventilator:    - No ventilator support    Rehab Therapies: Physical Therapy, Occupational Therapy, Speech/Language Therapy   Weight Bearing Status/Restrictions: No weight bearing restirctions  Other Medical Equipment (for information only, NOT a DME order):  none  Other Treatments: none    Patient's personal belongings (please select all that are sent with patient):  Glasses    RN SIGNATURE:  Electronically signed by Hien Barnes RN on 7/20/19 at 7:40 AM    CASE MANAGEMENT/SOCIAL WORK SECTION    Inpatient Status Date: 7-    Readmission Risk Assessment Score:  Readmission Risk              Risk of Unplanned Readmission:        12           Discharging to Facility/ Agency   · Name:    [facility]  · Address:  · Phone:    [phone]  · Fax:        [phone]      / signature: Shira Cullen     Case Management   [phone]    7/19/2019  2:45 PM      PHYSICIAN SECTION    Prognosis: Good    Condition at Discharge: Stable    Rehab Potential (if transferring to Rehab): Good    Recommended Labs or Other Treatments After Discharge:   Home care PT, OT, SLP, RN, Aide  Repeat CBC and BMP within 1 week. [place] collar at all times when OOB or if head of bed is elevated >30 degrees. Follow-up with PCP in 1 week. Follow-up with Neurosurgery (Dr. Mary Cuevas) on 7/22  Follow-up with Cardiology in 2 weeks. Physician Certification: I certify the above information and transfer of [facility]  is necessary for the continuing treatment of the diagnosis listed and that he requires [address] for less 30 days.      Update Admission H&P: No change in H&P    PHYSICIAN SIGNATURE:  Electronically signed by Elisabet Kern MD on 7/19/19 at 1:12 PM

## 2019-07-19 NOTE — PROGRESS NOTES
Occupational Therapy  Facility/Department: [address] REHAB  Daily Treatment Note  NAME: Jayme Hairston  : 1930  MRN: [id number]    Date of Service: 2019    Discharge Recommendations:  S Level 1, Home with assist PRN, Home with Home health OT(potential eventual 's eval pending neck mobility once cervical restrictions lifted)  OT Equipment Recommendations  Equipment Needed: No    Assessment   Performance deficits / Impairments: Decreased functional mobility ; Decreased ADL status; Decreased endurance;Decreased balance;Decreased high-level IADLs  Assessment: Pt tolerated OT tx well. Pt is now mod I fxl transfers, mod I fxl mobility without assistive device, mod I bathing/shower, mod I LB dressing, and mod I UB dressing for shirt with assist only for [place] brace while rolling in bed. Pt has made great progress towards OT goals. Wife has previously come in for training on [place] J and did well assisting to don/doff, reported she felt comfortable with assisting pt. Pt plans to d/c home tomorrow (19) with assist prn, home OT level 1. Pt may need eventual 's eval pending his neck mobility once his cervical restrictions are lifted. Treatment Diagnosis: impaired ADLs, IADLs, fxl transfers/mobility, endurance  Prognosis: Good  Patient Education: ADL retraining, fxl transfers/mobility  REQUIRES OT FOLLOW UP: Yes  Activity Tolerance  Activity Tolerance: Patient Tolerated treatment well  Safety Devices  Safety Devices in place: Yes  Type of devices: Gait belt;Call light within reach;Nurse notified; Chair alarm in place; Left in chair(RN student & RN student instructor in room to give meds as OT left)         Patient Diagnosis(es): There were no encounter diagnoses. has a past medical history of Ankylosing spondylitis ([facility] [address] Basal cell carcinoma, Diabetes mellitus type II, controlled ([facility] [address] Hyperlipidemia, Hypertension, and Varicella.    has a past surgical history that includes TURP and cervical fusion. Restrictions  Restrictions/Precautions  Restrictions/Precautions: Fall Risk, Modified Diet, Swallowing - Thickened Liquids  Required Braces or Orthoses?: Yes  Required Braces or Orthoses  Cervical: [place] J(\"[place] collar when HOB > 30 degreess and when Out of bed, ambulatory. \" \"If given a collar, expect to wear the collar for 6 weeks\")  Position Activity Restriction  Spinal Precautions: No Lifting, No Bending, No Twisting(\"no heavy lifting over 10 pounds\" \"no lifting of objects over your head\"  \"No bending at the waistELY [facility] with your knees. \")  Spinal Precautions: \"Walking is encoruaged. No other form of exercises until follow up appointment. \"  Other position/activity restrictions: diet: dysphagia II mechanically altered, nectar thick. Ice chips between meals. \"[place] collar when OOB or if HOB elevated >30 degrees. \" Per RN note 7/11/19: \"RN called  Dr Delight Duverney office to clarify orders for [facility] collar. RN notified Dr Abhijit Everett how orders were written in discharge orders were correct and patient notified. [place] J collar is place if patient HOB >30 or up and ambulating or in chair. Patient may remove collar if HOB is less than 30 degrees. \"  Subjective   General  Chart Reviewed: Yes, Progress Notes, Orders, Labs  Patient assessed for rehabilitation services?: Yes  Additional Pertinent Hx: Per Dr. Conley Councilman H&P 7/11/19: \"Mr. Gabriela Gonzalez is an 79 yo M with pmh HTN, HLD, and DM2 who initially presented to  on 7/4/2019 with upper extremity weakness/paresthesias and urinary retention following a ground level fall. Patient was working in garden when he lost his balance and fall, striking his head on a step, resulting in hyperextension at the neck. He denied loss of consciousness. Imaging revealed type II dens fracture with 7mm posterior displacement and cord impingement, C1 left posterior arch and laminar fractures, and suspected dissection/thrombus in right vertebral artery V3 segment.  He underwent occiput-C4 fusion (7/6 with Dr. Tricia Collazo). Post-op course notable for pulmonary edema requiring diuresis, new onset dysphagia, and significant troponin elevation/possible NSTEMI (managed medically). Now presents to ARU with impaired mobility and self-care below his baseline. Currently, he reports improvement in upper extremity weakness/paresthesias. He feels his LUE has returned to baseline but he has persistent abnormal sensation in the right hand, weakness throughout the right arm, and difficulty with fine motor coordination. He reports poor balance but no focal leg weakness or numbness. He has moderate neck pain with radiation to the right shoulder. Described mostly as \"tightness. \" He is urinating volitionally but reports frequency. He has not had BM in at least 2 days. He is motivated to start inpatient rehab program. \" PMH includes: ankylosing spondylitis, basal cell carcinoma, DM II, hyperlipidemia, , hypertension, varicella, TURP. Response to previous treatment: Patient with no complaints from previous session  Family / Caregiver Present: No  Referring Practitioner: Dr. Bozena Jose  Diagnosis: ground level fall with hyperextension at neck and fracture. Occiput-C4 fusion 7/6  Subjective  Subjective: Pt met bedside, in recliner finished with breakfast. Pt reports pain 1/10 to neck and says he recently had Tylenol. Pt says he feels better today but is now having trouble with his bowels. RN notified pt asking about if he is receiving a laxative today. General Comment  Comments: Pt goes by [employer]. \"           Objective    ADL  Feeding: Modified independent ; Thickened liquids(finished with breakfast upon OT arrival. Dysphagia II mechanically altered diet, nectar thick liquid, ice chips between meals.)  Grooming: Modified independent (partly seated, partly in stance at sink)  UE Bathing: Modified independent   LE Bathing: Modified independent (crosses BLEs to bathe/dry feet)  UE Dressing: Stand by independent  Comment: flat bed, via log roll technique without any cues                             Cognition  Overall Cognitive Status: WFL  Cognition Comment: grossly WFL, though some mild memory impairments noted       PM SESSION    Pt met in room, seated in recliner. Pt pleasant and agreeable to OT tx and to go to dept. Pt reports pain 0.5/10 to neck from brace. IADL:  Meal Prep: Pt gathered items from upper cabinets (due to pt's height, able to reach without lifting arm over shoulder level), and he prepared soup in the microwave. Mod I, no AD. Light Housekeeping: Pt demo's good body mechanics to bend at his knees to reach  to place bowl on top shelf (after first rinsing in sink), and then used foot to kick up the  door (this is what he does at baseline). Mod I, no AD. Functional Transfers (via no AD):  Recliner: Mod I (sit>stand)  Wheelchair: Mod I  Bed: Mod I (stand>sit)  Toilet: Mod I (BSC set to comfort height (though pt reports his is taller at home). Vanity on right to simulate windowsill at home). Shower: Dry transfer. Mod I to step over ledge holding onto wall (has grab bar at home) and sit<>stand shower chair with back. Car: Mod I via sit and swing technique. Functional Mobility:  Mod I short distances in dept and room via no AD, steady and no LOB. ADL:  LB Dressing: Seated EOB, pt crossed BLEs to doff shoes. Toileting: Pt declines the need, though says he feels he will need to have a BM later. RN aware. Bed Mobility:  Sit>supine: Mod I via log roll technique, flat bed no rails. Hand assessment:  Pt is right hand dominant. 9 hole peg test:  RUE: 28.13\" (pt scored 33.2\" on 7/12/19). Norm: 25.79\" (SD: 5.60\")  LUE: 27.36\" (pt scored 30.35\" on 7/12/19).  Norm 25.95\" (SD: 4.54\")    Safety Device - Type of devices:  []  All fall risk precautions in place [x] Bed alarm in place  [x] Call light within reach [] Chair alarm in place [] Positioning belt [x] Gait belt

## 2019-07-20 ENCOUNTER — APPOINTMENT (OUTPATIENT)
Dept: GENERAL RADIOLOGY | Age: 84
DRG: 949 | End: 2019-07-20
Attending: PHYSICAL MEDICINE & REHABILITATION
Payer: MEDICARE

## 2019-07-20 VITALS
HEIGHT: 73 IN | WEIGHT: 184.53 LBS | HEART RATE: 62 BPM | DIASTOLIC BLOOD PRESSURE: 57 MMHG | BODY MASS INDEX: 24.46 KG/M2 | SYSTOLIC BLOOD PRESSURE: 113 MMHG | RESPIRATION RATE: 16 BRPM | TEMPERATURE: 97.8 F | OXYGEN SATURATION: 94 %

## 2019-07-20 LAB
BASOPHILS ABSOLUTE: 0.1 K/UL (ref 0–0.2)
BASOPHILS RELATIVE PERCENT: 0.6 %
EOSINOPHILS ABSOLUTE: 0.6 K/UL (ref 0–0.6)
EOSINOPHILS RELATIVE PERCENT: 3.9 %
HCT VFR BLD CALC: 38.7 % (ref 40.5–52.5)
HEMOGLOBIN: 12.7 G/DL (ref 13.5–17.5)
LYMPHOCYTES ABSOLUTE: 1.5 K/UL (ref 1–5.1)
LYMPHOCYTES RELATIVE PERCENT: 9.7 %
MCH RBC QN AUTO: 30.9 PG (ref 26–34)
MCHC RBC AUTO-ENTMCNC: 32.8 G/DL (ref 31–36)
MCV RBC AUTO: 94.1 FL (ref 80–100)
MONOCYTES ABSOLUTE: 1 K/UL (ref 0–1.3)
MONOCYTES RELATIVE PERCENT: 6.3 %
NEUTROPHILS ABSOLUTE: 12.4 K/UL (ref 1.7–7.7)
NEUTROPHILS RELATIVE PERCENT: 79.5 %
PDW BLD-RTO: 13.1 % (ref 12.4–15.4)
PLATELET # BLD: 392 K/UL (ref 135–450)
PMV BLD AUTO: 8.1 FL (ref 5–10.5)
RBC # BLD: 4.11 M/UL (ref 4.2–5.9)
WBC # BLD: 15.6 K/UL (ref 4–11)

## 2019-07-20 PROCEDURE — 36415 COLL VENOUS BLD VENIPUNCTURE: CPT

## 2019-07-20 PROCEDURE — 6360000002 HC RX W HCPCS: Performed by: PHYSICAL MEDICINE & REHABILITATION

## 2019-07-20 PROCEDURE — 6370000000 HC RX 637 (ALT 250 FOR IP): Performed by: PHYSICAL MEDICINE & REHABILITATION

## 2019-07-20 PROCEDURE — 71045 X-RAY EXAM CHEST 1 VIEW: CPT

## 2019-07-20 PROCEDURE — 85025 COMPLETE CBC W/AUTO DIFF WBC: CPT

## 2019-07-20 RX ADMIN — HEPARIN SODIUM 5000 UNITS: 5000 INJECTION INTRAVENOUS; SUBCUTANEOUS at 05:35

## 2019-07-20 RX ADMIN — ASPIRIN 325 MG: 325 TABLET, DELAYED RELEASE ORAL at 08:30

## 2019-07-20 RX ADMIN — DOCUSATE SODIUM 100 MG: 100 CAPSULE, LIQUID FILLED ORAL at 08:30

## 2019-07-20 RX ADMIN — METFORMIN HYDROCHLORIDE 1000 MG: 500 TABLET, EXTENDED RELEASE ORAL at 08:30

## 2019-07-20 RX ADMIN — CITALOPRAM HYDROBROMIDE 20 MG: 20 TABLET ORAL at 08:32

## 2019-07-20 RX ADMIN — CIPROFLOXACIN HYDROCHLORIDE 500 MG: 500 TABLET, FILM COATED ORAL at 08:30

## 2019-07-20 RX ADMIN — POLYETHYLENE GLYCOL 3350 17 G: 17 POWDER, FOR SOLUTION ORAL at 08:29

## 2019-07-20 RX ADMIN — HYDROCHLOROTHIAZIDE 25 MG: 25 TABLET ORAL at 08:32

## 2019-07-20 RX ADMIN — ACETAMINOPHEN 650 MG: 325 TABLET ORAL at 05:41

## 2019-07-20 RX ADMIN — METHOCARBAMOL TABLETS 750 MG: 750 TABLET, COATED ORAL at 08:31

## 2019-07-20 ASSESSMENT — PAIN SCALES - GENERAL
PAINLEVEL_OUTOF10: 0
PAINLEVEL_OUTOF10: 3
PAINLEVEL_OUTOF10: 2
PAINLEVEL_OUTOF10: 0

## 2019-07-20 ASSESSMENT — PAIN DESCRIPTION - PROGRESSION: CLINICAL_PROGRESSION: NOT CHANGED

## 2019-07-20 ASSESSMENT — PAIN DESCRIPTION - ORIENTATION: ORIENTATION: POSTERIOR

## 2019-07-20 ASSESSMENT — PAIN DESCRIPTION - ONSET: ONSET: ON-GOING

## 2019-07-20 ASSESSMENT — PAIN - FUNCTIONAL ASSESSMENT: PAIN_FUNCTIONAL_ASSESSMENT: ACTIVITIES ARE NOT PREVENTED

## 2019-07-20 ASSESSMENT — PAIN DESCRIPTION - LOCATION: LOCATION: NECK

## 2019-07-20 ASSESSMENT — PAIN DESCRIPTION - DESCRIPTORS: DESCRIPTORS: PRESSURE

## 2019-07-20 ASSESSMENT — PAIN DESCRIPTION - PAIN TYPE: TYPE: ACUTE PAIN

## 2019-07-20 ASSESSMENT — PAIN DESCRIPTION - FREQUENCY: FREQUENCY: CONTINUOUS

## 2019-07-20 NOTE — DISCHARGE SUMMARY
services and DME ordered as below. He will follow-up with PCP, Neurosurgery, Cardiology. Impairments:  RUE weakness and sensory deficit, balance, endurance, dysphagia    Medical Management:  Central cord syndrome - Due to hyperextension injury with type II dens fracture, C1 left posterior arch and laminar fractures. S/p occiput-C4 fusion (7/6 with Dr. Bessie Cesar). [place] J when OOB or HOB >30 degrees. PT/OT. Incision healing well with no evidence of infection. Pain controlled with acetaminophen and methocarbamol prn. He will f/u with Dr. Bessie Cesar on 7/22.      Possible right vertebral artery thrombus/dissection - . Repeat CTA as outpatient.      Dysphagia - Dietitian and SLP consults. MBS (7/19) -->Upgraded to DII + thins. Anticipate continue progress with home care SLP.      Pulmonary edema/fluid overload - s/p diuresis. Respiratory status stable and did not require any further diuresis during ARU stay. Wt down 191-->184 lbs.      NSTEMI - Significant troponin elevation at  without clinical symptoms. ASA, statin, bb. Follow-up with Cardiology scheduled.      HTN - Controlled on amlodipine, HCTZ, metoprolol. DC'ed valsartan.     DM2 - Controlled on metformin, used ISS while inpatient but required minimal coverage.      Proteus UTI - Sensitive to Cipro. Will continue for 7 more days at discharge. Leukocytosis - Persistent despite starting abx for UTI. Patient remains afebrile and reports resolution of urinary symptoms. Denies any other localizing symptoms. CXR obtained is negative for pneumonia. Incision shows now signs of infection. Patient educated on risks of discharging without clear explanation for elevated WBC. He verbalized his understanding and still requested to discharge as planned. He was educated on return precautions. He will f/u with Nsgy and PCP next week. Home care RN to repeat labs.      DVT PPx - heparin    Discharge Exam:  Const: Alert. No distress, pleasant.    HEENT: Left frontal

## 2020-08-10 ENCOUNTER — OFFICE VISIT (OUTPATIENT)
Dept: PRIMARY CARE CLINIC | Age: 85
End: 2020-08-10
Payer: MEDICARE

## 2020-08-10 PROCEDURE — 99211 OFF/OP EST MAY X REQ PHY/QHP: CPT | Performed by: NURSE PRACTITIONER

## 2020-08-10 PROCEDURE — G8421 BMI NOT CALCULATED: HCPCS | Performed by: NURSE PRACTITIONER

## 2020-08-10 PROCEDURE — G8428 CUR MEDS NOT DOCUMENT: HCPCS | Performed by: NURSE PRACTITIONER

## 2020-08-11 LAB — SARS-COV-2, NAA: NOT DETECTED

## 2021-12-24 ENCOUNTER — HOSPITAL ENCOUNTER (OUTPATIENT)
Age: 86
Setting detail: OBSERVATION
Discharge: HOME | End: 2021-12-25
Attending: EMERGENCY MEDICINE | Admitting: INTERNAL MEDICINE
Payer: MEDICARE

## 2021-12-24 ENCOUNTER — APPOINTMENT (OUTPATIENT)
Dept: GENERAL RADIOLOGY | Age: 86
End: 2021-12-24
Payer: MEDICARE
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
## 2021-12-24 DIAGNOSIS — I44.0 FIRST DEGREE AV BLOCK: ICD-10-CM

## 2021-12-24 DIAGNOSIS — S22.41XA CLOSED FRACTURE OF FOUR RIBS OF RIGHT SIDE, INITIAL ENCOUNTER: Primary | ICD-10-CM
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
## 2021-12-24 LAB
ANION GAP SERPL CALCULATED.3IONS-SCNC: 13 MMOL/L (ref 3–16)
BASOPHILS ABSOLUTE: 0.1 K/UL (ref 0–0.2)
BASOPHILS RELATIVE PERCENT: 0.6 %
BUN BLDV-MCNC: 26 MG/DL (ref 7–20)
CALCIUM SERPL-MCNC: 8.8 MG/DL (ref 8.3–10.6)
CHLORIDE BLD-SCNC: 98 MMOL/L (ref 99–110)
CO2: 27 MMOL/L (ref 21–32)
CREAT SERPL-MCNC: 1.2 MG/DL (ref 0.8–1.3)
EOSINOPHILS ABSOLUTE: 0.4 K/UL (ref 0–0.6)
EOSINOPHILS RELATIVE PERCENT: 3 %
GFR AFRICAN AMERICAN: >60
GFR NON-AFRICAN AMERICAN: 57
GLUCOSE BLD-MCNC: 225 MG/DL (ref 70–99)
GLUCOSE BLD-MCNC: 289 MG/DL (ref 70–99)
HCT VFR BLD CALC: 45.9 % (ref 40.5–52.5)
HEMOGLOBIN: 15.8 G/DL (ref 13.5–17.5)
LYMPHOCYTES ABSOLUTE: 1.4 K/UL (ref 1–5.1)
LYMPHOCYTES RELATIVE PERCENT: 10.5 %
MCH RBC QN AUTO: 31.4 PG (ref 26–34)
MCHC RBC AUTO-ENTMCNC: 34.3 G/DL (ref 31–36)
MCV RBC AUTO: 91.6 FL (ref 80–100)
MONOCYTES ABSOLUTE: 0.8 K/UL (ref 0–1.3)
MONOCYTES RELATIVE PERCENT: 6.2 %
NEUTROPHILS ABSOLUTE: 10.6 K/UL (ref 1.7–7.7)
NEUTROPHILS RELATIVE PERCENT: 79.7 %
PDW BLD-RTO: 12.9 % (ref 12.4–15.4)
PERFORMED ON: ABNORMAL
PLATELET # BLD: 204 K/UL (ref 135–450)
PMV BLD AUTO: 8.3 FL (ref 5–10.5)
POTASSIUM REFLEX MAGNESIUM: 4.2 MMOL/L (ref 3.5–5.1)
PRO-BNP: 307 PG/ML (ref 0–449)
RBC # BLD: 5.01 M/UL (ref 4.2–5.9)
SODIUM BLD-SCNC: 138 MMOL/L (ref 136–145)
TROPONIN: <0.01 NG/ML
TROPONIN: <0.01 NG/ML
WBC # BLD: 13.3 K/UL (ref 4–11)

## 2021-12-24 PROCEDURE — 6370000000 HC RX 637 (ALT 250 FOR IP): Performed by: INTERNAL MEDICINE

## 2021-12-24 PROCEDURE — 83036 HEMOGLOBIN GLYCOSYLATED A1C: CPT

## 2021-12-24 PROCEDURE — 2580000003 HC RX 258: Performed by: INTERNAL MEDICINE

## 2021-12-24 PROCEDURE — G0378 HOSPITAL OBSERVATION PER HR: HCPCS

## 2021-12-24 PROCEDURE — 6360000002 HC RX W HCPCS: Performed by: EMERGENCY MEDICINE

## 2021-12-24 PROCEDURE — 84484 ASSAY OF TROPONIN QUANT: CPT

## 2021-12-24 PROCEDURE — 71046 X-RAY EXAM CHEST 2 VIEWS: CPT

## 2021-12-24 PROCEDURE — 99283 EMERGENCY DEPT VISIT LOW MDM: CPT

## 2021-12-24 PROCEDURE — 85025 COMPLETE CBC W/AUTO DIFF WBC: CPT

## 2021-12-24 PROCEDURE — 80048 BASIC METABOLIC PNL TOTAL CA: CPT

## 2021-12-24 PROCEDURE — 96374 THER/PROPH/DIAG INJ IV PUSH: CPT

## 2021-12-24 PROCEDURE — 93005 ELECTROCARDIOGRAM TRACING: CPT | Performed by: EMERGENCY MEDICINE

## 2021-12-24 PROCEDURE — 83880 ASSAY OF NATRIURETIC PEPTIDE: CPT

## 2021-12-24 PROCEDURE — 36415 COLL VENOUS BLD VENIPUNCTURE: CPT

## 2021-12-24 RX ORDER — PROMETHAZINE HYDROCHLORIDE 25 MG/1
12.5 TABLET ORAL EVERY 6 HOURS PRN
Status: DISCONTINUED | OUTPATIENT
Start: 2021-12-24 | End: 2021-12-25 | Stop reason: HOSPADM

## 2021-12-24 RX ORDER — ACETAMINOPHEN 325 MG/1
650 TABLET ORAL EVERY 6 HOURS PRN
Status: DISCONTINUED | OUTPATIENT
Start: 2021-12-24 | End: 2021-12-25 | Stop reason: HOSPADM

## 2021-12-24 RX ORDER — POTASSIUM CHLORIDE 7.45 MG/ML
10 INJECTION INTRAVENOUS PRN
Status: DISCONTINUED | OUTPATIENT
Start: 2021-12-24 | End: 2021-12-25 | Stop reason: HOSPADM

## 2021-12-24 RX ORDER — POTASSIUM CHLORIDE 20 MEQ/1
40 TABLET, EXTENDED RELEASE ORAL PRN
Status: DISCONTINUED | OUTPATIENT
Start: 2021-12-24 | End: 2021-12-25 | Stop reason: HOSPADM

## 2021-12-24 RX ORDER — METOPROLOL SUCCINATE 50 MG/1
50 TABLET, EXTENDED RELEASE ORAL DAILY
Status: DISCONTINUED | OUTPATIENT
Start: 2021-12-25 | End: 2021-12-25 | Stop reason: HOSPADM

## 2021-12-24 RX ORDER — HYDROCHLOROTHIAZIDE 25 MG/1
25 TABLET ORAL DAILY
Status: DISCONTINUED | OUTPATIENT
Start: 2021-12-25 | End: 2021-12-25 | Stop reason: HOSPADM

## 2021-12-24 RX ORDER — PREDNISONE 1 MG/1
5 TABLET ORAL DAILY
Status: DISCONTINUED | OUTPATIENT
Start: 2021-12-25 | End: 2021-12-25 | Stop reason: HOSPADM

## 2021-12-24 RX ORDER — SODIUM CHLORIDE 9 MG/ML
25 INJECTION, SOLUTION INTRAVENOUS PRN
Status: DISCONTINUED | OUTPATIENT
Start: 2021-12-24 | End: 2021-12-25 | Stop reason: HOSPADM

## 2021-12-24 RX ORDER — PREDNISONE 1 MG/1
1 TABLET ORAL DAILY
COMMUNITY

## 2021-12-24 RX ORDER — DEXTROSE MONOHYDRATE 25 G/50ML
12.5 INJECTION, SOLUTION INTRAVENOUS PRN
Status: DISCONTINUED | OUTPATIENT
Start: 2021-12-24 | End: 2021-12-25 | Stop reason: HOSPADM

## 2021-12-24 RX ORDER — MAGNESIUM SULFATE IN WATER 40 MG/ML
2000 INJECTION, SOLUTION INTRAVENOUS PRN
Status: DISCONTINUED | OUTPATIENT
Start: 2021-12-24 | End: 2021-12-25 | Stop reason: HOSPADM

## 2021-12-24 RX ORDER — CLOPIDOGREL BISULFATE 75 MG/1
75 TABLET ORAL DAILY
Status: DISCONTINUED | OUTPATIENT
Start: 2021-12-25 | End: 2021-12-25 | Stop reason: HOSPADM

## 2021-12-24 RX ORDER — CANDESARTAN 32 MG/1
32 TABLET ORAL NIGHTLY
COMMUNITY

## 2021-12-24 RX ORDER — ACETAMINOPHEN 500 MG
1000 TABLET ORAL ONCE
Status: DISCONTINUED | OUTPATIENT
Start: 2021-12-24 | End: 2021-12-24

## 2021-12-24 RX ORDER — VALSARTAN 40 MG/1
40 TABLET ORAL DAILY
Status: DISCONTINUED | OUTPATIENT
Start: 2021-12-25 | End: 2021-12-25 | Stop reason: HOSPADM

## 2021-12-24 RX ORDER — NICOTINE POLACRILEX 4 MG
15 LOZENGE BUCCAL PRN
Status: DISCONTINUED | OUTPATIENT
Start: 2021-12-24 | End: 2021-12-25 | Stop reason: HOSPADM

## 2021-12-24 RX ORDER — ALBUTEROL SULFATE 2.5 MG/3ML
2.5 SOLUTION RESPIRATORY (INHALATION) 2 TIMES DAILY PRN
Status: DISCONTINUED | OUTPATIENT
Start: 2021-12-24 | End: 2021-12-25 | Stop reason: HOSPADM

## 2021-12-24 RX ORDER — HYDROCODONE BITARTRATE AND ACETAMINOPHEN 5; 325 MG/1; MG/1
1 TABLET ORAL EVERY 6 HOURS PRN
Status: DISCONTINUED | OUTPATIENT
Start: 2021-12-24 | End: 2021-12-25 | Stop reason: HOSPADM

## 2021-12-24 RX ORDER — CITALOPRAM 20 MG/1
20 TABLET ORAL DAILY
Status: DISCONTINUED | OUTPATIENT
Start: 2021-12-25 | End: 2021-12-25 | Stop reason: HOSPADM

## 2021-12-24 RX ORDER — ONDANSETRON 2 MG/ML
4 INJECTION INTRAMUSCULAR; INTRAVENOUS EVERY 6 HOURS PRN
Status: DISCONTINUED | OUTPATIENT
Start: 2021-12-24 | End: 2021-12-25 | Stop reason: HOSPADM

## 2021-12-24 RX ORDER — ACETAMINOPHEN 650 MG/1
650 SUPPOSITORY RECTAL EVERY 6 HOURS PRN
Status: DISCONTINUED | OUTPATIENT
Start: 2021-12-24 | End: 2021-12-25 | Stop reason: HOSPADM

## 2021-12-24 RX ORDER — ATORVASTATIN CALCIUM 20 MG/1
20 TABLET, FILM COATED ORAL NIGHTLY
Status: DISCONTINUED | OUTPATIENT
Start: 2021-12-24 | End: 2021-12-25 | Stop reason: HOSPADM

## 2021-12-24 RX ORDER — DEXTROSE MONOHYDRATE 50 MG/ML
100 INJECTION, SOLUTION INTRAVENOUS PRN
Status: DISCONTINUED | OUTPATIENT
Start: 2021-12-24 | End: 2021-12-25 | Stop reason: HOSPADM

## 2021-12-24 RX ORDER — POTASSIUM CHLORIDE 7.45 MG/ML
10 INJECTION INTRAVENOUS PRN
Status: DISCONTINUED | OUTPATIENT
Start: 2021-12-24 | End: 2021-12-24 | Stop reason: SDUPTHER

## 2021-12-24 RX ORDER — ACETAMINOPHEN AND CODEINE PHOSPHATE 300; 30 MG/1; MG/1
2 TABLET ORAL ONCE
Status: DISCONTINUED | OUTPATIENT
Start: 2021-12-24 | End: 2021-12-25 | Stop reason: HOSPADM

## 2021-12-24 RX ORDER — SODIUM CHLORIDE 0.9 % (FLUSH) 0.9 %
10 SYRINGE (ML) INJECTION EVERY 12 HOURS SCHEDULED
Status: DISCONTINUED | OUTPATIENT
Start: 2021-12-24 | End: 2021-12-25 | Stop reason: HOSPADM

## 2021-12-24 RX ORDER — CLOPIDOGREL BISULFATE 75 MG/1
1 TABLET ORAL DAILY
Status: ON HOLD | COMMUNITY
Start: 2021-11-02 | End: 2022-10-29 | Stop reason: HOSPADM

## 2021-12-24 RX ORDER — MORPHINE SULFATE 2 MG/ML
1 INJECTION, SOLUTION INTRAMUSCULAR; INTRAVENOUS
Status: DISCONTINUED | OUTPATIENT
Start: 2021-12-24 | End: 2021-12-25 | Stop reason: HOSPADM

## 2021-12-24 RX ORDER — FENTANYL CITRATE 50 UG/ML
50 INJECTION, SOLUTION INTRAMUSCULAR; INTRAVENOUS ONCE
Status: COMPLETED | OUTPATIENT
Start: 2021-12-24 | End: 2021-12-24

## 2021-12-24 RX ORDER — AMLODIPINE BESYLATE 10 MG/1
10 TABLET ORAL NIGHTLY
Status: DISCONTINUED | OUTPATIENT
Start: 2021-12-24 | End: 2021-12-25 | Stop reason: HOSPADM

## 2021-12-24 RX ORDER — LANOLIN ALCOHOL/MO/W.PET/CERES
3 CREAM (GRAM) TOPICAL NIGHTLY PRN
Status: DISCONTINUED | OUTPATIENT
Start: 2021-12-24 | End: 2021-12-25 | Stop reason: HOSPADM

## 2021-12-24 RX ORDER — SODIUM CHLORIDE 0.9 % (FLUSH) 0.9 %
10 SYRINGE (ML) INJECTION PRN
Status: DISCONTINUED | OUTPATIENT
Start: 2021-12-24 | End: 2021-12-25 | Stop reason: HOSPADM

## 2021-12-24 RX ADMIN — HYDROCODONE BITARTRATE AND ACETAMINOPHEN 1 TABLET: 5; 325 TABLET ORAL at 22:06

## 2021-12-24 RX ADMIN — FENTANYL CITRATE 50 MCG: 50 INJECTION INTRAMUSCULAR; INTRAVENOUS at 17:11

## 2021-12-24 RX ADMIN — INSULIN LISPRO 3 UNITS: 100 INJECTION, SOLUTION INTRAVENOUS; SUBCUTANEOUS at 22:34

## 2021-12-24 RX ADMIN — ATORVASTATIN CALCIUM 20 MG: 20 TABLET, FILM COATED ORAL at 22:06

## 2021-12-24 RX ADMIN — AMLODIPINE BESYLATE 10 MG: 10 TABLET ORAL at 22:06

## 2021-12-24 RX ADMIN — SODIUM CHLORIDE, PRESERVATIVE FREE 10 ML: 5 INJECTION INTRAVENOUS at 21:00

## 2021-12-24 ASSESSMENT — PAIN SCALES - GENERAL
PAINLEVEL_OUTOF10: 6
PAINLEVEL_OUTOF10: 6
PAINLEVEL_OUTOF10: 7

## 2021-12-24 NOTE — ED PROVIDER NOTES
[address] [facility]      Pt Name: Reyes Medina  MRN: [id number]  [place] 12/24/1930  Date of evaluation: 12/24/2021  Provider: Mary Grace Segura, [address] [place] [address]  Chief Complaint   Patient presents with   Daneil Octavio     the patient was getting up and the chair slipped and he fell hitting his right ribs on a chair, pt on blood thinners 6/10       I wore personal protective equipment when I was in the room the entire time. This includes gloves, N95 mask, face shield, and a glove over my stethoscope for protection. HPI  Reyes Medina is a 80 y.o. male who presents with complaint of falling from a standing position. He reached out to lean on a chair and the chair slipped and went backwards and he fell forward hitting his right chest on the arm of the chair. Denies any other injuries. Denies any neck pain. Denies any headache. He denies being on blood thinners. He denies any back pain. He states it hurts to breathe or move on the right side of his chest.  He denies coughing up any blood. He states this occurred at 1:30 PM.  Patient refused Vicodin in the emergency department. He denies any ration of his pain. He denies being short of breath but states that it hurts to breathe.  ? REVIEW OF SYSTEMS  All systems negative except as noted in the HPI. Reviewed Nurses' notes and concur. No LMP for male patient. PAST MEDICAL HISTORY  Past Medical History:   Diagnosis Date    [facility]ylosing spondylitis ([facility] [address]     Basal cell carcinoma     Diabetes mellitus type II, controlled ([facility] [address]     Hyperlipidemia     Hypertension     Varicella        FAMILY HISTORY  Family History   Problem Relation Age of Onset    Stroke Father     Cancer Sister        SOCIAL HISTORY   reports that he has never smoked. He has never used smokeless tobacco. He reports previous alcohol use. He reports that he does not use drugs.     SURGICAL HISTORY  Past Surgical normal breath sounds, no respiratory distress, no wheezing, no rales, no rhonchi chest is mildly tender over the lateral anterior right ribs approximately 6-10. No deformity or crepitance is noted. There is no subcutaneous air. Breath sounds are equal bilaterally. Abdomen: Soft, no tender with no distension, no masses, no pulsatile masses, no hepatosplenomegaly, normal bowel sounds  Skin: Warm, Dry, No erythema, No rash. Back: no tenderness, normal range of motion, No scoliosis. Extremities:  neurovascular intact, no deformity, no swelling, no erythema, no lacerations noted, no amputations, no cyanosis, no mottling, no ecchymosis, no tenderness, capillary refill less than 2 seconds. Musculoskeletal: Good range of motion in all major joints except as noted above, No tenderness to palpation or major deformities except as noted above. Neurologic: Alert & oriented x 3, CN II through XII are intact, normal motor function, normal sensory function, no focal deficits noted.   Psychiatric: Affect normal, Judgment normal, Mood normal.    LABORATORY  Labs Reviewed   CBC WITH AUTO DIFFERENTIAL - Abnormal; Notable for the following components:       Result Value    WBC 13.3 (*)     Neutrophils Absolute 10.6 (*)     All other components within normal limits    Narrative:     Performed at:  [address] [place] [address]   Phone [phone]   BASIC METABOLIC PANEL W/ REFLEX TO MG FOR LOW K - Abnormal; Notable for the following components:    Chloride 98 (*)     Glucose 225 (*)     BUN 26 (*)     GFR Non- 57 (*)     All other components within normal limits    Narrative:     Performed at:  [facility]  [address] [facility] [place] [address]   Phone [phone]   TROPONIN    Narrative:     Performed at:  [address] [place] [address]   Phone [phone] BRAIN NATRIURETIC PEPTIDE    Narrative:     Performed at:  [facility]  [address] [facility] [place]   Phone [phone]       ? EKG  EKG Interpretation    Interpreted by emergency department physician  Time performed: 5566  Time read: 1425    Rhythm: Sinus  Ventricular Rate: 57  QRS Axis: -24  Ectopy: None  Conduction: Sinus bradycardia with first-degree AV block. There is LVH by voltage with early repolarization abnormalities. There is a nonspecific interventricular conduction delay. ST Segments: Consistent with early repolarization abnormalities  T Waves: Consistent with early repolarization abnormalities  Q Waves: None noted    Other findings: Motion artifact making it difficult to read EKG    Compared to EKG on: None to compare    Clinical Impression: Sinus bradycardia with first-degree AV block. There is LVH by voltage with early repolarization abnormalities. There is nonspecific interventricular conduction delay. There is motion artifact making it difficult to read EKG. There is no old EKG to compare. [place] [address], DO    RADIOLOGY/PROCEDURES  I personally reviewed the images for this case. XR CHEST (2 VW)   Preliminary Result   1. Acute fractures of the right anterolateral 7th through 10th ribs, without   evidence of a pneumothorax. 2. No acute cardiopulmonary disease. COURSE & MEDICAL DECISION MAKING  Pertinent Labs, EKG, & Imaging studies reviewed.  (See chart for details)    Vitals:    12/24/21 1436 12/24/21 1442   BP:  (!) 158/67   Pulse:  55   Resp:  16   Temp:  98.2 °F (36.8 °C)   TempSrc:  Oral   SpO2:  95%   Weight: 180 lb 11.2 oz (82 kg)    Height: 6' (1.829 m)        Medications   acetaminophen-codeine (TYLENOL #3) 300-30 MG per tablet 2 tablet (has no administration in time range)   fentaNYL (SUBLIMAZE) injection 50 mcg (50 mcg IntraVENous Given 12/24/21 1711)       New Prescriptions    No medications on file SEP-1 CORE MEASURE DATA  Exclusion criteria: the patient is NOT to be included for sepsis due to: Infection is not suspected    Patient remained stable in the ED. his EKG showed a first-degree AV block. However, his cardiac enzymes were negative. Patient states he has no history of first-degree AV block. I looked through his history [address] and his cardiologist history and there is no evidence of a first-degree AV block. I spoke to his cardiologist's partner, Dr. Wannetta Litten, who stated that if this is new, he is concerned due to the length of the first-degree AV block. Therefore, patient was admitted to the hospital for his rib fractures pain control and further evaluation treatment of his first-degree AV block. The patient's blood pressure was found to be elevated according to CMS/Medicare and the Affordable Care Act/ObamaCare criteria. Elevated blood pressure could occur because of pain or anxiety or other reasons and does not mean that they need to have their blood pressure treated or medications otherwise adjusted. However, this could also be a sign that they will need to have their blood pressure treated or medications changed. The patient was instructed to follow up closely with their personal physician to have their blood pressure rechecked. The patient was instructed to take a list of recent blood pressure readings to their next visit with their personal physician. I reviewed old records I cannot find anywhere in his records where he had a first-degree AV block. I checked care everywhere and there was no record of any EKGs performed or anywhere in his previous medical history by the cardiologist or family doctor or internal medicine that he had a first-degree AV block. Therefore, I spoke to his cardiologist's partner, Dr. Wannetta Litten. (This chart has been completed using [address].  Although attempts have been made to ensure accuracy, words and/or phrases may not be transcribed as intended.)    Patient refused pain medicines at the time of their exam.    IMPRESSION(S):  1. Closed fracture of four ribs of right side, initial encounter    2. First degree AV block    3. Fall, initial encounter        ?   Recheck Times: 1700, 1800  Critical Care Time: 35 minutes not including billable procedures    Diagnostic considerations include but are not limited to:  fracture or dislocation, visceral injury, intracranial bleed, spinal cord injury, pelvic injury, head injury, blunt chest trauma, blunt abdominal trauma, laceration, abrasions, contusions, other  fracture or dislocation, visceral injury, intracranial bleed, spinal cord injury, pelvic injury, head injury, blunt chest trauma, blunt abdominal trauma, laceration, abrasions, contusions, other           Bailey Villarreal,   12/24/21 4854

## 2021-12-24 NOTE — ED NOTES
Bed: E-41  Expected date:   Expected time:   Means of arrival:   Comments:  S[address] fall     Daljit Camp RN  12/24/21 2733

## 2021-12-25 VITALS
SYSTOLIC BLOOD PRESSURE: 142 MMHG | HEART RATE: 57 BPM | HEIGHT: 72 IN | OXYGEN SATURATION: 93 % | BODY MASS INDEX: 24.49 KG/M2 | DIASTOLIC BLOOD PRESSURE: 64 MMHG | RESPIRATION RATE: 15 BRPM | TEMPERATURE: 97.9 F | WEIGHT: 180.78 LBS

## 2021-12-25 PROBLEM — I44.0 1ST DEGREE AV BLOCK: Status: ACTIVE | Noted: 2021-12-25

## 2021-12-25 LAB
ANION GAP SERPL CALCULATED.3IONS-SCNC: 10 MMOL/L (ref 3–16)
BUN BLDV-MCNC: 23 MG/DL (ref 7–20)
CALCIUM SERPL-MCNC: 8.7 MG/DL (ref 8.3–10.6)
CHLORIDE BLD-SCNC: 99 MMOL/L (ref 99–110)
CO2: 28 MMOL/L (ref 21–32)
CREAT SERPL-MCNC: 1.2 MG/DL (ref 0.8–1.3)
ESTIMATED AVERAGE GLUCOSE: 188.6 MG/DL
GFR AFRICAN AMERICAN: >60
GFR NON-AFRICAN AMERICAN: 57
GLUCOSE BLD-MCNC: 157 MG/DL (ref 70–99)
GLUCOSE BLD-MCNC: 207 MG/DL (ref 70–99)
GLUCOSE BLD-MCNC: 218 MG/DL (ref 70–99)
HBA1C MFR BLD: 8.2 %
HCT VFR BLD CALC: 41.7 % (ref 40.5–52.5)
HEMOGLOBIN: 14.4 G/DL (ref 13.5–17.5)
MCH RBC QN AUTO: 31.7 PG (ref 26–34)
MCHC RBC AUTO-ENTMCNC: 34.7 G/DL (ref 31–36)
MCV RBC AUTO: 91.5 FL (ref 80–100)
PDW BLD-RTO: 12.7 % (ref 12.4–15.4)
PERFORMED ON: ABNORMAL
PERFORMED ON: ABNORMAL
PLATELET # BLD: 187 K/UL (ref 135–450)
PMV BLD AUTO: 8.3 FL (ref 5–10.5)
POTASSIUM REFLEX MAGNESIUM: 4.2 MMOL/L (ref 3.5–5.1)
RBC # BLD: 4.55 M/UL (ref 4.2–5.9)
SODIUM BLD-SCNC: 137 MMOL/L (ref 136–145)
TROPONIN: <0.01 NG/ML
WBC # BLD: 10.3 K/UL (ref 4–11)

## 2021-12-25 PROCEDURE — 97162 PT EVAL MOD COMPLEX 30 MIN: CPT

## 2021-12-25 PROCEDURE — 36415 COLL VENOUS BLD VENIPUNCTURE: CPT

## 2021-12-25 PROCEDURE — 94761 N-INVAS EAR/PLS OXIMETRY MLT: CPT

## 2021-12-25 PROCEDURE — 84484 ASSAY OF TROPONIN QUANT: CPT

## 2021-12-25 PROCEDURE — 80048 BASIC METABOLIC PNL TOTAL CA: CPT

## 2021-12-25 PROCEDURE — 99203 OFFICE O/P NEW LOW 30 MIN: CPT | Performed by: INTERNAL MEDICINE

## 2021-12-25 PROCEDURE — 2580000003 HC RX 258: Performed by: INTERNAL MEDICINE

## 2021-12-25 PROCEDURE — 85027 COMPLETE CBC AUTOMATED: CPT

## 2021-12-25 PROCEDURE — 97530 THERAPEUTIC ACTIVITIES: CPT

## 2021-12-25 PROCEDURE — 96372 THER/PROPH/DIAG INJ SC/IM: CPT

## 2021-12-25 PROCEDURE — 6360000002 HC RX W HCPCS: Performed by: INTERNAL MEDICINE

## 2021-12-25 PROCEDURE — G0378 HOSPITAL OBSERVATION PER HR: HCPCS

## 2021-12-25 PROCEDURE — 6370000000 HC RX 637 (ALT 250 FOR IP): Performed by: INTERNAL MEDICINE

## 2021-12-25 RX ORDER — HYDROCODONE BITARTRATE AND ACETAMINOPHEN 5; 325 MG/1; MG/1
1 TABLET ORAL EVERY 6 HOURS PRN
Qty: 12 TABLET | Refills: 0 | Status: SHIPPED | OUTPATIENT
Start: 2021-12-25 | End: 2021-12-28

## 2021-12-25 RX ORDER — HYDROCODONE BITARTRATE AND ACETAMINOPHEN 5; 325 MG/1; MG/1
1 TABLET ORAL EVERY 6 HOURS PRN
Qty: 12 TABLET | Refills: 0 | Status: SHIPPED | OUTPATIENT
Start: 2021-12-25 | End: 2021-12-25

## 2021-12-25 RX ADMIN — VALSARTAN 40 MG: 40 TABLET, FILM COATED ORAL at 09:20

## 2021-12-25 RX ADMIN — HYDROCHLOROTHIAZIDE 25 MG: 25 TABLET ORAL at 09:20

## 2021-12-25 RX ADMIN — INSULIN LISPRO 2 UNITS: 100 INJECTION, SOLUTION INTRAVENOUS; SUBCUTANEOUS at 09:21

## 2021-12-25 RX ADMIN — CLOPIDOGREL BISULFATE 75 MG: 75 TABLET ORAL at 09:20

## 2021-12-25 RX ADMIN — CITALOPRAM HYDROBROMIDE 20 MG: 20 TABLET ORAL at 09:20

## 2021-12-25 RX ADMIN — SODIUM CHLORIDE, PRESERVATIVE FREE 10 ML: 5 INJECTION INTRAVENOUS at 10:24

## 2021-12-25 RX ADMIN — ENOXAPARIN SODIUM 40 MG: 100 INJECTION SUBCUTANEOUS at 09:21

## 2021-12-25 RX ADMIN — HYDROCODONE BITARTRATE AND ACETAMINOPHEN 1 TABLET: 5; 325 TABLET ORAL at 06:07

## 2021-12-25 RX ADMIN — PREDNISONE 5 MG: 5 TABLET ORAL at 09:20

## 2021-12-25 RX ADMIN — HYDROCODONE BITARTRATE AND ACETAMINOPHEN 1 TABLET: 5; 325 TABLET ORAL at 13:20

## 2021-12-25 RX ADMIN — INSULIN LISPRO 4 UNITS: 100 INJECTION, SOLUTION INTRAVENOUS; SUBCUTANEOUS at 12:50

## 2021-12-25 ASSESSMENT — PAIN SCALES - GENERAL
PAINLEVEL_OUTOF10: 7
PAINLEVEL_OUTOF10: 7

## 2021-12-25 NOTE — DISCHARGE SUMMARY
sounds. Musculoskeletal:  No clubbing, cyanosis or edema bilaterally. Full range of motion without deformity. Skin: Skin color, texture, turgor normal.  No rashes or lesions. Neurologic:  Neurovascularly intact without any focal sensory/motor deficits. Cranial nerves: II-XII intact, grossly non-focal.  Psychiatric:  Alert and oriented, thought content appropriate, normal insight  Capillary Refill: Brisk,< 3 seconds   Peripheral Pulses: +2 palpable, equal bilaterally       Labs: For convenience and continuity at follow-up the following most recent labs are provided:      CBC:    Lab Results   Component Value Date    WBC 10.3 12/25/2021    HGB 14.4 12/25/2021    HCT 41.7 12/25/2021     12/25/2021       Renal:    Lab Results   Component Value Date     12/25/2021    K 4.2 12/25/2021    CL 99 12/25/2021    CO2 28 12/25/2021    BUN 23 12/25/2021    CREATININE 1.2 12/25/2021    CALCIUM 8.7 12/25/2021    PHOS 3.3 10/15/2021         Significant Diagnostic Studies    Radiology:   XR CHEST (2 VW)   Preliminary Result   1. Acute fractures of the right anterolateral 7th through 10th ribs, without   evidence of a pneumothorax. 2. No acute cardiopulmonary disease. Consults:     IP CONSULT TO CARDIOLOGY  IP CONSULT TO CARDIOLOGY  IP CONSULT TO PHARMACY    Disposition:  Home. Condition at Discharge: Stable    Discharge Instructions/Follow-up:    -Patient was prescribed 3 days of Norco and advised that his risk of fall is increased and should be supervised with ambulation.  -Incentive spirometry as an outpatient    Code Status:  Full Code     Activity: activity as tolerated    Diet: regular diet      Discharge Medications:     Current Discharge Medication List           Details   HYDROcodone-acetaminophen (NORCO) 5-325 MG per tablet Take 1 tablet by mouth every 6 hours as needed for Pain for up to 3 days.   Qty: 12 tablet, Refills: 0    Comments: Reduce doses taken as pain becomes manageable  Associated Diagnoses: Closed fracture of four ribs of right side, initial encounter              Details   clopidogrel (PLAVIX) 75 MG tablet Take 1 tablet by mouth daily      candesartan (ATACAND) 32 MG tablet Take 32 mg by mouth nightly      predniSONE (DELTASONE) 5 MG tablet Take 1 tablet by mouth daily For shoulder pain      metFORMIN, OSM, (FORTAMET) 1000 MG extended release tablet Take 1,000 mg by mouth daily (with breakfast)      metoprolol succinate (TOPROL XL) 50 MG extended release tablet Take 50 mg by mouth daily       amLODIPine (NORVASC) 10 MG tablet Take 10 mg by mouth nightly       hydrochlorothiazide (HYDRODIURIL) 25 MG tablet Take 25 mg by mouth daily      citalopram (CELEXA) 20 MG tablet Take 20 mg by mouth daily      simvastatin (ZOCOR) 80 MG tablet Take 80 mg by mouth nightly      melatonin 3 MG TABS tablet Take 3 mg by mouth nightly as needed      albuterol (PROVENTIL) (2.5 MG/3ML) 0.083% nebulizer solution Take 2.5 mg by nebulization 2 times daily as needed for Wheezing             Time Spent on discharge is more than 30 minutes in the examination, evaluation, counseling and review of medications and discharge plan. Signed:    Pearlene Lesch, MD   12/25/2021      Thank you Agnes Winslow MD for the opportunity to be involved in this patient's care. If you have any questions or concerns please feel free to contact me at [phone].

## 2021-12-25 NOTE — PROGRESS NOTES
Medication Reconciliation     List of medications patient is currently taking is complete. Source of information:   1. Conversation with patient at bedside  2. EPIC records        Notes regarding home medications:  1. Patient received all of his AM home medications today (12/24) PTA. 2. Patient is on antiplatelet therapy, Clopidogrel 75mg QD for CAD. 3. Removed ASA and Senokot-S  4. Added Plavix, Atacand, and Deltasone to med list.     Denies any other OTC/herbal medications.      Ronnie Weston, Pharmacy Intern

## 2021-12-25 NOTE — CONSULTS
History and Physical  [facility]   Cardiology    Chief Complaint: fall and 1st degree av block    HPI:     Patient is a 80 y.o. male presents with a clear mechanical fall by hx. His hand slipped off a chair and he hit his ribs on the chair arm, right side. He denies any cardiac sx. He  Has a hx of stents on plavix but no recent cardiac sx. He follows at [facility]. Dr. Jeison Leach decided to admit the patient based on his hx, concern over ekg. The 1st degree av block is not an indication for admission with the caveat that one knew it was normal the day before without an explanation. As it turns out the report of ekgs from  reveal a AL interval of 290-300 ms 2019 thus no change. The patient's troponins are negative. His ekg has no ST changes.   Ventricular Rate:  61  BPM   Atrial Rate:  61  BPM   P-R Interval:  296  ms   QRS Duration:  86  ms   QT:  490  ms   QTc:  493  ms   P Axis:  75  degrees   R Axis:  -11  degrees   T Axis:  32  degrees   Diagnosis Line:  SINUS RHYTHM WITH 1ST DEGREE A-V BLOCK ^ PROLONGED QT ^ ABNORMAL ECG ^ No previous ECGs available ^ Confirmed by Jose Antonio Wolf MD, Matheus Bowden [phone]) on 7/5/2019 1:02:40 PM      Specimen Collected: 09/26/19  4:52 AM Last Resulted: 09/26/19 10:31 AM       Past Medical History:   Diagnosis Date    [facility] ([facility] [address]     Basal cell carcinoma     Diabetes mellitus type II, controlled ([facility] [address]     Hyperlipidemia     Hypertension     Varicella       Past Surgical History:   Procedure Laterality Date    CERVICAL FUSION      TURP          Medications Prior to Admission: clopidogrel (PLAVIX) 75 MG tablet, Take 1 tablet by mouth daily  candesartan (ATACAND) 32 MG tablet, Take 32 mg by mouth nightly  predniSONE (DELTASONE) 5 MG tablet, Take 1 tablet by mouth daily For shoulder pain  metFORMIN, OSM, (FORTAMET) 1000 MG extended release tablet, Take 1,000 mg by mouth daily (with breakfast)  metoprolol succinate (TOPROL XL) 50 MG extended release tablet, Take 50 mg by mouth daily   amLODIPine (NORVASC) 10 MG tablet, Take 10 mg by mouth nightly   hydrochlorothiazide (HYDRODIURIL) 25 MG tablet, Take 25 mg by mouth daily  citalopram (CELEXA) 20 MG tablet, Take 20 mg by mouth daily  simvastatin (ZOCOR) 80 MG tablet, Take 80 mg by mouth nightly  melatonin 3 MG TABS tablet, Take 3 mg by mouth nightly as needed  albuterol (PROVENTIL) (2.5 MG/3ML) 0.083% nebulizer solution, Take 2.5 mg by nebulization 2 times daily as needed for Wheezing    Allergies   Allergen Reactions    Penicillins Hives      Social History     Tobacco Use    Smoking status: Never Smoker    Smokeless tobacco: Never Used   Substance Use Topics    Alcohol use: Not Currently      Family History   Problem Relation Age of Onset    Stroke Father     Cancer Sister         Review of Systems:  · Constitutional: No Fever or Weight Loss  · Eyes: No decreased vision  · ENT: No Headaches, Hearing Loss or Vertigo  · Cardiovascular: right rib chest pain  · Respiratory: No cough or wheezing    · Gastrointestinal: No abdominal pain, appetite loss, blood in stools, constipation, diarrhea or heartburn  · Genitourinary: No dysuria, trouble voiding, or hematuria  · Musculoskeletal:  No gait disturbance, weakness or joint complaints  · Integumentary: No rash or pruritis  · Neurological: No TIA or stroke symptoms  · Psychiatric: No anxiety or depression  · Endocrine: No malaise, fatigue or temperature intolerance  · Hematologic/Lymphatic: No bleeding problems, blood clots or swollen lymph nodes  · Allergic/Immunologic: No nasal congestion or hives      Objective Data:     BP (!) 142/64   Pulse 57   Temp 97.9 °F (36.6 °C) (Oral)   Resp 15   Ht 6' (1.829 m)   Wt 180 lb 12.4 oz (82 kg)   SpO2 91%   BMI 24.52 kg/m²     General appearance: alert, appears stated age and cooperative  Alert, awake, oriented x 3  Eyes:  No erythema  Head: atraumatic  Neck: no JVD  Lungs: clear to auscultation bilaterally  Heart: regular rate and rhythm, S1, S2 normal, no murmur, click, rub or gallop  Abdomen: soft, non-tender; bowel sounds normal; no masses,  no organomegaly  Extremities: extremities normal, atraumatic, no cyanosis or edema  Skin: Skin color, texture, turgor normal. No rashes or lesions  Hematologic: no remarkable bruising   Right lower chest wall pain  ECG: normal sinus rhythm and 1st degree heart block     Data Review    1. Acute fractures of the right anterolateral 7th through 10th ribs, without   evidence of a pneumothorax. 2. No acute cardiopulmonary disease.             Recent Labs     12/24/21  1637 12/25/21  0433    137   K 4.2 4.2   CL 98* 99   CO2 27 28   BUN 26* 23*   CREATININE 1.2 1.2     Recent Labs     12/24/21  1637 12/25/21  0433   WBC 13.3* 10.3   HGB 15.8 14.4   HCT 45.9 41.7   MCV 91.6 91.5    187     Lab Results   Component Value Date    CKTOTAL 106 02/06/2012    TROPONINI <0.01 12/25/2021         Assessment:     Active Problems:    Fall against object    1st degree AV block  Resolved Problems:    * No resolved hospital problems. *      Plan:     1. The records show the patient has stable 1st degree av block. His history is not compatible with a cardiac presentation. 2. Reviewed in detail with patient. [id number] [address]ia echols from cardiac standpoint. He follows at [facility] for his cardiology care. Please call if questions.

## 2021-12-25 NOTE — PROGRESS NOTES
Pt discharged to home. Transportation here with wheelchair. Accompanied by family. Transported in personal vehicle. Discharge instructions, Rx, and personal belongings given to pt. Explanation of discharge medications and instructions understood by verbal statement. No questions, comments or concerns at this time.        Electronically signed by Jeremias Jewell RN on 12/25/2021 at 2:22 PM

## 2021-12-25 NOTE — PROGRESS NOTES
Patient called this RN after discharge and stated PRN [place] prescription was sent to pharmacy that was not open on [address]. Patient requested medication to be sent to [facility] on [facility]. Dr. Zakiya Mason perfectserved to request mediction to be sent to correct pharmacy.    Electronically signed by Natalie Holliday RN on 12/25/2021 at 3:08 PM

## 2021-12-25 NOTE — PLAN OF CARE
Problem: Falls - Risk of:  Goal: Will remain free from falls  Description: Will remain free from falls  Outcome: Ongoing  Note: Fall risk assessment completed. Fall precautions in place. Call light within reach. Pt educated on calling for assistance before getting up. Walkway free of clutter. Will continue to monitor. Goal: Absence of physical injury  Description: Absence of physical injury  Outcome: Ongoing  Note: Fall risk assessment completed. Fall precautions in place. Call light within reach. Pt educated on calling for assistance before getting up. Walkway free of clutter. Will continue to monitor.

## 2021-12-25 NOTE — PROGRESS NOTES
Patient called and made aware medication sent to correct pharmacy.      Electronically signed by Korina Duarte RN on 12/25/2021 at 3:10 PM

## 2021-12-25 NOTE — PROGRESS NOTES
Physical Therapy  Facility/Department: [facility] 3 ORTHOPEDICS  Initial Assessment and DISCHARGE SUMMARY     NAME: Anthony Rodriguez  : 1930  MRN: [id number]    Date of Service: 2021  Discharge Recommendations:  24 hour supervision or assist   PT Equipment Recommendations  Equipment Needed: No    Assessment   Assessment: Pt presents in OBS status for \"Fall, Multiple Rib fractures, AV block\". Prior to admit, pt reported being fully Independent, living at home with spouse (whom he cares for/assists as needed). Today, pt demo'd functional mobility transfers, and ambulation without need for devices/equipment, and only Supervised/SBA. Pt is anticipating d/c to Home with spouse, Becky Armenta later this date. Encouraged gradual return to activity as tolerated at home, with no further PT needs identified at this time. Pt agrees with POC. Anthony Rodriguez scored a 20/24 on the AM-PAC short mobility form. At this time, no further PT is recommended at this time. Recommend patient returns to prior setting with prior services. Prognosis: Good  Decision Making: Medium Complexity  History: as noted  Exam: as above  Clinical Presentation: evolving  PT Education: Goals; General Safety;PT Role;Disease Specific Education; Functional Mobility Training;Plan of Care;Home Exercise Program;Adaptive Device Training  Barriers to Learning: none  No Skilled PT: Safe to return home  REQUIRES PT FOLLOW UP: No  Activity Tolerance  Activity Tolerance: Patient Tolerated treatment well       Patient Diagnosis(es): The primary encounter diagnosis was Closed fracture of four ribs of right side, initial encounter. Diagnoses of First degree AV block and Fall, initial encounter were also pertinent to this visit. has a past medical history of Ankylosing spondylitis ([facility] [address] Basal cell carcinoma, Diabetes mellitus type II, controlled ([facility] [address] Hyperlipidemia, Hypertension, and Varicella.    has a past surgical history that includes TURP and cervical fusion. Restrictions  Restrictions/Precautions  Restrictions/Precautions: Fall Risk  Vision/Hearing  Vision: Within Functional Limits  Hearing: Within functional limits     Subjective  General  Chart Reviewed: Yes  Additional Pertinent Hx: Per H&P, \"Patient is a 80-year-old male with past medical history of diabetes mellitus hypertension hyperlipidemia who presented to hospital after a fall at home. According to patient he did not feel dizzy or lightheaded, he just had a mechanical fall due to poor judgment. Otherwise he also mentions he has chest pain to the L side, 10/10, ass with shallow breaths and he broke his right ribs. On EKG he was noted to have prolonged AV block, cardiology was consulted from the emergency department and recommended to admit patient for cardiac telemetry monitoring. Otherwise denied lightheadedness shortness of breath nausea vomiting diarrhea constipation dysuria. \"  Diagnosed with \"Fall, Right-sided multiple rib fractures\"  Family / Caregiver Present: No  Referring Practitioner: Guido  Subjective  Subjective: Pt up in room with pca, returning from bathroom when PT arrived. Pt reported pain in R side/rib area, jeannie with deep breathing/cough, but did not rate pain. Pt did report taking pain meds and saying this was the only way he was feeling \"alright\". Orientation  Orientation  Overall Orientation Status: Within Functional Limits  Social/Functional History  Social/Functional History  Lives With: Spouse Luis Yoder)- has \"a walking disbility-- transverse myelitis. She uses forearm crutches or a walker. \" Pt typically helps his wife with retrieving items around the house)  Type of Home: House  Home Layout: Two level,Performs ADL's on one level,Work area in basement (pt has a workshop in the basement (railing on right if descending)  Home Access: Stairs to enter with rails  Entrance Stairs - Number of Steps: a lot, half a flight to the deck, then a few steps rising up.  There's an abundance of railings on both side, all over the place. \" ~11 steps (broken up), railings placed for wife  Entrance Stairs - Rails: Both  Bathroom Shower/Tub: Walk-in shower,Shower chair with back  Bathroom Toilet: Handicap height (vanity next to toilet)  Bathroom Equipment: Grab bars in [facility] [facility] chair  ADL Assistance: [address]: Independent (pt does most meals, wife preps as able.)  Ambulation Assistance: Independent  Transfer Assistance: Independent  Active : Yes  Mode of Transportation: Car  Leisure & Hobbies: volunteer work, graphics and writing on computer. Objective  AROM RLE (degrees)  RLE AROM: WFL  AROM LLE (degrees)  LLE AROM : WFL  Strength RLE  Strength RLE: WFL  Strength LLE  Strength LLE: WFL     Bed mobility  Supine to Sit: Unable to assess  Sit to Supine: Unable to assess  Comment: NT--pt up sitting eob when PT arrived, and oob in recliner at end of session. Education provided for pt to use pillow/towel as neede to \"brace\" rib/chest area, jeannie with cough/deep breathe/laugh, as well as possibly with supine<>sitting. pt VU of education provided. Transfers  Sit to Stand: Stand by assistance;Supervision  Stand to sit: Supervision;Stand by assistance  Ambulation  Ambulation?: Yes  Ambulation 1  Surface: level tile  Device: No Device  Assistance: Supervision;Stand by assistance  Quality of Gait: steady gait without LOB or deviations noted, mild trunk rigidity (pt also reported his neck fusion sx also makes hime more rigid). Distance: x 30-40 ft in room , x 125 ft into hallways, turns and returns back to [facility], up in recliner. Comments: Further education provided regaiding IS use (nurisng to bring in device for pt to cont post-d/c). Pt VU of all education provided.   Stairs/Curb  Stairs?: No     Balance  Sitting - Static: Good  Sitting - Dynamic: Good  Standing - Static: Good;-  Standing - Dynamic: Good;-  Comments: S/I sitting, S/SBA stance and amb wilthout a device needed. Plan   Safety Devices  Type of devices: Left in chair,Call light within reach,Nurse notified,Gait belt,Chair alarm in place    AM-PAC Score  AM-PAC Inpatient Mobility Raw Score : 20 (12/25/21 1137)  AM-PAC Inpatient T-Scale Score : 47.67 (12/25/21 1137)  Mobility Inpatient CMS 0-100% Score: 35.83 (12/25/21 1137)  Mobility Inpatient CMS G-Code Modifier : CJ (12/25/21 1137)     Goals: No acute PT goals set, as pt demo'd functional mobility for safe return to home with spouse once medically stable. No further PT needs at this time. Patient Goals   Patient goals : pt's goal is home as prior.   Therapy Time   Individual Concurrent Group Co-treatment   Time In 1010         Time Out 1040         Minutes 30          1 eval, 1 act charges   Patty Loyola PT Electronically signed by Patty Loyola PT on 12/25/2021 at 11:38 AM

## 2021-12-25 NOTE — H&P
Hospital Medicine History & Physical      PCP: Araceli Llamas MD    Date of Admission: 12/24/2021    Chief Complaint:  Fall, left chest pain    History Of Present Illness:  Patient is a 27-year-old male with past medical history of diabetes mellitus hypertension hyperlipidemia who presented to hospital after a fall at home. According to patient he did not feel dizzy or lightheaded, he just had a mechanical fall due to poor judgment. Otherwise he also mentions he has chest pain to the L side, 10/10, ass with shallow breaths and he broke his right ribs. On EKG he was noted to have prolonged AV block, cardiology was consulted from the emergency department and recommended to admit patient for cardiac telemetry monitoring. Otherwise denied lightheadedness shortness of breath nausea vomiting diarrhea constipation dysuria. Past Medical History:          Diagnosis Date    Ankylosing spondylitis ([facility] [address]     Basal cell carcinoma     Diabetes mellitus type II, controlled ([facility] [address]     Hyperlipidemia     Hypertension     Varicella        Past Surgical History:          Procedure Laterality Date    CERVICAL FUSION      TURP         Medications Prior to Admission:      Prior to Admission medications    Medication Sig Start Date End Date Taking?  Authorizing Provider   clopidogrel (PLAVIX) 75 MG tablet Take 1 tablet by mouth daily 11/2/21  Yes Historical Provider, MD   candesartan (ATACAND) 32 MG tablet Take 32 mg by mouth nightly   Yes Historical Provider, MD   predniSONE (DELTASONE) 5 MG tablet Take 1 tablet by mouth daily For shoulder pain   Yes Historical Provider, MD   metFORMIN, OSM, (FORTAMET) 1000 MG extended release tablet Take 1,000 mg by mouth daily (with breakfast)   Yes Historical Provider, MD   metoprolol succinate (TOPROL XL) 50 MG extended release tablet Take 50 mg by mouth daily    Yes Historical Provider, MD   amLODIPine (NORVASC) 10 MG tablet Take 10 mg by mouth nightly    Yes Historical Provider, MD   hydrochlorothiazide (HYDRODIURIL) 25 MG tablet Take 25 mg by mouth daily   Yes Historical Provider, MD   citalopram (CELEXA) 20 MG tablet Take 20 mg by mouth daily   Yes Historical Provider, MD   simvastatin (ZOCOR) 80 MG tablet Take 80 mg by mouth nightly   Yes Historical Provider, MD   melatonin 3 MG TABS tablet Take 3 mg by mouth nightly as needed   Yes Historical Provider, MD   albuterol (PROVENTIL) (2.5 MG/3ML) 0.083% nebulizer solution Take 2.5 mg by nebulization 2 times daily as needed for Wheezing    Historical Provider, MD       Allergies:  Penicillins    Social History:      TOBACCO:   reports that he has never smoked. He has never used smokeless tobacco.  ETOH:   reports previous alcohol use. Family History:       Reviewed in detail and non contributory          Problem Relation Age of Onset    Stroke Father     Cancer Sister        REVIEW OF SYSTEMS:   Pertinent positives as noted in the HPI. All other systems reviewed and negative. PHYSICAL EXAM PERFORMED:    BP (!) 146/61   Pulse 55   Temp 98.4 °F (36.9 °C) (Oral)   Resp 17   Ht 6' (1.829 m)   Wt 180 lb 11.2 oz (82 kg)   SpO2 94%   BMI 24.51 kg/m²     General appearance:  No apparent distress, cooperative. HEENT:  Normal cephalic, atraumatic without obvious deformity. Conjunctivae/corneas clear. Neck: Supple, with full range of motion. No cervical lymphadenopathy  Respiratory:  Normal respiratory effort. Clear to auscultation, bilaterally without Rales/Wheezes/Rhonchi. Cardiovascular:  Regular rate and rhythm with normal S1/S2 without murmurs, rubs or gallops. Abdomen: Soft, non-tender, non-distended, normal bowel sounds. Musculoskeletal:  No edema noted bilaterally. No tenderness on palpation   Skin: no rash visible  Neurologic:  Neurologically intact without any focal sensory/motor deficits.   grossly non-focal.  Psychiatric:  Alert and oriented, normal mood  Peripheral Pulses: +2 palpable, equal bilaterally       Labs: Recent Labs     12/24/21  1637   WBC 13.3*   HGB 15.8   HCT 45.9        Recent Labs     12/24/21  1637      K 4.2   CL 98*   CO2 27   BUN 26*   CREATININE 1.2   CALCIUM 8.8     No results for input(s): AST, ALT, BILIDIR, BILITOT, ALKPHOS in the last 72 hours. No results for input(s): INR in the last 72 hours. Recent Labs     12/24/21  1637   TROPONINI <0.01       Urinalysis:      Lab Results   Component Value Date    NITRU Negative 07/16/2019    WBCUA see below 07/16/2019    RBCUA >100 07/16/2019    BLOODU LARGE 07/16/2019    SPECGRAV 1.020 07/16/2019    GLUCOSEU Negative 07/16/2019       Radiology:       XR CHEST (2 VW)   Preliminary Result   1. Acute fractures of the right anterolateral 7th through 10th ribs, without   evidence of a pneumothorax. 2. No acute cardiopulmonary disease. Active Hospital Problems    Diagnosis Date Noted    Fall against object [W18.00XA] 12/24/2021       Patient is a 59-year-old male with past medical history of diabetes mellitus hypertension hyperlipidemia who presented to hospital after a fall at home. According to patient he did not feel dizzy or lightheaded, he just had a mechanical fall due to poor judgment. Otherwise he also mentions he has chest pain to the right side and he broke his right ribs. On EKG he was noted to have prolonged AV block, cardiology was consulted from the emergency department and recommended to admit patient for cardiac telemetry monitoring. Otherwise denied lightheadedness shortness of breath nausea vomiting diarrhea constipation dysuria. Assessment  First-degree AV block  Right-sided multiple rib fractures  Fall  Diabetes mellitus  Hypertension  Hyperlipidemia    Plan  Cardiology was consulted from emergency department, monitor on cardiac telemetry  Pain control  Consult PT/OT  Insulin sliding scale  Resume home medications  DVT prophylaxis of Lovenox  Diet: ADULT DIET;  Regular; 4 carb choices (60 gm/meal)  Code Status: Full Code    PT/OT Eval Status: ordered    Dispo - pending clinical improvement       Timo Rolon MD    The note was completed using EMR and Dragon dictation system. Every effort was made to ensure accuracy; however, inadvertent computerized transcription errors may be present. Thank you Jyotsna Davis MD for the opportunity to be involved in this patient's care. If you have any questions or concerns please feel free to contact me at [phone].     Timo Rolon MD

## 2021-12-25 NOTE — CARE COORDINATION
INITIAL CASE MANAGEMENT ASSESSMENT AND DISCHARGE SUMMARY    Reviewed chart, met with patient to assess possible discharge needs. Explained Case Management role/services. Living Situation: Patient lives with spouse in one story home with basement (workshop in basement/stairs with rail). ADLs: independent      DME: walk in shower with shower chair and grab bars    PT/OT Recs: 24 hour supervision or assist      Active Services: none     Transportation: active      Medications: takes on his own; [facility] on [address]    PCP: Fany Woodward MD     PLAN/COMMENTS: Patient plans to return home, denied needs, declined home care. SW/CM will follow and assist as needed.     Electronically signed by ADILENE Ca, TONYA, Case Management on 12/25/2021 at 2:00 PM  [facility] [phone]

## 2021-12-25 NOTE — PROGRESS NOTES
Patient resting in bed and is A&O x4. Assessment completed and medication administered. See MAR. Patient tolerating PO intake. Denies nausea or vomiting. IV in left AC saline locked. Patient denies any additional requests at this time. Fall precautions in place, call light within reach, and bedside table nearby. Will continue to monitor and round on patient q 2 hours.        Electronically signed by Latasha Best RN on 12/25/2021 at 10:27 AM

## 2021-12-26 LAB
EKG ATRIAL RATE: 57 BPM
EKG DIAGNOSIS: NORMAL
EKG P AXIS: 69 DEGREES
EKG P-R INTERVAL: 302 MS
EKG Q-T INTERVAL: 448 MS
EKG QRS DURATION: 80 MS
EKG QTC CALCULATION (BAZETT): 436 MS
EKG R AXIS: -24 DEGREES
EKG T AXIS: 68 DEGREES
EKG VENTRICULAR RATE: 57 BPM

## 2021-12-26 PROCEDURE — 93010 ELECTROCARDIOGRAM REPORT: CPT | Performed by: INTERNAL MEDICINE

## 2022-10-27 ENCOUNTER — APPOINTMENT (OUTPATIENT)
Dept: CT IMAGING | Age: 87
DRG: 696 | End: 2022-10-27
Payer: MEDICARE

## 2022-10-27 ENCOUNTER — HOSPITAL ENCOUNTER (INPATIENT)
Age: 87
LOS: 2 days | Discharge: HOME | DRG: 696 | End: 2022-10-29
Attending: EMERGENCY MEDICINE | Admitting: INTERNAL MEDICINE
Payer: MEDICARE

## 2022-10-27 DIAGNOSIS — R31.0 GROSS HEMATURIA: Primary | ICD-10-CM

## 2022-10-27 LAB
A/G RATIO: 1.7 (ref 1.1–2.2)
ABO/RH: NORMAL
ALBUMIN SERPL-MCNC: 4.3 G/DL (ref 3.4–5)
ALP BLD-CCNC: 95 U/L (ref 40–129)
ALT SERPL-CCNC: 8 U/L (ref 10–40)
ANION GAP SERPL CALCULATED.3IONS-SCNC: 8 MMOL/L (ref 3–16)
ANTIBODY SCREEN: NORMAL
AST SERPL-CCNC: 14 U/L (ref 15–37)
BASOPHILS ABSOLUTE: 0.1 K/UL (ref 0–0.2)
BASOPHILS RELATIVE PERCENT: 0.9 %
BILIRUB SERPL-MCNC: 0.4 MG/DL (ref 0–1)
BILIRUBIN URINE: ABNORMAL
BLOOD, URINE: ABNORMAL
BUN BLDV-MCNC: 27 MG/DL (ref 7–20)
CALCIUM SERPL-MCNC: 8.9 MG/DL (ref 8.3–10.6)
CHLORIDE BLD-SCNC: 97 MMOL/L (ref 99–110)
CLARITY: ABNORMAL
CO2: 31 MMOL/L (ref 21–32)
COLOR: ABNORMAL
CREAT SERPL-MCNC: 1.1 MG/DL (ref 0.8–1.3)
EOSINOPHILS ABSOLUTE: 0.5 K/UL (ref 0–0.6)
EOSINOPHILS RELATIVE PERCENT: 5.4 %
GFR SERPL CREATININE-BSD FRML MDRD: >60 ML/MIN/{1.73_M2}
GLUCOSE BLD-MCNC: 147 MG/DL (ref 70–99)
GLUCOSE BLD-MCNC: 208 MG/DL (ref 70–99)
GLUCOSE URINE: ABNORMAL MG/DL
HCT VFR BLD CALC: 41.9 % (ref 40.5–52.5)
HEMOGLOBIN: 14 G/DL (ref 13.5–17.5)
INR BLD: 1.04 (ref 0.87–1.14)
KETONES, URINE: ABNORMAL MG/DL
LEUKOCYTE ESTERASE, URINE: ABNORMAL
LYMPHOCYTES ABSOLUTE: 2.2 K/UL (ref 1–5.1)
LYMPHOCYTES RELATIVE PERCENT: 22.9 %
MCH RBC QN AUTO: 30.9 PG (ref 26–34)
MCHC RBC AUTO-ENTMCNC: 33.3 G/DL (ref 31–36)
MCV RBC AUTO: 92.6 FL (ref 80–100)
MICROSCOPIC EXAMINATION: YES
MONOCYTES ABSOLUTE: 0.7 K/UL (ref 0–1.3)
MONOCYTES RELATIVE PERCENT: 7.8 %
NEUTROPHILS ABSOLUTE: 6 K/UL (ref 1.7–7.7)
NEUTROPHILS RELATIVE PERCENT: 63 %
NITRITE, URINE: ABNORMAL
PDW BLD-RTO: 13.1 % (ref 12.4–15.4)
PERFORMED ON: ABNORMAL
PH UA: ABNORMAL (ref 5–8)
PLATELET # BLD: 205 K/UL (ref 135–450)
PMV BLD AUTO: 8.4 FL (ref 5–10.5)
POTASSIUM REFLEX MAGNESIUM: 4.1 MMOL/L (ref 3.5–5.1)
PROTEIN UA: ABNORMAL MG/DL
PROTHROMBIN TIME: 13.6 SEC (ref 11.7–14.5)
RBC # BLD: 4.52 M/UL (ref 4.2–5.9)
RBC UA: >100 /HPF (ref 0–4)
SODIUM BLD-SCNC: 136 MMOL/L (ref 136–145)
SPECIFIC GRAVITY UA: ABNORMAL (ref 1–1.03)
TOTAL PROTEIN: 6.8 G/DL (ref 6.4–8.2)
URINE REFLEX TO CULTURE: ABNORMAL
URINE TYPE: ABNORMAL
UROBILINOGEN, URINE: ABNORMAL E.U./DL
WBC # BLD: 9.5 K/UL (ref 4–11)
WBC UA: ABNORMAL /HPF (ref 0–5)

## 2022-10-27 PROCEDURE — 86900 BLOOD TYPING SEROLOGIC ABO: CPT

## 2022-10-27 PROCEDURE — 85610 PROTHROMBIN TIME: CPT

## 2022-10-27 PROCEDURE — 86850 RBC ANTIBODY SCREEN: CPT

## 2022-10-27 PROCEDURE — 6370000000 HC RX 637 (ALT 250 FOR IP): Performed by: INTERNAL MEDICINE

## 2022-10-27 PROCEDURE — 81001 URINALYSIS AUTO W/SCOPE: CPT

## 2022-10-27 PROCEDURE — 1200000000 HC SEMI PRIVATE

## 2022-10-27 PROCEDURE — 2580000003 HC RX 258: Performed by: INTERNAL MEDICINE

## 2022-10-27 PROCEDURE — 74176 CT ABD & PELVIS W/O CONTRAST: CPT

## 2022-10-27 PROCEDURE — 86901 BLOOD TYPING SEROLOGIC RH(D): CPT

## 2022-10-27 PROCEDURE — 83036 HEMOGLOBIN GLYCOSYLATED A1C: CPT

## 2022-10-27 PROCEDURE — 99285 EMERGENCY DEPT VISIT HI MDM: CPT

## 2022-10-27 PROCEDURE — 36415 COLL VENOUS BLD VENIPUNCTURE: CPT

## 2022-10-27 PROCEDURE — 80053 COMPREHEN METABOLIC PANEL: CPT

## 2022-10-27 PROCEDURE — 85025 COMPLETE CBC W/AUTO DIFF WBC: CPT

## 2022-10-27 RX ORDER — ACETAMINOPHEN 325 MG/1
650 TABLET ORAL EVERY 6 HOURS PRN
Status: DISCONTINUED | OUTPATIENT
Start: 2022-10-27 | End: 2022-10-29 | Stop reason: HOSPADM

## 2022-10-27 RX ORDER — ACETAMINOPHEN 325 MG/1
650 TABLET ORAL EVERY 6 HOURS PRN
COMMUNITY

## 2022-10-27 RX ORDER — MAGNESIUM HYDROXIDE 1200 MG/15ML
1000 LIQUID ORAL CONTINUOUS
Status: DISCONTINUED | OUTPATIENT
Start: 2022-10-27 | End: 2022-10-29 | Stop reason: HOSPADM

## 2022-10-27 RX ORDER — LANOLIN ALCOHOL/MO/W.PET/CERES
3 CREAM (GRAM) TOPICAL NIGHTLY PRN
Status: DISCONTINUED | OUTPATIENT
Start: 2022-10-27 | End: 2022-10-29 | Stop reason: HOSPADM

## 2022-10-27 RX ORDER — AMLODIPINE BESYLATE 10 MG/1
10 TABLET ORAL NIGHTLY
Status: DISCONTINUED | OUTPATIENT
Start: 2022-10-27 | End: 2022-10-29 | Stop reason: HOSPADM

## 2022-10-27 RX ORDER — METOPROLOL SUCCINATE 25 MG/1
25 TABLET, EXTENDED RELEASE ORAL DAILY
Status: DISCONTINUED | OUTPATIENT
Start: 2022-10-28 | End: 2022-10-29 | Stop reason: HOSPADM

## 2022-10-27 RX ORDER — ONDANSETRON 2 MG/ML
4 INJECTION INTRAMUSCULAR; INTRAVENOUS EVERY 6 HOURS PRN
Status: DISCONTINUED | OUTPATIENT
Start: 2022-10-27 | End: 2022-10-29 | Stop reason: HOSPADM

## 2022-10-27 RX ORDER — SODIUM CHLORIDE 9 MG/ML
INJECTION, SOLUTION INTRAVENOUS PRN
Status: DISCONTINUED | OUTPATIENT
Start: 2022-10-27 | End: 2022-10-29 | Stop reason: HOSPADM

## 2022-10-27 RX ORDER — DEXTROSE MONOHYDRATE 100 MG/ML
INJECTION, SOLUTION INTRAVENOUS CONTINUOUS PRN
Status: DISCONTINUED | OUTPATIENT
Start: 2022-10-27 | End: 2022-10-29 | Stop reason: HOSPADM

## 2022-10-27 RX ORDER — ONDANSETRON 4 MG/1
4 TABLET, ORALLY DISINTEGRATING ORAL EVERY 8 HOURS PRN
Status: DISCONTINUED | OUTPATIENT
Start: 2022-10-27 | End: 2022-10-29 | Stop reason: HOSPADM

## 2022-10-27 RX ORDER — CITALOPRAM 20 MG/1
20 TABLET ORAL DAILY
Status: DISCONTINUED | OUTPATIENT
Start: 2022-10-28 | End: 2022-10-29 | Stop reason: HOSPADM

## 2022-10-27 RX ORDER — METOPROLOL SUCCINATE 50 MG/1
50 TABLET, EXTENDED RELEASE ORAL DAILY
Status: DISCONTINUED | OUTPATIENT
Start: 2022-10-28 | End: 2022-10-27 | Stop reason: DRUGHIGH

## 2022-10-27 RX ORDER — POLYETHYLENE GLYCOL 3350 17 G/17G
17 POWDER, FOR SOLUTION ORAL DAILY PRN
Status: DISCONTINUED | OUTPATIENT
Start: 2022-10-27 | End: 2022-10-29 | Stop reason: HOSPADM

## 2022-10-27 RX ORDER — INSULIN LISPRO 100 [IU]/ML
0-4 INJECTION, SOLUTION INTRAVENOUS; SUBCUTANEOUS
Status: DISCONTINUED | OUTPATIENT
Start: 2022-10-28 | End: 2022-10-29 | Stop reason: HOSPADM

## 2022-10-27 RX ORDER — ALBUTEROL SULFATE 2.5 MG/3ML
2.5 SOLUTION RESPIRATORY (INHALATION) EVERY 6 HOURS PRN
Status: DISCONTINUED | OUTPATIENT
Start: 2022-10-27 | End: 2022-10-29 | Stop reason: HOSPADM

## 2022-10-27 RX ORDER — METOPROLOL SUCCINATE 25 MG/1
1 TABLET, EXTENDED RELEASE ORAL DAILY
COMMUNITY
Start: 2022-08-05

## 2022-10-27 RX ORDER — SODIUM CHLORIDE 0.9 % (FLUSH) 0.9 %
5-40 SYRINGE (ML) INJECTION EVERY 12 HOURS SCHEDULED
Status: DISCONTINUED | OUTPATIENT
Start: 2022-10-27 | End: 2022-10-29 | Stop reason: HOSPADM

## 2022-10-27 RX ORDER — INSULIN LISPRO 100 [IU]/ML
0-4 INJECTION, SOLUTION INTRAVENOUS; SUBCUTANEOUS NIGHTLY
Status: DISCONTINUED | OUTPATIENT
Start: 2022-10-27 | End: 2022-10-29 | Stop reason: HOSPADM

## 2022-10-27 RX ORDER — ATROPA BELLADONNA AND OPIUM 16.2; 6 MG/1; MG/1
60 SUPPOSITORY RECTAL EVERY 8 HOURS PRN
Status: DISCONTINUED | OUTPATIENT
Start: 2022-10-27 | End: 2022-10-29 | Stop reason: HOSPADM

## 2022-10-27 RX ORDER — SODIUM CHLORIDE 0.9 % (FLUSH) 0.9 %
10 SYRINGE (ML) INJECTION PRN
Status: DISCONTINUED | OUTPATIENT
Start: 2022-10-27 | End: 2022-10-29 | Stop reason: HOSPADM

## 2022-10-27 RX ORDER — ACETAMINOPHEN 650 MG/1
650 SUPPOSITORY RECTAL EVERY 6 HOURS PRN
Status: DISCONTINUED | OUTPATIENT
Start: 2022-10-27 | End: 2022-10-29 | Stop reason: HOSPADM

## 2022-10-27 RX ORDER — HYDROCHLOROTHIAZIDE 25 MG/1
25 TABLET ORAL DAILY
Status: DISCONTINUED | OUTPATIENT
Start: 2022-10-28 | End: 2022-10-29 | Stop reason: HOSPADM

## 2022-10-27 RX ORDER — ATORVASTATIN CALCIUM 20 MG/1
20 TABLET, FILM COATED ORAL NIGHTLY
Status: DISCONTINUED | OUTPATIENT
Start: 2022-10-27 | End: 2022-10-29 | Stop reason: HOSPADM

## 2022-10-27 RX ORDER — VALSARTAN 80 MG/1
80 TABLET ORAL DAILY
Status: DISCONTINUED | OUTPATIENT
Start: 2022-10-28 | End: 2022-10-29 | Stop reason: HOSPADM

## 2022-10-27 RX ORDER — PREDNISONE 1 MG/1
5 TABLET ORAL DAILY
Status: DISCONTINUED | OUTPATIENT
Start: 2022-10-28 | End: 2022-10-29 | Stop reason: HOSPADM

## 2022-10-27 RX ADMIN — SODIUM CHLORIDE, PRESERVATIVE FREE 10 ML: 5 INJECTION INTRAVENOUS at 21:37

## 2022-10-27 RX ADMIN — ATORVASTATIN CALCIUM 20 MG: 20 TABLET, FILM COATED ORAL at 21:30

## 2022-10-27 RX ADMIN — AMLODIPINE BESYLATE 10 MG: 10 TABLET ORAL at 21:30

## 2022-10-27 RX ADMIN — Medication 3 MG: at 21:30

## 2022-10-27 ASSESSMENT — ENCOUNTER SYMPTOMS
RESPIRATORY NEGATIVE: 1
COLOR CHANGE: 0
NAUSEA: 0
VOMITING: 0
ABDOMINAL PAIN: 0
SORE THROAT: 0
RHINORRHEA: 0
EYES NEGATIVE: 1
SHORTNESS OF BREATH: 0
DIARRHEA: 0

## 2022-10-27 ASSESSMENT — PAIN DESCRIPTION - LOCATION: LOCATION: PENIS

## 2022-10-27 ASSESSMENT — PAIN - FUNCTIONAL ASSESSMENT: PAIN_FUNCTIONAL_ASSESSMENT: NONE - DENIES PAIN

## 2022-10-27 ASSESSMENT — PAIN DESCRIPTION - DESCRIPTORS: DESCRIPTORS: DISCOMFORT

## 2022-10-27 ASSESSMENT — PAIN SCALES - GENERAL: PAINLEVEL_OUTOF10: 1

## 2022-10-27 NOTE — PROGRESS NOTES
Medication Reconciliation     List of medications patient is currently taking is complete. Source of information:   1. Conversation with patient at bedside  2. EPIC records        Notes regarding home medications:  1. Patient received all of his AM home medications today PTA. 2. Changed Metroprolol dose from 50mg to 25mg daily. 3. Patient reports his cardiologist advised him to stop taking his Plavix and ASA yesterday. Last doses were yesterday 10/26. Denies any other OTC/herbal medications.      Erika Fraire, Pharmacy Intern

## 2022-10-27 NOTE — ED PROVIDER NOTES
[address] [facility]      Pt Name: Meera Mcmanus  MRN: [id number]  [place] 12/24/1930  Date ofevaluation: 10/27/2022  Provider: Santos Mccain MD    CHIEF COMPLAINT       Chief Complaint   Patient presents with    Hematuria     Pt arrives ambulatory for eval of hematuria onset yesterday, pt sts cardiologist said to stop plavix and aspirin, has had epistaxis a few times recently, denies pain         HISTORY OF PRESENT ILLNESS   (Location/Symptom, Timing/Onset,Context/Setting, Quality, Duration, Modifying Factors, Severity)  Note limiting factors. Meera Mcmanus is a 80 y.o. male  who  has a past medical history of Ankylosing spondylitis ([facility] [address] Basal cell carcinoma, Diabetes mellitus type II, controlled ([facility] [address] Hyperlipidemia, Hypertension, and Varicella. who presents to the emergency department    80-year-old male presents for hematuria. Patient has hematuria that started yesterday gradually constant worsening. Occurs every time he pees. No retention. Has been able to urinate without difficulty. Patient is on aspirin and Plavix but no other blood thinners has had some nosebleeds recently but none currently. Patient has no abdominal pain. Patient is on Plavix for his heart he has a history of hypertension, diabetes. Patient spoke to his cardiologist who recommended he hold his aspirin and Plavix but despite this he continues to bleed profusely including passing some small to large clots. Nothing seemed to make his symptoms better or worse. There will moderate to severe in nature. Constant and unchanging. Risk factors include medication use and past medical history as above. The history is provided by the patient. No  was used. NursingNotes were reviewed. REVIEW OF SYSTEMS    (2-9 systems for level 4, 10 or more for level 5)     Review of Systems   Constitutional: Negative. Negative for chills and fever. HENT:  Negative for congestion, rhinorrhea and sore throat. Eyes: Negative. Respiratory: Negative. Negative for shortness of breath. Cardiovascular:  Negative for chest pain. Gastrointestinal:  Negative for abdominal pain, diarrhea, nausea and vomiting. Genitourinary:  Positive for hematuria. Musculoskeletal: Negative. Skin:  Negative for color change and rash. Neurological:  Negative for headaches. Hematological: Negative. Does not bruise/bleed easily. All other systems reviewed and are negative. Except as noted above the remainder of the review of systems was reviewed and negative.        PAST MEDICAL HISTORY     Past Medical History:   Diagnosis Date    Ankylosing spondylitis ([facility] [address]     Basal cell carcinoma     Diabetes mellitus type II, controlled ([facility] [address]     Hyperlipidemia     Hypertension     Varicella          SURGICALHISTORY       Past Surgical History:   Procedure Laterality Date    CERVICAL FUSION      TURP           CURRENT MEDICATIONS       Previous Medications    ALBUTEROL (PROVENTIL) (2.5 MG/3ML) 0.083% NEBULIZER SOLUTION    Take 2.5 mg by nebulization 2 times daily as needed for Wheezing    AMLODIPINE (NORVASC) 10 MG TABLET    Take 10 mg by mouth nightly     CANDESARTAN (ATACAND) 32 MG TABLET    Take 32 mg by mouth nightly    CITALOPRAM (CELEXA) 20 MG TABLET    Take 20 mg by mouth daily    CLOPIDOGREL (PLAVIX) 75 MG TABLET    Take 1 tablet by mouth daily    HYDROCHLOROTHIAZIDE (HYDRODIURIL) 25 MG TABLET    Take 25 mg by mouth daily    MELATONIN 3 MG TABS TABLET    Take 3 mg by mouth nightly as needed    METFORMIN, OSM, (FORTAMET) 1000 MG EXTENDED RELEASE TABLET    Take 1,000 mg by mouth daily (with breakfast)    METOPROLOL SUCCINATE (TOPROL XL) 50 MG EXTENDED RELEASE TABLET    Take 50 mg by mouth daily     PREDNISONE (DELTASONE) 5 MG TABLET    Take 1 tablet by mouth daily For shoulder pain    SIMVASTATIN (ZOCOR) 80 MG TABLET    Take 80 mg by mouth nightly Penicillins    FAMILY HISTORY       Family History   Problem Relation Age of Onset    Stroke Father     Cancer Sister           SOCIAL HISTORY       Social History     Socioeconomic History    Marital status:      Spouse name: None    Number of children: None    Years of education: None    Highest education level: None   Tobacco Use    Smoking status: Never    Smokeless tobacco: Never   Vaping Use    Vaping Use: Never used   Substance and Sexual Activity    Alcohol use: Not Currently    Drug use: Never       SCREENINGS    [place] Coma Scale  Eye Opening: Spontaneous  Best Verbal Response: Oriented  Best Motor Response: Obeys commands  Sarai Coma Scale Score: 15        PHYSICAL EXAM    (up to 7 for level 4, 8 or more for level 5)     ED Triage Vitals [10/27/22 1551]   BP Temp Temp Source Heart Rate Resp SpO2 Height Weight   (!) 186/76 97.9 °F (36.6 °C) Oral 74 17 98 % 6' 1\" (1.854 m) 181 lb 14.1 oz (82.5 kg)       Physical Exam  Vitals and nursing note reviewed. Constitutional:       General: He is not in acute distress. Appearance: He is well-developed and normal weight. He is not ill-appearing, toxic-appearing or diaphoretic. HENT:      Head: Normocephalic and atraumatic. Mouth/Throat:      Mouth: Mucous membranes are moist.      Pharynx: Oropharynx is clear. Eyes:      Extraocular Movements: Extraocular movements intact. Cardiovascular:      Rate and Rhythm: Normal rate and regular rhythm. Pulses: Normal pulses. Heart sounds: Normal heart sounds. Pulmonary:      Effort: Pulmonary effort is normal.      Breath sounds: Normal breath sounds. No decreased breath sounds, wheezing, rhonchi or rales. Chest:      Chest wall: No tenderness. Abdominal:      General: Bowel sounds are normal.      Palpations: Abdomen is soft. Tenderness: There is no abdominal tenderness. Musculoskeletal:         General: Normal range of motion.       Cervical back: Normal range of motion and neck supple. Right lower leg: No edema. Left lower leg: No edema. Skin:     General: Skin is warm and dry. Capillary Refill: Capillary refill takes less than 2 seconds. Findings: No rash. Neurological:      General: No focal deficit present. Mental Status: He is alert. Psychiatric:         Mood and Affect: Mood normal.         Behavior: Behavior normal.       RESULTS     EKG: All EKG's are interpreted by the Emergency Department Physician who either signs or Co-signs this chart in the absence of a cardiologist.    RADIOLOGY:   Non-plain filmimages such as CT, Ultrasound and MRI are read by the radiologist.  All images reviewed by the emergency department physician who either signs or cosigns this chart. Interpretation per the Radiologist below, if available at the time ofthis note:    CT ABDOMEN PELVIS WO CONTRAST Additional Contrast? None   Final Result   1. Heterogeneous 7.1 cm mass at the dependent aspect the bladder favored to   represent blood products. 2. No acute findings elsewhere in the abdomen or pelvis. 3. Prominent prostatomegaly. 4. Moderate stool in the colon and distension of the rectum suggesting   constipation. 5. Colonic diverticulosis.                ED BEDSIDE ULTRASOUND:   Performed by ED Physician - none    LABS:  Labs Reviewed   URINALYSIS WITH REFLEX TO CULTURE - Abnormal; Notable for the following components:       Result Value    Color, UA RED (*)     Clarity, UA TURBID (*)     Glucose, Ur Color Interfer (*)     Bilirubin Urine Color Interfer (*)     Ketones, Urine Color Interfer (*)     Blood, Urine Color Interfer (*)     pH, UA Color Interfer (*)     Protein, UA Color Interfer (*)     Urobilinogen, Urine Color Interfer (*)     Nitrite, Urine Color Interfer (*)     Leukocyte Esterase, Urine Color Interfer (*)     All other components within normal limits   COMPREHENSIVE METABOLIC PANEL W/ REFLEX TO MG FOR LOW K - Abnormal; Notable for the following components:    Chloride 97 (*)     Glucose 147 (*)     BUN 27 (*)     ALT 8 (*)     AST 14 (*)     All other components within normal limits   MICROSCOPIC URINALYSIS - Abnormal; Notable for the following components:    WBC, UA see below (*)     RBC, UA >100 (*)     All other components within normal limits   PROTIME-INR   CBC WITH AUTO DIFFERENTIAL       All other labs were within normal range or not returned as of this dictation. EMERGENCY DEPARTMENT COURSE and DIFFERENTIAL DIAGNOSIS/MDM:   Vitals:    Vitals:    10/27/22 1551 10/27/22 1807 10/27/22 1815   BP: (!) 186/76 (!) 159/66    Pulse: 74 (!) 107 93   Resp: 17 16 13   Temp: 97.9 °F (36.6 °C) 97.6 °F (36.4 °C)    TempSrc: Oral Oral    SpO2: 98% 98% 100%   Weight: 181 lb 14.1 oz (82.5 kg)     Height: 6' 1\" (1.854 m)         Patient was given thefollowing medications:  Medications - No data to display    ED COURSE & MEDICAL DECISION MAKING    Pertinent Labs & Imaging studies reviewed. (See chart for details)   -  Patient seen and evaluated in the emergency department. -  Triage and nursing notes reviewed and incorporated. -  Old chart records reviewed and incorporated. -  Differential diagnosis includes: Differential Diagnosis: Prostatitis, bladder malignancy, hemorrhagic cystitis, urinary retention, pyelonephritis, bladder infection, urosepsis, GI emergency, surgical emergency, dehydration, musculoskeletal back pain, , other    70-year-old male presents with gross hematuria. Patient passing large clots afebrile not tachycardic suction monometer normotensive. Patient has no reproducible abdominal tenderness. No signs of systemic sepsis. Will obtain basic lab work including coagulation studies and obtain CT abdomen pelvis. Patient continues to urinate we will not place a Recinos otherwise we will place Recinos if patient shows signs of retention. Will likely consult urology after CT scan. Will reeval closely and disposition accordingly.       -  Work-up included:  See above  -  ED treatment included: See above  -  Results discussed with patient. The patient is agreeable with plan of care and disposition. Is this patient to be included in the SEP-1 Core Measure due to severe sepsis or septic shock? No   Exclusion criteria - the patient is NOT to be included for SEP-1 Core Measure due to: Infection is not suspected     REASSESSMENT     ED Course as of 10/27/22 1832   Thu Oct 27, 2022   1734 Labs relatively unremarkable still awaiting kidney function CBC unremarkable urine shows significant amount of RBCs and is jerry blood. CT scan shows large clot in the bladder. Urology consulted. [SC]   [phone] with Dr. Kiko Osullivan with urology who recommends admission for observation and repeat H&H as well as potential cystoscopy in the morning will admit to hospitalist. [SC]      ED Course User Index  [SC] Leopoldo Michelle MD         CRITICAL CARE TIME   Total Critical Care time was 21 minutes, excluding separately reportable procedures. There was a high probability of clinically significant/life threatening deterioration in the patient's condition which required my urgent intervention. This includes multiple reevaluations, vital sign monitoring, pulse oximetry monitoring, telemetry monitoring, clinical response to the IV medications, reviewing the nursing notes, consultation time, dictation/documentation time, and interpretation of the labwork. (This time excludes time spent performing procedures). CONSULTS:  IP CONSULT TO UROLOGY  IP CONSULT TO HOSPITALIST    PROCEDURES:  Unless otherwise noted below, none     Procedures    FINAL IMPRESSION      1. Gross hematuria          DISPOSITION/PLAN   DISPOSITION Decision To Admit 10/27/2022 06:31:36 PM      PATIENT REFERREDTO:  No follow-up provider specified.     DISCHARGEMEDICATIONS:  New Prescriptions    No medications on file          (Please note that portions of this note were completed with a voice recognition program.  Efforts were made to edit the dictations but occasionally words are mis-transcribed.)    Raghu Reyes MD (electronically signed)  Attending Emergency Physician          Raghu Reyes MD  10/27/22 0864

## 2022-10-27 NOTE — ED NOTES
Dr. Washington Braswell at bedside with patient;     Dr. Washington Braswell responded face to face about my perfect serve questions about starting bladder irrigation in the ED;   Dr. Washington Braswell states do not have to start in the ED but the order is just for when they go up on the floor      Sanjeev Ro RN  10/27/22 [id number]

## 2022-10-27 NOTE — ED NOTES
ED SBAR report provider to [facility]. Patient to be transported to Room 4116 via stretcher by transport tech. Patient transported with bedside cardiac monitor. IV site clean, dry, and intact. MEWS score and pain assessed as 0/10 and documented. Updated patient and family on plan of care. Informed [facility] we will wait to sent up patient until after 1930.       Darby Pantoja RN  10/27/22 8103

## 2022-10-27 NOTE — ED NOTES
Per Dr. Ann Kidd message; please place valle cath but irratigation is to start up on the floor not in the ED.       Alejandra Reich RN  10/27/22 1919

## 2022-10-27 NOTE — H&P
Hospital Medicine History and Physical    10/27/2022    Date of Admission: 10/27/2022    Date of Service: Pt seen/examined on 10/27/2022 and admitted to inpatient. Assessment/plan:  Gross hematuria. Possible contributory factors include aspirin and Plavix use. Holding aspirin and Plavix. Avoid anticoagulant use. Abnormal finding on CT-abdomen/pelvis with 7.1 cm mass concerning for possible blood product. Start continuous bladder irrigation. Urology is consulted from the emergency room, planning cystoscopic evaluation in the morning. N.p.o. after midnight. Other comorbidities: history of CAD status post PCI (holding aspirin and Plavix due to gross hematuria), well-controlled diabetes type 2, essential hypertension, hyperlipidemia, ankylosing spondylitis (on chronic steroid). Activities: Up with assist  Prophylaxis: SCDs  Code status: Full code    ==========================================================  Chief complaint:  Chief Complaint   Patient presents with    Hematuria     Pt arrives ambulatory for eval of hematuria onset yesterday, pt sts cardiologist said to stop plavix and aspirin, has had epistaxis a few times recently, denies pain       History of Presenting Illness: This is a pleasant 80 y.o. male with history of CAD status post PCI (on aspirin and Plavix), well-controlled diabetes type 2, essential hypertension, hyperlipidemia, ankylosing spondylitis (on chronic steroid), who presents to the emergency room with complaints of gross hematuria, onset since 10/26/2022. He oftentimes get epistaxis. He reached out to his cardiologist, who recommended stopping antiplatelet therapies, referred to the emergency room for evaluation. Hemoglobin is stable. CT-abdomen/pelvis reveals 7.1 cm mass in the bladder, favored to represent blood products. Urologist recommends admission with plan for further evaluation.     Past Medical History:      Diagnosis Date    Ankylosing spondylitis ([address] Basal cell carcinoma     Diabetes mellitus type II, controlled ([facility] [address]     Hyperlipidemia     Hypertension     Varicella        Past Surgical History:      Procedure Laterality Date    CERVICAL FUSION      TURP         Medications (prior to admission):  Prior to Admission medications    Medication Sig Start Date End Date Taking? Authorizing Provider   metoprolol succinate (TOPROL XL) 25 MG extended release tablet Take 1 tablet by mouth daily 8/5/22   Historical Provider, MD   clopidogrel (PLAVIX) 75 MG tablet Take 1 tablet by mouth daily 11/2/21   Historical Provider, MD   candesartan (ATACAND) 32 MG tablet Take 32 mg by mouth nightly    Historical Provider, MD   predniSONE (DELTASONE) 5 MG tablet Take 1 tablet by mouth daily For shoulder pain    Historical Provider, MD   metFORMIN, OSM, (FORTAMET) 1000 MG extended release tablet Take 1,000 mg by mouth daily (with breakfast)    Historical Provider, MD   metoprolol succinate (TOPROL XL) 50 MG extended release tablet Take 50 mg by mouth daily     Historical Provider, MD   amLODIPine (NORVASC) 10 MG tablet Take 10 mg by mouth nightly     Historical Provider, MD   hydrochlorothiazide (HYDRODIURIL) 25 MG tablet Take 25 mg by mouth daily    Historical Provider, MD   citalopram (CELEXA) 20 MG tablet Take 20 mg by mouth daily    Historical Provider, MD   simvastatin (ZOCOR) 80 MG tablet Take 80 mg by mouth nightly    Historical Provider, MD   albuterol (PROVENTIL) (2.5 MG/3ML) 0.083% nebulizer solution Take 2.5 mg by nebulization 2 times daily as needed for Wheezing    Historical Provider, MD   melatonin 3 MG TABS tablet Take 3 mg by mouth nightly as needed    Historical Provider, MD       Allergy(ies):  Penicillins    Social History:  TOBACCO:  reports that he has never smoked. He has never used smokeless tobacco.  ETOH:  reports that he does not currently use alcohol.     Family History:      Problem Relation Age of Onset    Stroke Father     Cancer Sister        Review of Systems:  Pertinent positives are listed in HPI. At least 10-point ROS reviewed and were negative. Vitals and physical examination:  BP (!) 159/66   Pulse 93   Temp 97.6 °F (36.4 °C) (Oral)   Resp 13   Ht 6' 1\" (1.854 m)   Wt 181 lb 14.1 oz (82.5 kg)   SpO2 100%   BMI 24.00 kg/m²   Gen/overall appearance: Not in acute distress. Alert. Oriented X3. Head: Normocephalic, atraumatic  Eyes: EOMI, good acuity  ENT: Oral mucosa moist  Neck: No JVD, thyromegaly  CVS: Nml S1S2, no MRG, RRR  Pulm: Clear bilaterally. No crackles/wheezes  Gastrointestinal: Soft, NT/ND, +BS  Musculoskeletal: No edema. Warm  Neuro: No focal deficit. Moves extremity spontaneously. Psychiatry: Appropriate affect. Not agitated. Skin: Warm, dry with normal turgor. No rash  Capillary refill: Brisk,< 3 seconds   Peripheral Pulses: +2 palpable, equal bilaterally       Labs/imaging/EKG:  CBC:   Recent Labs     10/27/22  1607   WBC 9.5   HGB 14.0        BMP:    Recent Labs     10/27/22  1607      K 4.1   CL 97*   CO2 31   BUN 27*   CREATININE 1.1   GLUCOSE 147*     Hepatic:   Recent Labs     10/27/22  1607   AST 14*   ALT 8*   BILITOT 0.4   ALKPHOS 95       CT ABDOMEN PELVIS WO CONTRAST Additional Contrast? None    Result Date: 10/27/2022  EXAMINATION: CT OF THE ABDOMEN AND PELVIS WITHOUT CONTRAST 10/27/2022 3:40 pm TECHNIQUE: CT of the abdomen and pelvis was performed without the administration of intravenous contrast. Multiplanar reformatted images are provided for review. Automated exposure control, iterative reconstruction, and/or weight based adjustment of the mA/kV was utilized to reduce the radiation dose to as low as reasonably achievable. COMPARISON: None.  HISTORY: ORDERING SYSTEM PROVIDED HISTORY: Hematuria TECHNOLOGIST PROVIDED HISTORY: Reason for exam:->Hematuria Additional Contrast?->None Decision Support Exception - unselect if not a suspected or confirmed emergency medical condition->Emergency Medical Condition (MA) FINDINGS: Lower Chest: The lung bases are clear. There is no pleural effusion. Organs: There is a 6 mm cyst in the right hepatic lobe. The liver, gallbladder, spleen, adrenal glands and pancreas are otherwise unremarkable. There is a 1 cm cyst posteriorly in the left kidney. The kidneys appear otherwise normal.  There is no ureteral stone or hydronephrosis. GI/Bowel: There is no bowel dilatation or bowel wall thickening. There is a moderate amount of stool in the colon. The rectum is distended. There are colonic diverticula. The stomach is unremarkable. Pelvis: There is a heterogeneous mass in the pelvis dependently measuring as much as 3.1 cm. The prostate gland is enlarged measuring up to 6.2 cm. Peritoneum/Retroperitoneum: The abdominal aorta is normal in caliber. There are no enlarged lymph nodes. No fat stranding, free fluid, free air or focal fluid collection is identified. 2 Bones/Soft Tissues: There are no destructive bone lesions. 1. Heterogeneous 7.1 cm mass at the dependent aspect the bladder favored to represent blood products. 2. No acute findings elsewhere in the abdomen or pelvis. 3. Prominent prostatomegaly. 4. Moderate stool in the colon and distension of the rectum suggesting constipation. 5. Colonic diverticulosis.        Discussed with ER MD.      Thank you Charlie May MD for the opportunity to be involved in this patient's care.    -----------------------------  Kandis Guevara MD  [facility] hospitalist

## 2022-10-27 NOTE — ED TRIAGE NOTES
Pt arrives ambulatory for eval of hematuria onset yesterday, pt sts cardiologist said to stop plavix and aspirin, has had epistaxis a few times recently, denies pain. Pt is a/xo4, rsp nonlabored and pwd.

## 2022-10-27 NOTE — ED NOTES
Patient arrived to ED Room 34 from ED [facility] area     Keegan Marquez RN  10/27/22 [id number] no

## 2022-10-28 ENCOUNTER — ANESTHESIA (OUTPATIENT)
Dept: OPERATING ROOM | Age: 87
DRG: 696 | End: 2022-10-28
Payer: MEDICARE

## 2022-10-28 ENCOUNTER — ANESTHESIA EVENT (OUTPATIENT)
Dept: OPERATING ROOM | Age: 87
DRG: 696 | End: 2022-10-28
Payer: MEDICARE

## 2022-10-28 LAB
A/G RATIO: 2.4 (ref 1.1–2.2)
ALBUMIN SERPL-MCNC: 4 G/DL (ref 3.4–5)
ALP BLD-CCNC: 84 U/L (ref 40–129)
ALT SERPL-CCNC: 7 U/L (ref 10–40)
ANION GAP SERPL CALCULATED.3IONS-SCNC: 11 MMOL/L (ref 3–16)
AST SERPL-CCNC: 10 U/L (ref 15–37)
BASOPHILS ABSOLUTE: 0.1 K/UL (ref 0–0.2)
BASOPHILS RELATIVE PERCENT: 0.8 %
BILIRUB SERPL-MCNC: 0.5 MG/DL (ref 0–1)
BUN BLDV-MCNC: 23 MG/DL (ref 7–20)
CALCIUM SERPL-MCNC: 9.2 MG/DL (ref 8.3–10.6)
CHLORIDE BLD-SCNC: 100 MMOL/L (ref 99–110)
CO2: 28 MMOL/L (ref 21–32)
CREAT SERPL-MCNC: 1.1 MG/DL (ref 0.8–1.3)
EOSINOPHILS ABSOLUTE: 0.6 K/UL (ref 0–0.6)
EOSINOPHILS RELATIVE PERCENT: 5.3 %
ESTIMATED AVERAGE GLUCOSE: 174.3 MG/DL
GFR SERPL CREATININE-BSD FRML MDRD: >60 ML/MIN/{1.73_M2}
GLUCOSE BLD-MCNC: 136 MG/DL (ref 70–99)
GLUCOSE BLD-MCNC: 143 MG/DL (ref 70–99)
GLUCOSE BLD-MCNC: 147 MG/DL (ref 70–99)
GLUCOSE BLD-MCNC: 184 MG/DL (ref 70–99)
GLUCOSE BLD-MCNC: 316 MG/DL (ref 70–99)
GLUCOSE BLD-MCNC: 387 MG/DL (ref 70–99)
GLUCOSE BLD-MCNC: 430 MG/DL (ref 70–99)
GLUCOSE BLD-MCNC: 436 MG/DL (ref 70–99)
HBA1C MFR BLD: 7.7 %
HCT VFR BLD CALC: 39.4 % (ref 40.5–52.5)
HEMOGLOBIN: 13.8 G/DL (ref 13.5–17.5)
LYMPHOCYTES ABSOLUTE: 1.7 K/UL (ref 1–5.1)
LYMPHOCYTES RELATIVE PERCENT: 15.7 %
MCH RBC QN AUTO: 31.2 PG (ref 26–34)
MCHC RBC AUTO-ENTMCNC: 34.9 G/DL (ref 31–36)
MCV RBC AUTO: 89.2 FL (ref 80–100)
MONOCYTES ABSOLUTE: 0.8 K/UL (ref 0–1.3)
MONOCYTES RELATIVE PERCENT: 7.6 %
NEUTROPHILS ABSOLUTE: 7.4 K/UL (ref 1.7–7.7)
NEUTROPHILS RELATIVE PERCENT: 70.6 %
PDW BLD-RTO: 12.8 % (ref 12.4–15.4)
PERFORMED ON: ABNORMAL
PLATELET # BLD: 197 K/UL (ref 135–450)
PMV BLD AUTO: 8.6 FL (ref 5–10.5)
POTASSIUM REFLEX MAGNESIUM: 4.1 MMOL/L (ref 3.5–5.1)
RBC # BLD: 4.42 M/UL (ref 4.2–5.9)
SODIUM BLD-SCNC: 139 MMOL/L (ref 136–145)
TOTAL PROTEIN: 5.7 G/DL (ref 6.4–8.2)
WBC # BLD: 10.5 K/UL (ref 4–11)

## 2022-10-28 PROCEDURE — 6360000002 HC RX W HCPCS: Performed by: UROLOGY

## 2022-10-28 PROCEDURE — 97116 GAIT TRAINING THERAPY: CPT

## 2022-10-28 PROCEDURE — 2580000003 HC RX 258: Performed by: UROLOGY

## 2022-10-28 PROCEDURE — 1200000000 HC SEMI PRIVATE

## 2022-10-28 PROCEDURE — 7100000000 HC PACU RECOVERY - FIRST 15 MIN: Performed by: UROLOGY

## 2022-10-28 PROCEDURE — 3600000005 HC SURGERY LEVEL 5 BASE: Performed by: UROLOGY

## 2022-10-28 PROCEDURE — 97530 THERAPEUTIC ACTIVITIES: CPT

## 2022-10-28 PROCEDURE — 3700000000 HC ANESTHESIA ATTENDED CARE: Performed by: UROLOGY

## 2022-10-28 PROCEDURE — 3700000001 HC ADD 15 MINUTES (ANESTHESIA): Performed by: UROLOGY

## 2022-10-28 PROCEDURE — 7100000001 HC PACU RECOVERY - ADDTL 15 MIN: Performed by: UROLOGY

## 2022-10-28 PROCEDURE — C1769 GUIDE WIRE: HCPCS | Performed by: UROLOGY

## 2022-10-28 PROCEDURE — 2580000003 HC RX 258

## 2022-10-28 PROCEDURE — 0TCB8ZZ EXTIRPATION OF MATTER FROM BLADDER, VIA NATURAL OR ARTIFICIAL OPENING ENDOSCOPIC: ICD-10-PCS | Performed by: INTERNAL MEDICINE

## 2022-10-28 PROCEDURE — 85025 COMPLETE CBC W/AUTO DIFF WBC: CPT

## 2022-10-28 PROCEDURE — 2580000003 HC RX 258: Performed by: INTERNAL MEDICINE

## 2022-10-28 PROCEDURE — 6360000002 HC RX W HCPCS

## 2022-10-28 PROCEDURE — 97166 OT EVAL MOD COMPLEX 45 MIN: CPT

## 2022-10-28 PROCEDURE — 97162 PT EVAL MOD COMPLEX 30 MIN: CPT

## 2022-10-28 PROCEDURE — 3600000015 HC SURGERY LEVEL 5 ADDTL 15MIN: Performed by: UROLOGY

## 2022-10-28 PROCEDURE — 6370000000 HC RX 637 (ALT 250 FOR IP): Performed by: UROLOGY

## 2022-10-28 PROCEDURE — 2500000003 HC RX 250 WO HCPCS

## 2022-10-28 PROCEDURE — 80053 COMPREHEN METABOLIC PANEL: CPT

## 2022-10-28 PROCEDURE — 6370000000 HC RX 637 (ALT 250 FOR IP): Performed by: INTERNAL MEDICINE

## 2022-10-28 PROCEDURE — 36415 COLL VENOUS BLD VENIPUNCTURE: CPT

## 2022-10-28 PROCEDURE — 2709999900 HC NON-CHARGEABLE SUPPLY: Performed by: UROLOGY

## 2022-10-28 RX ORDER — FENTANYL CITRATE 50 UG/ML
50 INJECTION, SOLUTION INTRAMUSCULAR; INTRAVENOUS EVERY 5 MIN PRN
Status: CANCELLED | OUTPATIENT
Start: 2022-10-28

## 2022-10-28 RX ORDER — LABETALOL HYDROCHLORIDE 5 MG/ML
10 INJECTION, SOLUTION INTRAVENOUS
Status: CANCELLED | OUTPATIENT
Start: 2022-10-28

## 2022-10-28 RX ORDER — HYDRALAZINE HYDROCHLORIDE 20 MG/ML
10 INJECTION INTRAMUSCULAR; INTRAVENOUS
Status: CANCELLED | OUTPATIENT
Start: 2022-10-28

## 2022-10-28 RX ORDER — ONDANSETRON 2 MG/ML
4 INJECTION INTRAMUSCULAR; INTRAVENOUS
Status: CANCELLED | OUTPATIENT
Start: 2022-10-28 | End: 2022-10-29

## 2022-10-28 RX ORDER — SODIUM CHLORIDE 9 MG/ML
INJECTION, SOLUTION INTRAVENOUS PRN
Status: CANCELLED | OUTPATIENT
Start: 2022-10-28

## 2022-10-28 RX ORDER — SODIUM CHLORIDE 0.9 % (FLUSH) 0.9 %
5-40 SYRINGE (ML) INJECTION PRN
Status: CANCELLED | OUTPATIENT
Start: 2022-10-28

## 2022-10-28 RX ORDER — LIDOCAINE HYDROCHLORIDE 20 MG/ML
INJECTION, SOLUTION EPIDURAL; INFILTRATION; INTRACAUDAL; PERINEURAL PRN
Status: DISCONTINUED | OUTPATIENT
Start: 2022-10-28 | End: 2022-10-28 | Stop reason: SDUPTHER

## 2022-10-28 RX ORDER — PROCHLORPERAZINE EDISYLATE 5 MG/ML
5 INJECTION INTRAMUSCULAR; INTRAVENOUS
Status: CANCELLED | OUTPATIENT
Start: 2022-10-28 | End: 2022-10-29

## 2022-10-28 RX ORDER — SODIUM CHLORIDE 9 MG/ML
INJECTION, SOLUTION INTRAVENOUS CONTINUOUS PRN
Status: DISCONTINUED | OUTPATIENT
Start: 2022-10-28 | End: 2022-10-28 | Stop reason: SDUPTHER

## 2022-10-28 RX ORDER — PROPOFOL 10 MG/ML
INJECTION, EMULSION INTRAVENOUS CONTINUOUS PRN
Status: DISCONTINUED | OUTPATIENT
Start: 2022-10-28 | End: 2022-10-28 | Stop reason: SDUPTHER

## 2022-10-28 RX ORDER — FENTANYL CITRATE 50 UG/ML
INJECTION, SOLUTION INTRAMUSCULAR; INTRAVENOUS PRN
Status: DISCONTINUED | OUTPATIENT
Start: 2022-10-28 | End: 2022-10-28 | Stop reason: SDUPTHER

## 2022-10-28 RX ORDER — SODIUM CHLORIDE 0.9 % (FLUSH) 0.9 %
5-40 SYRINGE (ML) INJECTION EVERY 12 HOURS SCHEDULED
Status: CANCELLED | OUTPATIENT
Start: 2022-10-28

## 2022-10-28 RX ORDER — ONDANSETRON 2 MG/ML
INJECTION INTRAMUSCULAR; INTRAVENOUS PRN
Status: DISCONTINUED | OUTPATIENT
Start: 2022-10-28 | End: 2022-10-28 | Stop reason: SDUPTHER

## 2022-10-28 RX ORDER — PROPOFOL 10 MG/ML
INJECTION, EMULSION INTRAVENOUS PRN
Status: DISCONTINUED | OUTPATIENT
Start: 2022-10-28 | End: 2022-10-28 | Stop reason: SDUPTHER

## 2022-10-28 RX ORDER — INSULIN LISPRO 100 [IU]/ML
7 INJECTION, SOLUTION INTRAVENOUS; SUBCUTANEOUS ONCE
Status: COMPLETED | OUTPATIENT
Start: 2022-10-28 | End: 2022-10-28

## 2022-10-28 RX ORDER — DEXAMETHASONE SODIUM PHOSPHATE 4 MG/ML
INJECTION, SOLUTION INTRA-ARTICULAR; INTRALESIONAL; INTRAMUSCULAR; INTRAVENOUS; SOFT TISSUE PRN
Status: DISCONTINUED | OUTPATIENT
Start: 2022-10-28 | End: 2022-10-28 | Stop reason: SDUPTHER

## 2022-10-28 RX ORDER — FENTANYL CITRATE 50 UG/ML
25 INJECTION, SOLUTION INTRAMUSCULAR; INTRAVENOUS EVERY 5 MIN PRN
Status: CANCELLED | OUTPATIENT
Start: 2022-10-28

## 2022-10-28 RX ORDER — MAGNESIUM HYDROXIDE 1200 MG/15ML
LIQUID ORAL
Status: COMPLETED | OUTPATIENT
Start: 2022-10-28 | End: 2022-10-28

## 2022-10-28 RX ADMIN — PROPOFOL 20 MG: 10 INJECTION, EMULSION INTRAVENOUS at 08:42

## 2022-10-28 RX ADMIN — INSULIN LISPRO 7 UNITS: 100 INJECTION, SOLUTION INTRAVENOUS; SUBCUTANEOUS at 17:48

## 2022-10-28 RX ADMIN — ATORVASTATIN CALCIUM 20 MG: 20 TABLET, FILM COATED ORAL at 21:41

## 2022-10-28 RX ADMIN — Medication 3 MG: at 22:55

## 2022-10-28 RX ADMIN — DEXAMETHASONE SODIUM PHOSPHATE 8 MG: 4 INJECTION, SOLUTION INTRAMUSCULAR; INTRAVENOUS at 08:51

## 2022-10-28 RX ADMIN — HYDROCHLOROTHIAZIDE 25 MG: 25 TABLET ORAL at 10:45

## 2022-10-28 RX ADMIN — PROPOFOL 20 MG: 10 INJECTION, EMULSION INTRAVENOUS at 08:37

## 2022-10-28 RX ADMIN — LIDOCAINE HYDROCHLORIDE 80 MG: 20 INJECTION, SOLUTION EPIDURAL; INFILTRATION; INTRACAUDAL; PERINEURAL at 08:36

## 2022-10-28 RX ADMIN — PREDNISONE 5 MG: 5 TABLET ORAL at 10:45

## 2022-10-28 RX ADMIN — ONDANSETRON 4 MG: 2 INJECTION INTRAMUSCULAR; INTRAVENOUS at 08:51

## 2022-10-28 RX ADMIN — PROPOFOL 30 MG: 10 INJECTION, EMULSION INTRAVENOUS at 08:36

## 2022-10-28 RX ADMIN — FENTANYL CITRATE 25 MCG: 50 INJECTION INTRAMUSCULAR; INTRAVENOUS at 08:30

## 2022-10-28 RX ADMIN — SODIUM CHLORIDE, PRESERVATIVE FREE 10 ML: 5 INJECTION INTRAVENOUS at 21:44

## 2022-10-28 RX ADMIN — FENTANYL CITRATE 25 MCG: 50 INJECTION INTRAMUSCULAR; INTRAVENOUS at 08:47

## 2022-10-28 RX ADMIN — PROPOFOL 20 MG: 10 INJECTION, EMULSION INTRAVENOUS at 08:47

## 2022-10-28 RX ADMIN — VALSARTAN 80 MG: 80 TABLET, FILM COATED ORAL at 10:45

## 2022-10-28 RX ADMIN — FENTANYL CITRATE 25 MCG: 50 INJECTION INTRAMUSCULAR; INTRAVENOUS at 08:36

## 2022-10-28 RX ADMIN — INSULIN LISPRO 4 UNITS: 100 INJECTION, SOLUTION INTRAVENOUS; SUBCUTANEOUS at 21:44

## 2022-10-28 RX ADMIN — FENTANYL CITRATE 25 MCG: 50 INJECTION INTRAMUSCULAR; INTRAVENOUS at 08:42

## 2022-10-28 RX ADMIN — SODIUM CHLORIDE: 9 INJECTION, SOLUTION INTRAVENOUS at 08:19

## 2022-10-28 RX ADMIN — CEFAZOLIN 2000 MG: 2 INJECTION, POWDER, FOR SOLUTION INTRAMUSCULAR; INTRAVENOUS at 08:30

## 2022-10-28 RX ADMIN — INSULIN LISPRO 4 UNITS: 100 INJECTION, SOLUTION INTRAVENOUS; SUBCUTANEOUS at 17:48

## 2022-10-28 RX ADMIN — CITALOPRAM HYDROBROMIDE 20 MG: 20 TABLET ORAL at 10:45

## 2022-10-28 RX ADMIN — SODIUM CHLORIDE, PRESERVATIVE FREE 10 ML: 5 INJECTION INTRAVENOUS at 10:46

## 2022-10-28 RX ADMIN — PROPOFOL 75 MCG/KG/MIN: 10 INJECTION, EMULSION INTRAVENOUS at 08:36

## 2022-10-28 ASSESSMENT — PAIN SCALES - GENERAL: PAINLEVEL_OUTOF10: 0

## 2022-10-28 ASSESSMENT — LIFESTYLE VARIABLES: SMOKING_STATUS: 0

## 2022-10-28 ASSESSMENT — PAIN - FUNCTIONAL ASSESSMENT: PAIN_FUNCTIONAL_ASSESSMENT: 0-10

## 2022-10-28 NOTE — PROGRESS NOTES
Hospital Medicine Progress Note     Date:  10/28/2022    PCP: Paige Alexandra MD (Tel: [phone])    Date of Admission: 10/27/2022    Chief complaint:   Chief Complaint   Patient presents with    Hematuria     Pt arrives ambulatory for eval of hematuria onset yesterday, pt sts cardiologist said to stop plavix and aspirin, has had epistaxis a few times recently, denies pain       Brief admission history: 26-year-old male with history of CAD status post PCI (on aspirin and Plavix), well-controlled diabetes type 2, essential hypertension, hyperlipidemia, ankylosing spondylitis (on chronic steroid), who presents to the emergency room with complaints of gross hematuria, onset since 10/26/2022. He oftentimes get epistaxis. He reached out to his cardiologist, who recommended stopping antiplatelet therapies, referred to the emergency room for evaluation. Hemoglobin is stable. CT-abdomen/pelvis reveals 7.1 cm mass in the bladder, favored to represent blood products. Assessment/plan:  Gross hematuria. Possible contributory factors include aspirin and Plavix use. Holding aspirin and Plavix. Abnormal finding on CT-abdomen/pelvis with 7.1 cm mass concerning for possible blood product. He was started on continuous bladder irrigation. Urology has evaluated and patient is status post cystoscopic on 10/28/2022 - evacuation of blood product. Urology recommends holding antiplatelet therapies for 1 week, follow up as outpatient. Recinos catheter removal tomorrow. Will discuss with patient's cardiologist, Dr. Samantha Bhardwaj (number [phone]) prior to discharge. Other comorbidities: history of CAD status post PCI, well-controlled diabetes type 2, essential hypertension, hyperlipidemia, ankylosing spondylitis (on chronic steroid). Diet: Diet NPO Exceptions are: Sips of Water with Meds    Code status: Full Code   ----------  Subjective  No new issues.     Objective  Physical exam:  Vitals: BP (!) 125/57   Pulse 55 Temp 97.7 °F (36.5 °C) (Temporal)   Resp 18   Ht 6' 1\" (1.854 m)   Wt 179 lb 14.3 oz (81.6 kg)   SpO2 93%   BMI 23.73 kg/m²   Gen/overall appearance: Not in acute distress. Alert. Oriented X3  Head: Normocephalic, atraumatic  Eyes: EOMI, good acuity  ENT: Oral mucosa moist  Neck: No JVD, thyromegaly  CVS: Nml S1S2, no MRG, RRR  Pulm: Clear bilaterally. No crackles/wheezes  Gastrointestinal: Soft, NT/ND, +BS  Musculoskeletal: No edema. Warm  Neuro: No focal deficit. Moves extremity spontaneously. Psychiatry: Appropriate affect. Not agitated. Skin: Warm, dry with normal turgor. No rash  Capillary refill: Brisk,< 3 seconds   Peripheral Pulses: +2 palpable, equal bilaterally      24HR INTAKE/OUTPUT:    Intake/Output Summary (Last 24 hours) at 10/28/2022 0804  Last data filed at 10/28/2022 0630  Gross per 24 hour   Intake 120 ml   Output 2250 ml   Net -2130 ml     I/O last 3 completed shifts: In: 120 [P.O.:120]  Out: 2250 [Urine:2250]  No intake/output data recorded.   Meds:    amLODIPine  10 mg Oral Nightly    valsartan  80 mg Oral Daily    citalopram  20 mg Oral Daily    hydroCHLOROthiazide  25 mg Oral Daily    predniSONE  5 mg Oral Daily    atorvastatin  20 mg Oral Nightly    insulin lispro  0-4 Units SubCUTAneous TID WC    insulin lispro  0-4 Units SubCUTAneous Nightly    sodium chloride flush  5-40 mL IntraVENous 2 times per day    metoprolol succinate  25 mg Oral Daily     Infusions:    sodium chloride Stopped (10/27/22 2136)    dextrose      sodium chloride       PRN Meds: opium-belladonna, albuterol, melatonin, glucose, dextrose bolus **OR** dextrose bolus, glucagon (rDNA), dextrose, sodium chloride flush, sodium chloride, ondansetron **OR** ondansetron, polyethylene glycol, acetaminophen **OR** acetaminophen    Labs/imaging:  CBC:    Recent Labs     10/27/22  1607 10/28/22  0620   WBC 9.5 10.5   HGB 14.0 13.8    197      BMP:    Recent Labs     10/27/22  1607 10/28/22  0620    139   K 4.1 4.1   CL 97* 100   CO2 31 28   BUN 27* 23*   CREATININE 1.1 1.1   GLUCOSE 147* 136*     Hepatic:   Recent Labs     10/27/22  1607 10/28/22  0620   AST 14* 10*   ALT 8* 7*   BILITOT 0.4 0.5   ALKPHOS 95 84       Thai Cantrell MD  -------------------------------  [facility]

## 2022-10-28 NOTE — ANESTHESIA POSTPROCEDURE EVALUATION
Department of Anesthesiology  Postprocedure Note    Patient: Adalid Cobb  MRN: [id number]  [place]: 12/24/1930  Date of evaluation: 10/28/2022      Procedure Summary     Date: 10/28/22 Room / Location: [address] [place] [address]    Anesthesia Start: 0830 Anesthesia Stop: 2159    Procedure: CYSTOSCOPY EVACUATION OF CLOTS (Bladder) Diagnosis:       Gross hematuria      (GROSS HEMATURIA)    Surgeons: Reece Lambert MD Responsible Provider: Kaye Marlow MD    Anesthesia Type: MAC ASA Status: 3          Anesthesia Type: MAC    Joshua Phase I: Joshua Score: 10    Joshua Phase II:        Anesthesia Post Evaluation    Patient location during evaluation: PACU  Patient participation: complete - patient participated  Level of consciousness: awake and alert  Airway patency: patent  Nausea & Vomiting: no nausea and no vomiting  Complications: no  Cardiovascular status: hemodynamically stable (Hypertensive in preop)  Respiratory status: spontaneous ventilation and nonlabored ventilation  Hydration status: stable  Comments: Procedure completed uneventfully under MAC. Mr. Stefania Cannon was seen comfortably conversing with staff following procedure. Appropriate for return to floor when room is ready.

## 2022-10-28 NOTE — PROGRESS NOTES
Consult received from ER. 66-year-old male presents with significant gross hematuria, new onset. History of coronary artery disease, on aspirin and Plavix prior to presentation. CT scan shows large clot measuring 7 cm in dependent portion of the bladder. Recinos placed in the emergency room. Discussed with patient. Will need cystoscopy and clot evacuation. Please keep n.p.o. after midnight.   Scheduled tomorrow morning with Dr. Mika Miller

## 2022-10-28 NOTE — OP NOTE
Operative Note      Patient: Marylen Bathe  YOB: 1930  MRN: [id number]    Date of Procedure: 10/28/2022    Pre-Op Diagnosis: GROSS HEMATURIA    Post-Op Diagnosis: Same       Procedure(s):  CYSTOSCOPY EVACUATION OF CLOTS    Surgeon(s):  Maine Bernal MD    Assistant:   Surgical Assistant: Isis Wilson RN    Anesthesia: Monitor Anesthesia Care    Estimated Blood Loss (mL): Minimal    Complications: None    Specimens:   * No specimens in log *    Implants:  * No implants in log *      Drains:   Urinary Catheter 10/28/22 2 Way (Active)       [REMOVED] Urinary Catheter 10/27/22 3 Way (Removed)   Catheter Indications Urinary retention (acute or chronic), continuous bladder irrigation or bladder outlet obstruction 10/27/22 2053   Site Assessment No urethral drainage;Red 10/27/22 2053   Urine Color Burgandy 10/28/22 0630   Urine Appearance Clots 10/27/22 2053   Collection Container Standard 10/27/22 2053   Securement Method Securing device (Describe) 10/27/22 2053   Catheter Best Practices  Bag below bladder;Bag not on floor 10/27/22 1938   Status Draining 10/28/22 0630   Output (mL) 900 mL 10/28/22 0630       Findings:   Large clot burden within bladder  Evidence of enlarged friable prostate likely cause of bleeding in addition to blood thinners    Detailed Description of Procedure: The patient was met in the preoperative area and surgical procedure discussed. All questions and concerns were answered. He was brought back to the operating room where anesthesia was induced. He received preoperative antibiotics according to antibiotic guidelines. Next, he was placed in the dorsal lithotomy position and prepped and draped in the usual sterile fashion. All pressure points were appropriately padded. Next, a time out was performed confirming the correct patient and procedure. The procedure was started by placing a 21F cystoscope per urethra into the patient's bladder.  Pan cystoscopy revealed the above abnormalities. A toumey syringe was then used to remove all clot with in the patient's bladder. This amounted to ~500cc of clot. Cystoscopy was again performed revealing all clot was removed and no gross abnormalities within the bladder. This prostate appeared enlarged and hypervascular with several sites of venous oozing. A bugbee electrode was used to cauterize all areas of bleeding within the prostatic urethra. Excellent hemostasis was achieved. A 18F valle catheter was then placed and irrigated with clear yellow urine in return. His bladder was then drained and he was awoken from anesthesia. He will recover on the floor. If his urine remains clear his catheter can be removed tomorrow. He should remain off his blood thinners for the next week or as long as safely allowed per his primary team. He can follow up with urology electively thereafter.         Electronically signed by Concepcion Celaya MD on 10/28/2022 at 9:10 AM

## 2022-10-28 NOTE — ANESTHESIA PRE PROCEDURE
Department of Anesthesiology  Preprocedure Note       Name:  Adalid Cobb   Age:  80 y.o.  :  1930                                          MRN:  [id number]         Date:  10/28/2022      Surgeon: Monica Li):  Reece Lambert MD    Procedure: Procedure(s):  CYSTOSCOPY EVACUATION OF CLOTS    Medications prior to admission:   Prior to Admission medications    Medication Sig Start Date End Date Taking?  Authorizing Provider   acetaminophen (TYLENOL) 325 MG tablet Take 650 mg by mouth every 6 hours as needed for Pain   Yes Historical Provider, MD   metoprolol succinate (TOPROL XL) 25 MG extended release tablet Take 1 tablet by mouth daily 22   Historical Provider, MD   clopidogrel (PLAVIX) 75 MG tablet Take 1 tablet by mouth daily 21   Historical Provider, MD   candesartan (ATACAND) 32 MG tablet Take 32 mg by mouth nightly    Historical Provider, MD   predniSONE (DELTASONE) 5 MG tablet Take 1 tablet by mouth daily For shoulder pain    Historical Provider, MD   metFORMIN, OSM, (FORTAMET) 1000 MG extended release tablet Take 1,000 mg by mouth daily (with breakfast)    Historical Provider, MD   amLODIPine (NORVASC) 10 MG tablet Take 10 mg by mouth nightly     Historical Provider, MD   hydrochlorothiazide (HYDRODIURIL) 25 MG tablet Take 25 mg by mouth daily    Historical Provider, MD   citalopram (CELEXA) 20 MG tablet Take 20 mg by mouth daily    Historical Provider, MD   simvastatin (ZOCOR) 80 MG tablet Take 80 mg by mouth nightly    Historical Provider, MD   albuterol (PROVENTIL) (2.5 MG/3ML) 0.083% nebulizer solution Take 2.5 mg by nebulization 2 times daily as needed for Wheezing    Historical Provider, MD   melatonin 3 MG TABS tablet Take 3 mg by mouth nightly as needed    Historical Provider, MD       Current medications:    Current Facility-Administered Medications   Medication Dose Route Frequency Provider Last Rate Last Admin    sodium chloride 0.9 % irrigation 1,000 mL  1,000 mL Irrigation Continuous Riley Jamison MD   Held at 10/27/22 2136    opium-belladonna (B&O SUPPRETTES) 16.2-60 MG suppository 60 mg  60 mg Rectal Q8H PRN Riley Jamison MD        albuterol (PROVENTIL) nebulizer solution 2.5 mg  2.5 mg Nebulization Q6H PRN Riley Jamison MD        amLODIPine (NORVASC) tablet 10 mg  10 mg Oral Nightly Riley Jamison MD   10 mg at 10/27/22 2130    valsartan (DIOVAN) tablet 80 mg  80 mg Oral Daily iRley Jamison MD        citalopram (CELEXA) tablet 20 mg  20 mg Oral Daily Riley Jamison MD        hydroCHLOROthiazide (HYDRODIURIL) tablet 25 mg  25 mg Oral Daily Riley Jamison MD        melatonin tablet 3 mg  3 mg Oral Nightly PRN Riley Jamison MD   3 mg at 10/27/22 2130    predniSONE (DELTASONE) tablet 5 mg  5 mg Oral Daily Riley Jamison MD        atorvastatin (LIPITOR) tablet 20 mg  20 mg Oral Nightly Riley Jamison MD   20 mg at 10/27/22 2130    glucose chewable tablet 16 g  4 tablet Oral PRN Riley Jamison MD        dextrose bolus 10% 125 mL  125 mL IntraVENous PRN Riley Jamison MD        Or    dextrose bolus 10% 250 mL  250 mL IntraVENous PRN Riley Jamison MD        glucagon (rDNA) injection 1 mg  1 mg SubCUTAneous PRN Riley Jamison MD        dextrose 10 % infusion   IntraVENous Continuous PRN Riley Jamison MD        insulin lispro (HUMALOG) injection vial 0-4 Units  0-4 Units SubCUTAneous TID  Esdras Thorne MD        insulin lispro (HUMALOG) injection vial 0-4 Units  0-4 Units SubCUTAneous Nightly Riley Jamison MD        sodium chloride flush 0.9 % injection 5-40 mL  5-40 mL IntraVENous 2 times per day Riley Jamison MD   10 mL at 10/27/22 2137    sodium chloride flush 0.9 % injection 10 mL  10 mL IntraVENous PRN Esdras Thorne MD        0.9 % sodium chloride infusion   IntraVENous PRN Riley Jamison MD        ondansetron (ZOFRAN-ODT) disintegrating tablet 4 mg  4 mg Oral Q8H PRN Riley Jamison MD Or    ondansetron (ZOFRAN) injection 4 mg  4 mg IntraVENous Q6H PRN Cody Urbina MD        polyethylene glycol (GLYCOLAX) packet 17 g  17 g Oral Daily PRN Cody Urbina MD        acetaminophen (TYLENOL) tablet 650 mg  650 mg Oral Q6H PRN Cody Urbina MD        Or    acetaminophen (TYLENOL) suppository 650 mg  650 mg Rectal Q6H PRN Cody Urbina MD        metoprolol succinate (TOPROL XL) extended release tablet 25 mg  25 mg Oral Daily Mirian Jiang, APRN - CNP           Allergies:     Allergies   Allergen Reactions    Penicillins Hives       Problem List:    Patient Active Problem List   Diagnosis Code    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, init ([facility] [place] [facility] S14.129A    Fall against object W18.00XA    1st degree AV block I44.0    Gross hematuria R31.0       Past Medical History:        Diagnosis Date    [facility]ylosing spondylitis ([facility]     Basal cell carcinoma     Diabetes mellitus type II, controlled ([facility] [place] [facility]     Hyperlipidemia     Hypertension     Varicella        Past Surgical History:        Procedure Laterality Date    CERVICAL FUSION      TURP         Social History:    Social History     Tobacco Use    Smoking status: Never    Smokeless tobacco: Never   Substance Use Topics    Alcohol use: Not Currently                                Counseling given: Not Answered      Vital Signs (Current):   Vitals:    10/27/22 2000 10/27/22 2103 10/28/22 0630 10/28/22 0737   BP: (!) 123/55 138/60 (!) 149/70 (!) 125/57   Pulse: 59 57 55 55   Resp: 18 18 18 18   Temp:  98 °F (36.7 °C) 98.2 °F (36.8 °C) 97.7 °F (36.5 °C)   TempSrc:  Oral Oral Temporal   SpO2: 98% 96% 94% 93%   Weight:  180 lb 8.9 oz (81.9 kg) 179 lb 14.3 oz (81.6 kg)    Height:  6' 1\" (1.854 m)                                                BP Readings from Last 3 Encounters:   10/28/22 (!) 125/57   12/25/21 (!) 142/64   07/20/19 (!) 113/57       NPO Status: Time of last liquid consumption: 2230 Time of last solid consumption: 1800                        Date of last liquid consumption: 10/27/22 (2230)                        Date of last solid food consumption: 10/27/22    BMI:   Wt Readings from Last 3 Encounters:   10/28/22 179 lb 14.3 oz (81.6 kg)   12/25/21 180 lb 12.4 oz (82 kg)   07/20/19 184 lb 8.4 oz (83.7 kg)     Body mass index is 23.73 kg/m².     CBC:   Lab Results   Component Value Date/Time    WBC 9.5 10/27/2022 04:07 PM    RBC 4.52 10/27/2022 04:07 PM    HGB 14.0 10/27/2022 04:07 PM    HCT 41.9 10/27/2022 04:07 PM    MCV 92.6 10/27/2022 04:07 PM    RDW 13.1 10/27/2022 04:07 PM     10/27/2022 04:07 PM       CMP:   Lab Results   Component Value Date/Time     10/28/2022 06:20 AM    K 4.1 10/28/2022 06:20 AM     10/28/2022 06:20 AM    CO2 28 10/28/2022 06:20 AM    BUN 23 10/28/2022 06:20 AM    CREATININE 1.1 10/28/2022 06:20 AM    GFRAA >60 05/11/2022 10:10 AM    GFRAA >60 07/17/2012 09:46 AM    AGRATIO 2.4 10/28/2022 06:20 AM    LABGLOM >60 10/28/2022 06:20 AM    GLUCOSE 136 10/28/2022 06:20 AM    PROT 5.7 10/28/2022 06:20 AM    CALCIUM 9.2 10/28/2022 06:20 AM    BILITOT 0.5 10/28/2022 06:20 AM    ALKPHOS 84 10/28/2022 06:20 AM    AST 10 10/28/2022 06:20 AM    ALT 7 10/28/2022 06:20 AM       POC Tests:   Recent Labs     10/28/22  0736   POCGLU 147*       Coags:   Lab Results   Component Value Date/Time    PROTIME 13.6 10/27/2022 04:07 PM    INR 1.04 10/27/2022 04:07 PM       HCG (If Applicable): No results found for: PREGTESTUR, PREGSERUM, HCG, HCGQUANT     ABGs: No results found for: PHART, PO2ART, WCX7YCD, BXY4MCZ, BEART, U4TZGIFB     Type & Screen (If Applicable):  No results found for: LABABO, LABRH    Drug/Infectious Status (If Applicable):  No results found for: HIV, HEPCAB    COVID-19 Screening (If Applicable):   Lab Results   Component Value Date/Time    COVID19 NOT DETECTED 08/10/2020 11:46 AM           Anesthesia Evaluation   no history of anesthetic complications: Airway: Mallampati: II  TM distance: >3 FB   Neck ROM: limited  Comment: Essentially zero neck mobility     Dental:      Comment: Denies loose teeth    Pulmonary:   (+) asthma:     (-) rhonchi, wheezes, rales and not a current smoker                           Cardiovascular:  Exercise tolerance: good (>4 METS),   (+) hypertension:, pacemaker (sees [facility]): AICD and pacemaker, past MI:, CAD:, CABG/stent (s/p PCIx2):, dysrhythmias (s/p ablation): atrial fibrillation, hyperlipidemia    (-) orthopnea,  FREEMAN and peripheral edema      Rhythm: regular  Rate: normal           Beta Blocker:  Dose within 24 Hrs (metoprolol)      ROS comment: EKG:  Sinus bradycardia with 1st degree A-V block  Otherwise normal ECG    s/p Watchman    Echocardiogram:  Conclusions   - Left atrium: There is no evidence of a thrombus in the atrial cavity or appendage. There is a 27mm watchman device occluding the left atrial appendage that is protruding approximately 1.5 cm in the LA cavity. There is a small mobile filamentous structure attached to the superior aspect of the watchman device that may represent a thrombus or fibrin (image 68 and 72). - Atrial septum: There was increased thickness of the septum, consistent with lipomatous hypertrophy. There was a possible, very small residual atrial shunt post-transseptal puncture for watchman device placement. - Left ventricle: The cavity size is normal. Systolic function was normal.   - Aortic valve: There is no evidence of a vegetation. Mild regurgitation originating from the central coaptation point and directed centrally in the LVOT. - Mitral valve: There is no evidence of a vegetation.    - Right atrium: There is no evidence of a thrombus in the atrial cavity or appendage.   - Tricuspid valve: There is no evidence of a vegetation.    - Pulmonic valve: There is no evidence of a vegetation. Neuro/Psych:      (-) seizures and headaches            ROS comment:  Ankylosing spondylitis s/p cervical fusion  Central cord syndrome GI/Hepatic/Renal:   (+) renal disease (SCr: 1.1-1.3):,      (-) liver disease, bowel prep and no morbid obesity      ROS comment: Gross hematuria, recurrent    1. Heterogeneous 7.1 cm mass at the dependent aspect the bladder favored to represent blood products. 2. No acute findings elsewhere in the abdomen or pelvis. 3. Prominent prostatomegaly. 4. Moderate stool in the colon and distension of the rectum suggesting constipation. 5. Colonic diverticulosis. Endo/Other:    (+) DiabetesType II DM, , blood dyscrasia (DAPT): anticoagulation therapy, arthritis:., .                  ROS comment: Chronic corticosteroid use Abdominal:         (-) obese Abdomen: soft. Vascular: Other Findings: Very plesant          Anesthesia Plan      MAC     ASA 3     (NPO appropriate. Denies active nausea / reflux. Plan MAC, possible conversion to general discussed. Patient's questions answered. Verbalized understanding of plan of care and wishes to proceed with planned procedure. )        Anesthetic plan and risks discussed with patient. Plan discussed with CRNA. This pre-anesthesia assessment may be used as a history and physical.    DOS STAFF ADDENDUM:    Pt seen and examined, chart reviewed (including anesthesia, drug and allergy history). No interval changes to history and physical examination. Anesthetic plan, risks, benefits, alternatives, and personnel involved discussed with patient. Patient verbalized an understanding and agrees to proceed.       Beck Vinson MD  October 28, 2022  8:07 AM

## 2022-10-28 NOTE — INTERVAL H&P NOTE
Update History & Physical    The patient's History and Physical was reviewed with the patient and I examined the patient. There was no change. The surgical site was confirmed by the patient and me. Plan: The risks, benefits, expected outcome, and alternative to the recommended procedure have been discussed with the patient. Patient understands and wants to proceed with the procedure.      Electronically signed by Chinedu Cadet MD on 10/28/2022 at 7:19 AM
No

## 2022-10-28 NOTE — CARE COORDINATION
INITIAL CASE MANAGEMENT ASSESSMENT    Reviewed chart, met with patient to assess possible discharge needs. Explained Case Management role/services. Living Situation: Lives with his wife in a one story home with a basement with 5 steps to enter with rails. ADLs: Independent     DME: shower chair, grab bars    PT/OT Recs: None     Active Services: None     Transportation: Active /Wife will transport     Medications: [facility] on [address]/No barriers    PCP:  Kalpana Barnes MD      HD/PD: N/A    PLAN/COMMENTS: Plan is home with wife. SW/CM provided contact information for patient or family to call with any questions. SW/CM will follow and assist as needed.    Electronically signed by Kami Dye RN on 10/28/2022 at 12:00 PM

## 2022-10-28 NOTE — PROGRESS NOTES
Patient arrived to 4116 via stretcher and ambulated to bed without difficulty. Patient is A/Ox4, denies pain, valle draining dark red urine. Spoke with Dr Arsalan Jack regarding plan tomorrow for clot evac. Ok to manually flush valle if large clots are present, and ok to remove valle if it becomes an issue.

## 2022-10-28 NOTE — PROGRESS NOTES
Physical Therapy  Facility/Department: [facility] SURG  Physical Therapy Initial Assessment    Name: Adam Hawkins  : 1930  MRN: [id number]  Date of Service: 10/28/2022    Discharge Recommendations:  Home with assist PRN   PT Equipment Recommendations  Equipment Needed: No    Adam Hawkins scored a 23/24 on the AM-PAC short mobility form. At this time, no further PT is recommended upon discharge due to being close to functional baseline. Recommend patient returns to prior setting with prior services. Patient Diagnosis(es): The encounter diagnosis was Gross hematuria. Past Medical History:  has a past medical history of Ankylosing spondylitis ([facility] [address] Basal cell carcinoma, Diabetes mellitus type II, controlled ([facility] [address] Hyperlipidemia, Hypertension, and Varicella. Past Surgical History:  has a past surgical history that includes TURP; cervical fusion; and Cystoscopy (N/A, 10/28/2022). Assessment   Body Structures, Functions, Activity Limitations Requiring Skilled Therapeutic Intervention: Decreased functional mobility   Assessment: Pt is a 81 y/o male who presented to the ED with gross hematuria. Pt very indep at baseline, ambulating without an AD, and assisting his wife as needed at home. Today, pt close to functional baseline and required supervision for transfers and community distance ambulation without an AD. Anticipate that pt will be safe to d/c home with assist PRN and no further PT once discharged. Will continue to see while hospitalized to prevent debility.   Treatment Diagnosis: functional mobility slightly below baseline  Therapy Prognosis: Excellent  Decision Making: Medium Complexity  History: Per Dawn Goodwin MD \"This is a pleasant 80 y.o. male with history of CAD status post PCI (on aspirin and Plavix), well-controlled diabetes type 2, essential hypertension, hyperlipidemia, ankylosing spondylitis (on chronic steroid), who presents to the emergency room with complaints of gross hematuria, onset since 10/26/2022. He oftentimes get epistaxis. He reached out to his cardiologist, who recommended stopping antiplatelet therapies, referred to the emergency room for evaluation. Hemoglobin is stable. CT-abdomen/pelvis reveals 7.1 cm mass in the bladder, favored to represent blood products. Urologist recommends admission with plan for further evaluation. \"  Exam: functional mobility, ROM, strength  Clinical Presentation: evolving  Barriers to Learning: none  Requires PT Follow-Up: Yes  Activity Tolerance  Activity Tolerance: Patient tolerated evaluation without incident     Plan   Physcial Therapy Plan  General Plan: 3-5 times per week  Current Treatment Recommendations: Functional mobility training, Balance training, Stair training, Patient/Caregiver education & training, Safety education & training  Safety Devices  Type of Devices: Call light within reach, Chair alarm in place, Gait belt, Left in chair, Nurse notified  Restraints  Restraints Initially in Place: No     Restrictions  Restrictions/Precautions  Restrictions/Precautions: Fall Risk  Position Activity Restriction  Other position/activity restrictions: valle     Subjective   General  Chart Reviewed: Yes  Patient assessed for rehabilitation services?: Yes  Additional Pertinent Hx: Per Yaneth Ulloa MD \"This is a pleasant 80 y.o. male with history of CAD status post PCI (on aspirin and Plavix), well-controlled diabetes type 2, essential hypertension, hyperlipidemia, ankylosing spondylitis (on chronic steroid), who presents to the emergency room with complaints of gross hematuria, onset since 10/26/2022. He oftentimes get epistaxis. He reached out to his cardiologist, who recommended stopping antiplatelet therapies, referred to the emergency room for evaluation. Hemoglobin is stable. CT-abdomen/pelvis reveals 7.1 cm mass in the bladder, favored to represent blood products.   Urologist recommends admission with plan for further evaluation. \"  Response To Previous Treatment: Not applicable  Family / Caregiver Present: No  Referring Practitioner: Ihsan Temple MD  Referral Date : 10/27/22  Diagnosis: Hematuria  Follows Commands: Within Functional Limits  Subjective  Subjective: Pt very pleasant and agreeable to PT eval and treat. Denies pain. In bed upon arrival.         Social/Functional History  Social/Functional History  Lives With: Spouse ((Debra Castillo)- has \"a walking disbility-- transverse myelitis. She uses forearm crutches or a walker. \" Pt typically helps his wife with retrieving items around the house)  Type of Home: House  Home Layout: Two level, Performs ADL's on one level, Work area in basement (railing on right if descending)  Home Access: Stairs to enter with rails  Entrance Stairs - Number of Steps: a lot, half a flight to the deck, then a few steps rising up. There's an abundance of railings on both side, all over the place. \" ~11 steps (broken up), railings placed for wife  Entrance Stairs - Rails: Both  Bathroom Shower/Tub: Walk-in shower, Shower chair with back  Bathroom Toilet: Handicap height (vanity next to toilet)  Bathroom Equipment: Grab bars in shower, Shower chair  Home Equipment:  (multiple walkers)  Has the patient had two or more falls in the past year or any fall with injury in the past year?: No  ADL Assistance: [address] [place] [address] Avenue: Independent (pt does most meals, wife preps as able)  Ambulation Assistance: Independent (no AD)  Transfer Assistance: Independent  Active : Yes  Mode of Transportation: Car  Leisure & Hobbies: volunteer work, graphics and writing on computer    [address]: Impaired  Vision Exceptions: Wears glasses for reading  Hearing  Hearing: Exceptions to [facility]  Hearing Exceptions: Hard of hearing/hearing concerns      Cognition   Orientation  Overall Orientation Status: Within Functional Limits     Objective      Gross Assessment  Sensation: Intact     AROM RLE (degrees)  RLE AROM: WFL  AROM LLE (degrees)  LLE AROM : WFL  Strength RLE  Strength RLE: WFL  Strength LLE  Strength LLE: WFL        Bed mobility  Supine to Sit: Supervision (HOB elevated)  Sit to Supine: Unable to assess (up in chair at end of session)    Transfers  Sit to Stand: Supervision  Stand to Sit: Supervision    Ambulation  Surface: Level tile  Device: No Device  Other Apparatus:  (therapist managed valle)  Assistance: Supervision  Quality of Gait: steady without AD, rupinder improved with distance, occasional path deviation  Gait Deviations: Decreased head and trunk rotation;Decreased step height  Distance: approx 250' with multiple turns; 25' toilet to recliner  More Ambulation?: No  Stairs/Curb  Stairs?: No     Balance  Posture: Fair  Sitting - Static: Good  Sitting - Dynamic: Good  Standing - Static: Good  Standing - Dynamic: Good    Exercise Treatment: pt encouraged to complete ankle pumps throughout the day      AM-PAC Score  AM-PAC Inpatient Mobility Raw Score : 23 (10/28/22 1341)  AM-PAC Inpatient T-Scale Score : 56.93 (10/28/22 1341)  Mobility Inpatient CMS 0-100% Score: 11.2 (10/28/22 1341)  Mobility Inpatient CMS G-Code Modifier : CI (10/28/22 1341)           Goals  Short Term Goals  Time Frame for Short Term Goals: upon d/c  Short Term Goal 1: bed mobility indep  Short Term Goal 2: transfers mod I  Short Term Goal 3: ambulate 250' indep  Patient Goals   Patient Goals : \"to go home\"       Education  Patient Education  Education Given To: Patient  Education Provided: Role of Therapy  Education Provided Comments: use of call light for OOB  Education Method: Verbal  Barriers to Learning: None  Education Outcome: Verbalized understanding      Therapy Time   Individual Concurrent Group Co-treatment   Time In 1310         Time Out 1348         Minutes 38         Timed Code Treatment Minutes: 23 Minutes       Electronically signed by Sam Reed, PT [id number] on 10/28/2022 at 1:48 PM

## 2022-10-28 NOTE — ACP (ADVANCE CARE PLANNING)
Advance Care Planning     Advance Care Planning Activator (Inpatient)  Conversation Note      Date of ACP Conversation: 10/28/2022     Conversation Conducted with: Patient with Decision Making Capacity    ACP Activator: Delphine Carolina RN    Health Care Decision Maker:     Current Designated Health Care Decision Maker:     Primary Decision Maker: Pierre Lara Spouse - [phone]    Care Preferences    Ventilation: \"If you were in your present state of health and suddenly became very ill and were unable to breathe on your own, what would your preference be about the use of a ventilator (breathing machine) if it were available to you? \"      Would the patient desire the use of ventilator (breathing machine)?: yes    \"If your health worsens and it becomes clear that your chance of recovery is unlikely, what would your preference be about the use of a ventilator (breathing machine) if it were available to you? \"     Would the patient desire the use of ventilator (breathing machine)?: No      Resuscitation  \"CPR works best to restart the heart when there is a sudden event, like a heart attack, in someone who is otherwise healthy. Unfortunately, CPR does not typically restart the heart for people who have serious health conditions or who are very sick. \"    \"In the event your heart stopped as a result of an underlying serious health condition, would you want attempts to be made to restart your heart (answer \"yes\" for attempt to resuscitate) or would you prefer a natural death (answer \"no\" for do not attempt to resuscitate)? \" yes       [] Yes   [x] No   Educated Patient / Raven Grooms regarding differences between Advance Directives and portable DNR orders.     Length of ACP Conversation in minutes:      Conversation Outcomes:  [x] ACP discussion completed  [] Existing advance directive reviewed with patient; no changes to patient's previously recorded wishes  [] New Advance Directive completed  [] Portable Do Not Rescitate prepared for Provider review and signature  [] POLST/POST/MOLST/MOST prepared for Provider review and signature      Follow-up plan:    [] Schedule follow-up conversation to continue planning  [] Referred individual to Provider for additional questions/concerns   [] Advised patient/agent/surrogate to review completed ACP document and update if needed with changes in condition, patient preferences or care setting    [x] This note routed to one or more involved healthcare providers

## 2022-10-28 NOTE — PROGRESS NOTES
Pt returned from PACU. VSS. Alert and oriented. Pt denies pain. Recinos in place, draining clear pink/red urine. No new signed and held transfer order. Notified Tosin Toure in PACU. Will continue to monitor.

## 2022-10-28 NOTE — PROGRESS NOTES
4 Eyes Skin Assessment     NAME:  Zafar Campos  YOB: 1930  MEDICAL RECORD NUMBER:  [id number]    The patient is being assess for  Admission    I agree that 2 RN's have performed a thorough Head to Toe Skin Assessment on the patient. ALL assessment sites listed below have been assessed. Areas assessed by both nurses:    Head, Face, Ears, Shoulders, Back, Chest, and Arms, Elbows, Hands        Does the Patient have a Wound?  No noted wound(s)       [place] Prevention initiated:  No   Wound Care Orders initiated:  No    Pressure Injury (Stage 3,4, Unstageable, DTI, NWPT, and Complex wounds) if present place referral/consult order under [de-identified] No    New and Established Ostomies if present place consult order under : No      Nurse 1 eSignature: Electronically signed by Katherin Caraballo RN on 10/28/22 at 5:58 AM EDT    **SHARE this note so that the co-signing nurse is able to place an eSignature**    Nurse 2 eSignature: Electronically signed by Catherine James RN on 10/28/22 at 5:58 AM EDT

## 2022-10-28 NOTE — PROGRESS NOTES
Physical Therapy  PT attempt  Archana Factor  Received orders for PT eval and treat. Pt off the floor for procedure. Will attempt again as schedule permits.     Electronically signed by Elodia Ellis on 10/28/2022 at 8:17 AM

## 2022-10-28 NOTE — PROGRESS NOTES
Fadia 437 to PACU. VSS. IV and valle patent. NVPS-0. Oral airway inplace. [facility] PACU recovery complete. A/Ox4. VSS. On room air. IV patent. Denies pain. Valle draining.

## 2022-10-28 NOTE — PROGRESS NOTES
Occupational Therapy  Facility/Department: [facility] MED SURG  Occupational Therapy Initial Assessment    Name: Meera Mcmanus  : 1930  MRN: [id number]  Date of Service: 10/28/2022    Discharge Recommendations:  Home with assist PRN  OT Equipment Recommendations  Equipment Needed: No     Meera Mcmanus scored a 21/24 on the -[facility] ADL Inpatient form. At this time, no further OT is recommended upon discharge due to pt functioning near baseline. Recommend patient returns to prior setting with prior services. Patient Diagnosis(es): The encounter diagnosis was Gross hematuria. Past Medical History:  has a past medical history of Ankylosing spondylitis ([facility] [address] Basal cell carcinoma, Diabetes mellitus type II, controlled ([facility] [address] Hyperlipidemia, Hypertension, and Varicella. Past Surgical History:  has a past surgical history that includes TURP; cervical fusion; and Cystoscopy (N/A, 10/28/2022). Assessment   Performance deficits / Impairments: Decreased high-level IADLs;Decreased functional mobility ; Decreased ADL status  Assessment: Pt is a 80 y.o. male admitted with Gross hematuria. PTA, pt living with wife and independent ADLs, IADLs, and fxl mobility with no AD. Pt currently functioning near baseline, but will continue to see on acute to address the above limitations and maximize pt's safety and independence. Anticipate pt will be safe to return home with assist prn at d/c. Do not anticipate need for home OT.   Prognosis: Good  Decision Making: Low Complexity  History: see above  REQUIRES OT FOLLOW-UP: Yes  Activity Tolerance  Activity Tolerance: Patient Tolerated treatment well        Plan   Occupational Therapy Plan  Times Per Week: 3-5  Current Treatment Recommendations: Balance training, Functional mobility training, Endurance training, Self-Care / ADL, Safety education & training     Restrictions  Restrictions/Precautions  Restrictions/Precautions: Fall Risk  Position Activity Restriction  Other position/activity restrictions: valle    Subjective   General  Chart Reviewed: Yes  Patient assessed for rehabilitation services?: Yes  Additional Pertinent Hx: Per Cody Urbina MD's H&P: \"80year-old male with history of CAD status post PCI (on aspirin and Plavix), well-controlled diabetes type 2, essential hypertension, hyperlipidemia, ankylosing spondylitis (on chronic steroid), who presents to the emergency room with complaints of gross hematuria, onset since 10/26/2022. He oftentimes get epistaxis. He reached out to his cardiologist, who recommended stopping antiplatelet therapies, referred to the emergency room for evaluation. Hemoglobin is stable. CT-abdomen/pelvis reveals 7.1 cm mass in the bladder, favored to represent blood products. \"  Family / Caregiver Present: No  Referring Practitioner: Silverio Smith MD  Diagnosis: Gross hematuria  Subjective  Subjective: Pt met b/s for OT eval/tx. Pt in bed on arrival, agreeable to participate in therapy. Pt denies pain. Social/Functional History  Social/Functional History  Lives With: Spouse ((Remy Kramer)- has \"a walking disbility-- transverse myelitis. She uses forearm crutches or a walker. \" Pt typically helps his wife with retrieving items around the house)  Type of Home: House  Home Layout: Two level, Performs ADL's on one level, Work area in basement (railing on right if descending)  Home Access: Stairs to enter with rails  Entrance Stairs - Number of Steps: a lot, half a flight to the deck, then a few steps rising up. There's an abundance of railings on both side, all over the place. \" ~11 steps (broken up), railings placed for wife  Entrance Stairs - Rails: Both  Bathroom Shower/Tub: Walk-in shower, Shower chair with back  Bathroom Toilet: Handicap height (vanity next to toilet)  Bathroom Equipment: Grab bars in shower, Shower chair  Home Equipment:  (multiple walkers)  Has the patient had two or more falls in the past year or any fall with injury in the past year?: No  ADL Assistance: Independent  Homemaking Assistance: Independent (pt does most meals, wife preps as able)  Ambulation Assistance: Independent (no AD)  Transfer Assistance: Independent  Active : Yes  Mode of Transportation: Car  Leisure & Hobbies: volunteer work, graphics and writing on computer       Objective     Safety Devices  Type of Devices: Call light within reach; Chair alarm in place;Gait belt;Left in chair;Nurse notified  Restraints  Restraints Initially in Place: No    Balance  Sitting: Intact  Standing: Intact (supervision no AD)  Gait  Overall Level of Assistance: Supervision (Pt completed fxl mobility ~250 ft in hallway, to/from BR in room with no AD and supervision. No LOB.)    Transfers  Sit to stand: Supervision  Stand to sit: Supervision  Toilet Transfers  Toilet - Technique: Ambulating  Equipment Used: Grab bars  Toilet Transfer: Supervision    AROM: Within functional limits  PROM: Within functional limits  Strength: Within functional limits  Coordination: Within functional limits  Tone: Normal  Sensation: Intact (pt denies N/T in demetra hands)    ADL  Toileting Skilled Clinical Factors: catheter  Additional Comments: Anticipate pt is independent feeding, supervision bathing, dressing, and toileting based on ROM/strength, balance, endurance.     Activity Tolerance  Activity Tolerance: Patient tolerated evaluation without incident    Bed mobility  Supine to Sit: Supervision (HOB elevated)  Sit to Supine: Unable to assess (up in chair at end of session)    Vision  Vision: Impaired  Vision Exceptions: Wears glasses for reading  Hearing  Hearing: Exceptions to [facility]  Hearing Exceptions: Hard of hearing/hearing concerns    Cognition  Overall Cognitive Status: WFL  Orientation  Overall Orientation Status: Within Functional Limits    Education Given To: Patient  Education Provided: Role of Therapy;Plan of Care  Barriers to Learning: None  Education Outcome: Verbalized understanding;Demonstrated understanding                                   AM-PAC Score        AM-PAC Inpatient Daily Activity Raw Score: 21 (10/28/22 1347)  AM-PAC Inpatient ADL T-Scale Score : 44.27 (10/28/22 1347)  ADL Inpatient CMS 0-100% Score: 32.79 (10/28/22 1347)  ADL Inpatient CMS G-Code Modifier : CJ (10/28/22 1347)           Goals  Short Term Goals  Time Frame for Short Term Goals: Prior to d/c:  Short Term Goal 1: Pt will bathe with mod I. Short Term Goal 2: Pt will dress independently. Short Term Goal 3: Pt will toilet with independently. Short Term Goal 4: Pt will complete fxl mobility and fxl transfers to/from ADL surfaces with mod I. Short Term Goal 5: Pt will complete item retrieval/transport in prep for ADL/IADL task independently. Long Term Goals  Time Frame for Long Term Goals : STGs=LTGs  Patient Goals   Patient goals : to return home.        Therapy Time   Individual Concurrent Group Co-treatment   Time In 1310         Time Out 1348         Minutes 38         Timed Code Treatment Minutes: [address] [facility], OTR/L 7713

## 2022-10-29 VITALS
OXYGEN SATURATION: 94 % | SYSTOLIC BLOOD PRESSURE: 141 MMHG | TEMPERATURE: 97.3 F | HEIGHT: 73 IN | HEART RATE: 57 BPM | BODY MASS INDEX: 23.73 KG/M2 | RESPIRATION RATE: 18 BRPM | DIASTOLIC BLOOD PRESSURE: 73 MMHG | WEIGHT: 179.01 LBS

## 2022-10-29 LAB
A/G RATIO: 1.8 (ref 1.1–2.2)
ALBUMIN SERPL-MCNC: 3.9 G/DL (ref 3.4–5)
ALP BLD-CCNC: 70 U/L (ref 40–129)
ALT SERPL-CCNC: <5 U/L (ref 10–40)
ANION GAP SERPL CALCULATED.3IONS-SCNC: 11 MMOL/L (ref 3–16)
AST SERPL-CCNC: 9 U/L (ref 15–37)
BASOPHILS ABSOLUTE: 0 K/UL (ref 0–0.2)
BASOPHILS RELATIVE PERCENT: 0.1 %
BILIRUB SERPL-MCNC: 0.4 MG/DL (ref 0–1)
BUN BLDV-MCNC: 24 MG/DL (ref 7–20)
CALCIUM SERPL-MCNC: 9 MG/DL (ref 8.3–10.6)
CHLORIDE BLD-SCNC: 98 MMOL/L (ref 99–110)
CO2: 25 MMOL/L (ref 21–32)
CREAT SERPL-MCNC: 1.2 MG/DL (ref 0.8–1.3)
EOSINOPHILS ABSOLUTE: 0 K/UL (ref 0–0.6)
EOSINOPHILS RELATIVE PERCENT: 0 %
GFR SERPL CREATININE-BSD FRML MDRD: 57 ML/MIN/{1.73_M2}
GLUCOSE BLD-MCNC: 173 MG/DL (ref 70–99)
GLUCOSE BLD-MCNC: 197 MG/DL (ref 70–99)
GLUCOSE BLD-MCNC: 222 MG/DL (ref 70–99)
HCT VFR BLD CALC: 37.1 % (ref 40.5–52.5)
HEMOGLOBIN: 12.9 G/DL (ref 13.5–17.5)
LYMPHOCYTES ABSOLUTE: 1.1 K/UL (ref 1–5.1)
LYMPHOCYTES RELATIVE PERCENT: 6.7 %
MCH RBC QN AUTO: 31 PG (ref 26–34)
MCHC RBC AUTO-ENTMCNC: 34.8 G/DL (ref 31–36)
MCV RBC AUTO: 88.9 FL (ref 80–100)
MONOCYTES ABSOLUTE: 0.8 K/UL (ref 0–1.3)
MONOCYTES RELATIVE PERCENT: 4.6 %
NEUTROPHILS ABSOLUTE: 14.8 K/UL (ref 1.7–7.7)
NEUTROPHILS RELATIVE PERCENT: 88.6 %
PDW BLD-RTO: 13.1 % (ref 12.4–15.4)
PERFORMED ON: ABNORMAL
PERFORMED ON: ABNORMAL
PLATELET # BLD: 199 K/UL (ref 135–450)
PMV BLD AUTO: 8.6 FL (ref 5–10.5)
POTASSIUM REFLEX MAGNESIUM: 4 MMOL/L (ref 3.5–5.1)
RBC # BLD: 4.17 M/UL (ref 4.2–5.9)
SODIUM BLD-SCNC: 134 MMOL/L (ref 136–145)
TOTAL PROTEIN: 6.1 G/DL (ref 6.4–8.2)
WBC # BLD: 16.7 K/UL (ref 4–11)

## 2022-10-29 PROCEDURE — 85025 COMPLETE CBC W/AUTO DIFF WBC: CPT

## 2022-10-29 PROCEDURE — 36415 COLL VENOUS BLD VENIPUNCTURE: CPT

## 2022-10-29 PROCEDURE — 94760 N-INVAS EAR/PLS OXIMETRY 1: CPT

## 2022-10-29 PROCEDURE — 6370000000 HC RX 637 (ALT 250 FOR IP): Performed by: UROLOGY

## 2022-10-29 PROCEDURE — 80053 COMPREHEN METABOLIC PANEL: CPT

## 2022-10-29 PROCEDURE — 2580000003 HC RX 258: Performed by: UROLOGY

## 2022-10-29 RX ADMIN — VALSARTAN 80 MG: 80 TABLET, FILM COATED ORAL at 08:49

## 2022-10-29 RX ADMIN — SODIUM CHLORIDE, PRESERVATIVE FREE 10 ML: 5 INJECTION INTRAVENOUS at 08:49

## 2022-10-29 RX ADMIN — METOPROLOL SUCCINATE 25 MG: 25 TABLET, EXTENDED RELEASE ORAL at 08:49

## 2022-10-29 RX ADMIN — CITALOPRAM HYDROBROMIDE 20 MG: 20 TABLET ORAL at 08:49

## 2022-10-29 RX ADMIN — HYDROCHLOROTHIAZIDE 25 MG: 25 TABLET ORAL at 08:49

## 2022-10-29 RX ADMIN — PREDNISONE 5 MG: 5 TABLET ORAL at 08:49

## 2022-10-29 NOTE — PROGRESS NOTES
Patients  after receiving 4u sliding scale and additional 1x dose of 7u after dinner. Will give 4u  per [facility], will recheck BS at 12A notified Maverick Hanson NP. No new orders at this time.

## 2022-10-29 NOTE — CARE COORDINATION
Discharge order noted. Chart reviewed. Plan is to return home with family. Needs previously denied. No additional discharge needs identified at this time.      Electronically signed by Brenda Le RN MSN on 10/29/2022 at 10:51 AM

## 2022-10-29 NOTE — DISCHARGE SUMMARY
Hospital Discharge Summary    Patient's PCP: Estrada Murphy MD  Admit Date: 10/27/2022   Discharge Date: 10/29/2022    Admitting Physician: Dr. Tanvir Lopez MD  Discharge Physician: Dr. Tanvir Lopez MD     Consults:   IP CONSULT TO UROLOGY  IP CONSULT TO HOSPITALIST    Brief HPI: Patient admitted with hematuria    Brief hospital course: 70-year-old male with history of CAD status post PCI (on aspirin and Plavix), well-controlled diabetes type 2, essential hypertension, hyperlipidemia, ankylosing spondylitis (on chronic steroid), who presents to the emergency room with complaints of gross hematuria, onset since 10/26/2022. He oftentimes get epistaxis. He reached out to his cardiologist, who recommended stopping antiplatelet therapies, referred to the emergency room for evaluation. Hemoglobin is stable. CT-abdomen/pelvis reveals 7.1 cm mass in the bladder, favored to represent blood products. Assessment/plan:  Gross hematuria. Possible contributory factors include aspirin and Plavix use. Holding aspirin and Plavix. Abnormal finding on CT-abdomen/pelvis with 7.1 cm mass concerning for possible blood product. He was started on continuous bladder irrigation. Urology has evaluated and patient is status post cystoscopic on 10/28/2022 - evacuation of blood product. Urology recommends holding plavix therapies for 1 week (per discussion with Dr. Sierra Riggs this morning), follow up as outpatient. Per discussion with patient's cardiologist, patient needs to be on at least just aspirin (agreeable to no plavix) for now. Recinos catheter has been discontinued today. Patient to follow up with urologist and cardiologist as outpatient. Other comorbidities: history of CAD status post PCI, well-controlled diabetes type 2, essential hypertension, hyperlipidemia, ankylosing spondylitis (on chronic steroid). Disposition. Patient to follow up with urologist and cardiologist within 1 week.     Invasive procedures:  Cystoscopy with clot evacuation. Discharge Diagnoses:   See above. Physical Exam: BP (!) 110/55   Pulse 57   Temp 97.5 °F (36.4 °C) (Oral)   Resp 18   Ht 6' 1\" (1.854 m)   Wt 179 lb 0.2 oz (81.2 kg)   SpO2 93%   BMI 23.62 kg/m²   Gen/overall appearance: Not in acute distress. Alert. Oriented X3  Head: Normocephalic, atraumatic  Eyes: EOMI, good acuity  ENT: Oral mucosa moist  Neck: No JVD, thyromegaly  CVS: Nml S1S2, no MRG, RRR  Pulm: Clear bilaterally. No crackles/wheezes  Gastrointestinal: Soft, NT/ND, +BS  Musculoskeletal: No edema. Warm  Neuro: No focal deficit. Moves extremity spontaneously. Psychiatry: Appropriate affect. Not agitated. Skin: Warm, dry with normal turgor. No rash  Capillary refill: Brisk,< 3 seconds   Peripheral Pulses: +2 palpable, equal bilaterally     Significant diagnostic studies that may require follow up:  CT ABDOMEN PELVIS WO CONTRAST Additional Contrast? None    Result Date: 10/27/2022  EXAMINATION: CT OF THE ABDOMEN AND PELVIS WITHOUT CONTRAST 10/27/2022 3:40 pm TECHNIQUE: CT of the abdomen and pelvis was performed without the administration of intravenous contrast. Multiplanar reformatted images are provided for review. Automated exposure control, iterative reconstruction, and/or weight based adjustment of the mA/kV was utilized to reduce the radiation dose to as low as reasonably achievable. COMPARISON: None. HISTORY: ORDERING SYSTEM PROVIDED HISTORY: Hematuria TECHNOLOGIST PROVIDED HISTORY: Reason for exam:->Hematuria Additional Contrast?->None Decision Support Exception - unselect if not a suspected or confirmed emergency medical condition->Emergency Medical Condition (MA) FINDINGS: Lower Chest: The lung bases are clear. There is no pleural effusion. Organs: There is a 6 mm cyst in the right hepatic lobe. The liver, gallbladder, spleen, adrenal glands and pancreas are otherwise unremarkable. There is a 1 cm cyst posteriorly in the left kidney.   The kidneys appear otherwise normal.  There is no ureteral stone or hydronephrosis. GI/Bowel: There is no bowel dilatation or bowel wall thickening. There is a moderate amount of stool in the colon. The rectum is distended. There are colonic diverticula. The stomach is unremarkable. Pelvis: There is a heterogeneous mass in the pelvis dependently measuring as much as 3.1 cm. The prostate gland is enlarged measuring up to 6.2 cm. Peritoneum/Retroperitoneum: The abdominal aorta is normal in caliber. There are no enlarged lymph nodes. No fat stranding, free fluid, free air or focal fluid collection is identified. 2 Bones/Soft Tissues: There are no destructive bone lesions. 1. Heterogeneous 7.1 cm mass at the dependent aspect the bladder favored to represent blood products. 2. No acute findings elsewhere in the abdomen or pelvis. 3. Prominent prostatomegaly. 4. Moderate stool in the colon and distension of the rectum suggesting constipation. 5. Colonic diverticulosis. Treatments: As above.     Discharge Medications:     Medication List        CONTINUE taking these medications      acetaminophen 325 MG tablet  Commonly known as: TYLENOL     albuterol (2.5 MG/3ML) 0.083% nebulizer solution  Commonly known as: PROVENTIL     amLODIPine 10 MG tablet  Commonly known as: NORVASC     candesartan 32 MG tablet  Commonly known as: ATACAND     citalopram 20 MG tablet  Commonly known as: CELEXA     hydroCHLOROthiazide 25 MG tablet  Commonly known as: HYDRODIURIL     melatonin 3 MG Tabs tablet     metFORMIN (OSM) 1000 MG extended release tablet  Commonly known as: FORTAMET     metoprolol succinate 25 MG extended release tablet  Commonly known as: TOPROL XL     predniSONE 5 MG tablet  Commonly known as: DELTASONE     simvastatin 80 MG tablet  Commonly known as: ZOCOR            STOP taking these medications      clopidogrel 75 MG tablet  Commonly known as: PLAVIX              Activity: activity as tolerated  Diet: ADULT DIET; Regular; 4 carb choices (60 gm/meal)      Disposition: home  Discharged Condition: Stable  Follow Up: Charlie May MD  [facility], [address] [place] [address]  [phone]    Schedule an appointment as soon as possible for a visit in 1 week(s)  H&H in 1 week    Mian Pritchett MD  [address] [place] [address]  [phone]    Schedule an appointment as soon as possible for a visit in 1 week(s)        Code status:  Full Code     Total time spent on discharge, finalizing medications, referrals and arranging outpatient follow up was more than 30 minutes      Thank you Dr. Charlie May MD for the opportunity to be involved in this patients care.

## 2022-10-29 NOTE — PROGRESS NOTES
Hospital Medicine Progress Note     Date:  10/29/2022    PCP: Thu Oglesby MD (Tel: [phone])    Date of Admission: 10/27/2022    Chief complaint:   Chief Complaint   Patient presents with    Hematuria     Pt arrives ambulatory for eval of hematuria onset yesterday, pt sts cardiologist said to stop plavix and aspirin, has had epistaxis a few times recently, denies pain       Brief admission history: 70-year-old male with history of CAD status post PCI (on aspirin and Plavix), well-controlled diabetes type 2, essential hypertension, hyperlipidemia, ankylosing spondylitis (on chronic steroid), who presents to the emergency room with complaints of gross hematuria, onset since 10/26/2022. He oftentimes get epistaxis. He reached out to his cardiologist, who recommended stopping antiplatelet therapies, referred to the emergency room for evaluation. Hemoglobin is stable. CT-abdomen/pelvis reveals 7.1 cm mass in the bladder, favored to represent blood products. Assessment/plan:  Gross hematuria. Possible contributory factors include aspirin and Plavix use. Holding aspirin and Plavix. Abnormal finding on CT-abdomen/pelvis with 7.1 cm mass concerning for possible blood product. He was started on continuous bladder irrigation. Urology has evaluated and patient is status post cystoscopic on 10/28/2022 - evacuation of blood product. Urology recommends holding plavix therapies for 1 week, follow up as outpatient. Per discussion with patient's cardiologist, patient needs to be on at least just aspirin (agreeable to no plavix) for now. Recinos catheter has been discontinued today. Patient to follow up with urologist and cardiologist as outpatient. Other comorbidities: history of CAD status post PCI, well-controlled diabetes type 2, essential hypertension, hyperlipidemia, ankylosing spondylitis (on chronic steroid). Diet: ADULT DIET;  Regular; 4 carb choices (60 gm/meal)    Code status: Full Code ----------  Subjective  No new issues. Objective  Physical exam:  Vitals: BP (!) 110/55   Pulse 57   Temp 97.5 °F (36.4 °C) (Oral)   Resp 18   Ht 6' 1\" (1.854 m)   Wt 179 lb 0.2 oz (81.2 kg)   SpO2 93%   BMI 23.62 kg/m²   Gen/overall appearance: Not in acute distress. Alert. Oriented X3  Head: Normocephalic, atraumatic  Eyes: EOMI, good acuity  ENT: Oral mucosa moist  Neck: No JVD, thyromegaly  CVS: Nml S1S2, no MRG, RRR  Pulm: Clear bilaterally. No crackles/wheezes  Gastrointestinal: Soft, NT/ND, +BS  Musculoskeletal: No edema. Warm  Neuro: No focal deficit. Moves extremity spontaneously. Psychiatry: Appropriate affect. Not agitated. Skin: Warm, dry with normal turgor. No rash  Capillary refill: Brisk,< 3 seconds   Peripheral Pulses: +2 palpable, equal bilaterally      24HR INTAKE/OUTPUT:    Intake/Output Summary (Last 24 hours) at 10/29/2022 0916  Last data filed at 10/29/2022 0551  Gross per 24 hour   Intake 960 ml   Output 2050 ml   Net -1090 ml       I/O last 3 completed shifts: In: 1630 [P.O.:1080; I.V.:500; IV Piggyback:50]  Out: 4300 [Urine:4300]  No intake/output data recorded.   Meds:    amLODIPine  10 mg Oral Nightly    valsartan  80 mg Oral Daily    citalopram  20 mg Oral Daily    hydroCHLOROthiazide  25 mg Oral Daily    predniSONE  5 mg Oral Daily    atorvastatin  20 mg Oral Nightly    insulin lispro  0-4 Units SubCUTAneous TID WC    insulin lispro  0-4 Units SubCUTAneous Nightly    sodium chloride flush  5-40 mL IntraVENous 2 times per day    metoprolol succinate  25 mg Oral Daily     Infusions:    sodium chloride Stopped (10/27/22 2136)    dextrose      sodium chloride       PRN Meds: opium-belladonna, albuterol, melatonin, glucose, dextrose bolus **OR** dextrose bolus, glucagon (rDNA), dextrose, sodium chloride flush, sodium chloride, ondansetron **OR** ondansetron, polyethylene glycol, acetaminophen **OR** acetaminophen    Labs/imaging:  CBC:    Recent Labs     10/27/22  3034 10/28/22  0620 10/29/22  0603   WBC 9.5 10.5 16.7*   HGB 14.0 13.8 12.9*    197 199        BMP:    Recent Labs     10/27/22  1607 10/28/22  0620 10/29/22  0603    139 134*   K 4.1 4.1 4.0   CL 97* 100 98*   CO2 31 28 25   BUN 27* 23* 24*   CREATININE 1.1 1.1 1.2   GLUCOSE 147* 136* 222*       Hepatic:   Recent Labs     10/27/22  1607 10/28/22  0620 10/29/22  0603   AST 14* 10* 9*   ALT 8* 7* <5*   BILITOT 0.4 0.5 0.4   ALKPHOS 95 84 70         Cody Urbina MD  -------------------------------  Rounding hospitalist

## 2022-10-29 NOTE — PROGRESS NOTES
Pt Name: Denia Bowen  Medical Record Number: [id number]  Date of Birth 12/24/1930   Today's Date: 10/29/2022      Subjective:  denies pain, nausea, feve    ROS: Constitutional: No fever    Vitals:  Vitals:    10/28/22 1945 10/28/22 2143 10/28/22 2245 10/29/22 0545   BP: (!) 114/55 (!) 98/42 (!) 118/58 (!) 131/54   Pulse: 58   55   Resp: 18   16   Temp: 98.1 °F (36.7 °C)   97.8 °F (36.6 °C)   TempSrc: Oral   Oral   SpO2: 93%   92%   Weight:    179 lb 0.2 oz (81.2 kg)   Height:         I/O last 3 completed shifts:   In: 1630 [P.O.:1080; I.V.:500; IV Piggyback:50]  Out: 4300 [Urine:4300]    Exam:  General: Awake, oriented, no acute distress  Respiratory: Nonlabored breathing  Abdomen: Soft, non-tender, non-distended, no masses  : urine clear  Skin: Skin color, texture, turgor normal, no rashes or lesions  Neurologic: no gross deficits    CURRENT MEDICATIONS   Scheduled Meds:   amLODIPine  10 mg Oral Nightly    valsartan  80 mg Oral Daily    citalopram  20 mg Oral Daily    hydroCHLOROthiazide  25 mg Oral Daily    predniSONE  5 mg Oral Daily    atorvastatin  20 mg Oral Nightly    insulin lispro  0-4 Units SubCUTAneous TID WC    insulin lispro  0-4 Units SubCUTAneous Nightly    sodium chloride flush  5-40 mL IntraVENous 2 times per day    metoprolol succinate  25 mg Oral Daily     Continuous Infusions:   sodium chloride Stopped (10/27/22 2136)    dextrose      sodium chloride       PRN Meds:.opium-belladonna, albuterol, melatonin, glucose, dextrose bolus **OR** dextrose bolus, glucagon (rDNA), dextrose, sodium chloride flush, sodium chloride, ondansetron **OR** ondansetron, polyethylene glycol, acetaminophen **OR** acetaminophen    LABS     Recent Labs     10/27/22  1607 10/28/22  0620 10/29/22  0603   WBC 9.5 10.5 16.7*   HGB 14.0 13.8 12.9*   HCT 41.9 39.4* 37.1*    197 199    139 134*   K 4.1 4.1 4.0   CL 97* 100 98*   CO2 31 28 25   BUN 27* 23* 24*   CREATININE 1.1 1.1 1.2   CALCIUM 8.9 9.2 9.0   INR 1.04  --   --    AST 14* 10* 9*   ALT 8* 7* <5*   BILITOT 0.4 0.5 0.4           ASSESSMENT   Hospital day # 2  S/p clot evac 10/28/22 for clot retention  PLAN   Dc valle today.   Ok to [employer] home once he is able to void    Sheri Salazar MD 10/29/2022 9:00 AM

## 2025-04-09 ENCOUNTER — APPOINTMENT (OUTPATIENT)
Dept: GENERAL RADIOLOGY | Age: 89
DRG: 178 | End: 2025-04-09
Payer: MEDICARE

## 2025-04-09 ENCOUNTER — HOSPITAL ENCOUNTER (INPATIENT)
Age: 89
LOS: 1 days | Discharge: HOME HEALTH | DRG: 178 | End: 2025-04-11
Attending: EMERGENCY MEDICINE | Admitting: HOSPITALIST
Payer: MEDICARE

## 2025-04-09 DIAGNOSIS — J20.9 ACUTE BRONCHITIS, UNSPECIFIED ORGANISM: ICD-10-CM

## 2025-04-09 DIAGNOSIS — R05.1 ACUTE COUGH: ICD-10-CM

## 2025-04-09 DIAGNOSIS — A41.9 SEPSIS, DUE TO UNSPECIFIED ORGANISM, UNSPECIFIED WHETHER ACUTE ORGAN DYSFUNCTION PRESENT (HCC): Primary | ICD-10-CM

## 2025-04-09 PROBLEM — R79.89 ELEVATED TROPONIN: Status: ACTIVE | Noted: 2025-04-09

## 2025-04-09 PROBLEM — U07.1 COVID-19: Status: ACTIVE | Noted: 2025-04-09
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
## 2025-04-09 PROBLEM — R09.02 HYPOXIA: Status: ACTIVE | Noted: 2025-04-09

## 2025-04-09 PROBLEM — R65.10 SIRS (SYSTEMIC INFLAMMATORY RESPONSE SYNDROME) (HCC): Status: ACTIVE | Noted: 2025-04-09

## 2025-04-09 PROBLEM — R19.7 DIARRHEA: Status: ACTIVE | Noted: 2025-04-09

## 2025-04-09 PROBLEM — I10 PRIMARY HYPERTENSION: Status: ACTIVE | Noted: 2025-04-09

## 2025-04-09 PROBLEM — N18.31 CKD STAGE 3A, GFR 45-59 ML/MIN (HCC): Status: ACTIVE | Noted: 2025-04-09

## 2025-04-09 PROBLEM — U07.1 COVID-19 VIRUS INFECTION: Status: ACTIVE | Noted: 2025-04-09

## 2025-04-09 PROBLEM — E87.8 HYPOCHLOREMIA: Status: ACTIVE | Noted: 2025-04-09

## 2025-04-09 PROBLEM — E87.1 HYPONATREMIA: Status: ACTIVE | Noted: 2025-04-09

## 2025-04-09 LAB
25(OH)D3 SERPL-MCNC: 30.8 NG/ML
ALBUMIN SERPL-MCNC: 4.5 G/DL (ref 3.4–5)
ALBUMIN/GLOB SERPL: 1.7 {RATIO} (ref 1.1–2.2)
ALP SERPL-CCNC: 84 U/L (ref 40–129)
ALT SERPL-CCNC: 9 U/L (ref 10–40)
ANION GAP SERPL CALCULATED.3IONS-SCNC: 14 MMOL/L (ref 3–16)
AST SERPL-CCNC: 24 U/L (ref 15–37)
BACTERIA URNS QL MICRO: NORMAL /HPF
BASOPHILS # BLD: 0 K/UL (ref 0–0.2)
BASOPHILS NFR BLD: 0.2 %
BILIRUB SERPL-MCNC: 0.9 MG/DL (ref 0–1)
BILIRUB UR QL STRIP.AUTO: NEGATIVE
BUN SERPL-MCNC: 35 MG/DL (ref 7–20)
CALCIUM SERPL-MCNC: 9.3 MG/DL (ref 8.3–10.6)
CHLORIDE SERPL-SCNC: 97 MMOL/L (ref 99–110)
CLARITY UR: CLEAR
CO2 SERPL-SCNC: 24 MMOL/L (ref 21–32)
COLOR UR: YELLOW
CREAT SERPL-MCNC: 1.3 MG/DL (ref 0.8–1.3)
DEPRECATED RDW RBC AUTO: 13.4 % (ref 12.4–15.4)
EOSINOPHIL # BLD: 0.1 K/UL (ref 0–0.6)
EOSINOPHIL NFR BLD: 1.1 %
EPI CELLS #/AREA URNS AUTO: 0 /HPF (ref 0–5)
FLUAV + FLUBV AG NOSE IA.RAPID: NOT DETECTED
FLUAV + FLUBV AG NOSE IA.RAPID: NOT DETECTED
GFR SERPLBLD CREATININE-BSD FMLA CKD-EPI: 51 ML/MIN/{1.73_M2}
GLUCOSE BLD-MCNC: 182 MG/DL (ref 70–99)
GLUCOSE SERPL-MCNC: 178 MG/DL (ref 70–99)
GLUCOSE UR STRIP.AUTO-MCNC: NEGATIVE MG/DL
HCT VFR BLD AUTO: 41.9 % (ref 40.5–52.5)
HGB BLD-MCNC: 14 G/DL (ref 13.5–17.5)
HGB UR QL STRIP.AUTO: ABNORMAL
HYALINE CASTS #/AREA URNS AUTO: 0 /LPF (ref 0–8)
KETONES UR STRIP.AUTO-MCNC: 15 MG/DL
LACTATE BLDV-SCNC: 1.5 MMOL/L (ref 0.4–1.9)
LACTATE BLDV-SCNC: 1.5 MMOL/L (ref 0.4–1.9)
LEUKOCYTE ESTERASE UR QL STRIP.AUTO: NEGATIVE
LYMPHOCYTES # BLD: 0.6 K/UL (ref 1–5.1)
LYMPHOCYTES NFR BLD: 5.7 %
MAGNESIUM SERPL-MCNC: 1.55 MG/DL (ref 1.8–2.4)
MCH RBC QN AUTO: 31.5 PG (ref 26–34)
MCHC RBC AUTO-ENTMCNC: 33.5 G/DL (ref 31–36)
MCV RBC AUTO: 93.9 FL (ref 80–100)
MONOCYTES # BLD: 0.8 K/UL (ref 0–1.3)
MONOCYTES NFR BLD: 7.9 %
NEUTROPHILS # BLD: 9.1 K/UL (ref 1.7–7.7)
NEUTROPHILS NFR BLD: 85.1 %
NITRITE UR QL STRIP.AUTO: NEGATIVE
PERFORMED ON: ABNORMAL
PH UR STRIP.AUTO: 5.5 [PH] (ref 5–8)
PLATELET # BLD AUTO: 174 K/UL (ref 135–450)
PMV BLD AUTO: 8.9 FL (ref 5–10.5)
POTASSIUM SERPL-SCNC: 4.1 MMOL/L (ref 3.5–5.1)
PROCALCITONIN SERPL IA-MCNC: 0.1 NG/ML (ref 0–0.15)
PROT SERPL-MCNC: 7.2 G/DL (ref 6.4–8.2)
PROT UR STRIP.AUTO-MCNC: 30 MG/DL
RBC # BLD AUTO: 4.46 M/UL (ref 4.2–5.9)
RBC CLUMPS #/AREA URNS AUTO: 1 /HPF (ref 0–4)
SARS-COV-2 RDRP RESP QL NAA+PROBE: DETECTED
SODIUM SERPL-SCNC: 135 MMOL/L (ref 136–145)
SP GR UR STRIP.AUTO: 1.02 (ref 1–1.03)
TROPONIN, HIGH SENSITIVITY: 27 NG/L (ref 0–22)
TROPONIN, HIGH SENSITIVITY: 28 NG/L (ref 0–22)
UA COMPLETE W REFLEX CULTURE PNL UR: ABNORMAL
UA DIPSTICK W REFLEX MICRO PNL UR: YES
URN SPEC COLLECT METH UR: ABNORMAL
UROBILINOGEN UR STRIP-ACNC: 0.2 E.U./DL
WBC # BLD AUTO: 10.7 K/UL (ref 4–11)
WBC #/AREA URNS AUTO: 0 /HPF (ref 0–5)

## 2025-04-09 PROCEDURE — 93005 ELECTROCARDIOGRAM TRACING: CPT | Performed by: EMERGENCY MEDICINE

## 2025-04-09 PROCEDURE — 96374 THER/PROPH/DIAG INJ IV PUSH: CPT

## 2025-04-09 PROCEDURE — 6370000000 HC RX 637 (ALT 250 FOR IP): Performed by: EMERGENCY MEDICINE

## 2025-04-09 PROCEDURE — 96375 TX/PRO/DX INJ NEW DRUG ADDON: CPT

## 2025-04-09 PROCEDURE — 6360000002 HC RX W HCPCS: Performed by: EMERGENCY MEDICINE

## 2025-04-09 PROCEDURE — 94640 AIRWAY INHALATION TREATMENT: CPT

## 2025-04-09 PROCEDURE — 83605 ASSAY OF LACTIC ACID: CPT

## 2025-04-09 PROCEDURE — 72100 X-RAY EXAM L-S SPINE 2/3 VWS: CPT

## 2025-04-09 PROCEDURE — 71045 X-RAY EXAM CHEST 1 VIEW: CPT

## 2025-04-09 PROCEDURE — 81001 URINALYSIS AUTO W/SCOPE: CPT

## 2025-04-09 PROCEDURE — 80053 COMPREHEN METABOLIC PANEL: CPT

## 2025-04-09 PROCEDURE — 2500000003 HC RX 250 WO HCPCS: Performed by: EMERGENCY MEDICINE

## 2025-04-09 PROCEDURE — 2700000000 HC OXYGEN THERAPY PER DAY

## 2025-04-09 PROCEDURE — 84484 ASSAY OF TROPONIN QUANT: CPT

## 2025-04-09 PROCEDURE — 99285 EMERGENCY DEPT VISIT HI MDM: CPT

## 2025-04-09 PROCEDURE — 2580000003 HC RX 258: Performed by: EMERGENCY MEDICINE

## 2025-04-09 PROCEDURE — 72170 X-RAY EXAM OF PELVIS: CPT

## 2025-04-09 PROCEDURE — G0378 HOSPITAL OBSERVATION PER HR: HCPCS

## 2025-04-09 PROCEDURE — 83735 ASSAY OF MAGNESIUM: CPT

## 2025-04-09 PROCEDURE — 82306 VITAMIN D 25 HYDROXY: CPT

## 2025-04-09 PROCEDURE — 87635 SARS-COV-2 COVID-19 AMP PRB: CPT

## 2025-04-09 PROCEDURE — 87040 BLOOD CULTURE FOR BACTERIA: CPT

## 2025-04-09 PROCEDURE — 84145 PROCALCITONIN (PCT): CPT

## 2025-04-09 PROCEDURE — 87502 INFLUENZA DNA AMP PROBE: CPT

## 2025-04-09 PROCEDURE — 96361 HYDRATE IV INFUSION ADD-ON: CPT

## 2025-04-09 PROCEDURE — 85025 COMPLETE CBC W/AUTO DIFF WBC: CPT

## 2025-04-09 RX ORDER — INSULIN LISPRO 100 [IU]/ML
0-4 INJECTION, SOLUTION INTRAVENOUS; SUBCUTANEOUS EVERY 6 HOURS SCHEDULED
Status: DISCONTINUED | OUTPATIENT
Start: 2025-04-10 | End: 2025-04-11

## 2025-04-09 RX ORDER — POLYETHYLENE GLYCOL 3350 17 G/17G
17 POWDER, FOR SOLUTION ORAL DAILY PRN
Status: DISCONTINUED | OUTPATIENT
Start: 2025-04-09 | End: 2025-04-11 | Stop reason: HOSPADM

## 2025-04-09 RX ORDER — ACETAMINOPHEN 325 MG/1
650 TABLET ORAL EVERY 6 HOURS PRN
Status: DISCONTINUED | OUTPATIENT
Start: 2025-04-09 | End: 2025-04-11 | Stop reason: HOSPADM

## 2025-04-09 RX ORDER — BENZONATATE 100 MG/1
100 CAPSULE ORAL 3 TIMES DAILY PRN
Status: DISCONTINUED | OUTPATIENT
Start: 2025-04-09 | End: 2025-04-10

## 2025-04-09 RX ORDER — DEXTROSE MONOHYDRATE 100 MG/ML
INJECTION, SOLUTION INTRAVENOUS CONTINUOUS PRN
Status: DISCONTINUED | OUTPATIENT
Start: 2025-04-09 | End: 2025-04-11 | Stop reason: HOSPADM

## 2025-04-09 RX ORDER — GLUCAGON 1 MG/ML
1 KIT INJECTION PRN
Status: DISCONTINUED | OUTPATIENT
Start: 2025-04-09 | End: 2025-04-11 | Stop reason: HOSPADM

## 2025-04-09 RX ORDER — ASPIRIN 81 MG/1
81 TABLET, CHEWABLE ORAL DAILY
Status: DISCONTINUED | OUTPATIENT
Start: 2025-04-10 | End: 2025-04-11 | Stop reason: HOSPADM

## 2025-04-09 RX ORDER — SODIUM CHLORIDE 9 MG/ML
INJECTION, SOLUTION INTRAVENOUS PRN
Status: DISCONTINUED | OUTPATIENT
Start: 2025-04-09 | End: 2025-04-11 | Stop reason: HOSPADM

## 2025-04-09 RX ORDER — AZITHROMYCIN 500 MG/1
500 TABLET, FILM COATED ORAL ONCE
Status: COMPLETED | OUTPATIENT
Start: 2025-04-09 | End: 2025-04-09

## 2025-04-09 RX ORDER — TAMSULOSIN HYDROCHLORIDE 0.4 MG/1
0.4 CAPSULE ORAL NIGHTLY
Status: DISCONTINUED | OUTPATIENT
Start: 2025-04-10 | End: 2025-04-11 | Stop reason: HOSPADM

## 2025-04-09 RX ORDER — ROSUVASTATIN CALCIUM 20 MG/1
20 TABLET, COATED ORAL DAILY
Status: DISCONTINUED | OUTPATIENT
Start: 2025-04-10 | End: 2025-04-11 | Stop reason: HOSPADM

## 2025-04-09 RX ORDER — ONDANSETRON 2 MG/ML
8 INJECTION INTRAMUSCULAR; INTRAVENOUS ONCE
Status: COMPLETED | OUTPATIENT
Start: 2025-04-09 | End: 2025-04-09

## 2025-04-09 RX ORDER — SODIUM CHLORIDE 0.9 % (FLUSH) 0.9 %
5-40 SYRINGE (ML) INJECTION EVERY 12 HOURS SCHEDULED
Status: DISCONTINUED | OUTPATIENT
Start: 2025-04-10 | End: 2025-04-11 | Stop reason: HOSPADM

## 2025-04-09 RX ORDER — DEXAMETHASONE 4 MG/1
6 TABLET ORAL DAILY
Status: DISCONTINUED | OUTPATIENT
Start: 2025-04-09 | End: 2025-04-11 | Stop reason: HOSPADM

## 2025-04-09 RX ORDER — 0.9 % SODIUM CHLORIDE 0.9 %
1000 INTRAVENOUS SOLUTION INTRAVENOUS ONCE
Status: COMPLETED | OUTPATIENT
Start: 2025-04-09 | End: 2025-04-09

## 2025-04-09 RX ORDER — POTASSIUM CHLORIDE 1500 MG/1
40 TABLET, EXTENDED RELEASE ORAL PRN
Status: DISCONTINUED | OUTPATIENT
Start: 2025-04-09 | End: 2025-04-11 | Stop reason: HOSPADM

## 2025-04-09 RX ORDER — FINASTERIDE 5 MG/1
5 TABLET, FILM COATED ORAL DAILY
Status: DISCONTINUED | OUTPATIENT
Start: 2025-04-10 | End: 2025-04-11 | Stop reason: HOSPADM

## 2025-04-09 RX ORDER — CITALOPRAM HYDROBROMIDE 20 MG/1
20 TABLET ORAL DAILY
Status: DISCONTINUED | OUTPATIENT
Start: 2025-04-10 | End: 2025-04-11 | Stop reason: HOSPADM

## 2025-04-09 RX ORDER — ENOXAPARIN SODIUM 100 MG/ML
40 INJECTION SUBCUTANEOUS DAILY
Status: DISCONTINUED | OUTPATIENT
Start: 2025-04-10 | End: 2025-04-11 | Stop reason: HOSPADM

## 2025-04-09 RX ORDER — FINASTERIDE 5 MG/1
5 TABLET, FILM COATED ORAL DAILY
COMMUNITY

## 2025-04-09 RX ORDER — POTASSIUM CHLORIDE 7.45 MG/ML
10 INJECTION INTRAVENOUS PRN
Status: DISCONTINUED | OUTPATIENT
Start: 2025-04-09 | End: 2025-04-11 | Stop reason: HOSPADM

## 2025-04-09 RX ORDER — ONDANSETRON 2 MG/ML
4 INJECTION INTRAMUSCULAR; INTRAVENOUS EVERY 6 HOURS PRN
Status: DISCONTINUED | OUTPATIENT
Start: 2025-04-09 | End: 2025-04-11 | Stop reason: HOSPADM

## 2025-04-09 RX ORDER — MAGNESIUM SULFATE IN WATER 40 MG/ML
2000 INJECTION, SOLUTION INTRAVENOUS PRN
Status: DISCONTINUED | OUTPATIENT
Start: 2025-04-09 | End: 2025-04-11 | Stop reason: HOSPADM

## 2025-04-09 RX ORDER — SODIUM CHLORIDE 9 MG/ML
INJECTION, SOLUTION INTRAVENOUS CONTINUOUS
Status: DISCONTINUED | OUTPATIENT
Start: 2025-04-09 | End: 2025-04-11

## 2025-04-09 RX ORDER — ASPIRIN 81 MG/1
81 TABLET, CHEWABLE ORAL DAILY
COMMUNITY

## 2025-04-09 RX ORDER — GUAIFENESIN 600 MG/1
600 TABLET, EXTENDED RELEASE ORAL 2 TIMES DAILY
Status: DISCONTINUED | OUTPATIENT
Start: 2025-04-09 | End: 2025-04-11 | Stop reason: HOSPADM

## 2025-04-09 RX ORDER — ACETAMINOPHEN 650 MG/1
650 SUPPOSITORY RECTAL EVERY 6 HOURS PRN
Status: DISCONTINUED | OUTPATIENT
Start: 2025-04-09 | End: 2025-04-11 | Stop reason: HOSPADM

## 2025-04-09 RX ORDER — SODIUM CHLORIDE 0.9 % (FLUSH) 0.9 %
5-40 SYRINGE (ML) INJECTION PRN
Status: DISCONTINUED | OUTPATIENT
Start: 2025-04-09 | End: 2025-04-11 | Stop reason: HOSPADM

## 2025-04-09 RX ORDER — ROSUVASTATIN CALCIUM 20 MG/1
20 TABLET, COATED ORAL DAILY
COMMUNITY
Start: 2025-01-09

## 2025-04-09 RX ORDER — TAMSULOSIN HYDROCHLORIDE 0.4 MG/1
0.4 CAPSULE ORAL NIGHTLY
COMMUNITY

## 2025-04-09 RX ORDER — IPRATROPIUM BROMIDE AND ALBUTEROL SULFATE 2.5; .5 MG/3ML; MG/3ML
1 SOLUTION RESPIRATORY (INHALATION) ONCE
Status: COMPLETED | OUTPATIENT
Start: 2025-04-09 | End: 2025-04-09

## 2025-04-09 RX ORDER — ONDANSETRON 4 MG/1
4 TABLET, ORALLY DISINTEGRATING ORAL EVERY 8 HOURS PRN
Status: DISCONTINUED | OUTPATIENT
Start: 2025-04-09 | End: 2025-04-11 | Stop reason: HOSPADM

## 2025-04-09 RX ADMIN — WATER 1000 MG: 1 INJECTION INTRAMUSCULAR; INTRAVENOUS; SUBCUTANEOUS at 19:27

## 2025-04-09 RX ADMIN — IPRATROPIUM BROMIDE AND ALBUTEROL SULFATE 1 DOSE: .5; 2.5 SOLUTION RESPIRATORY (INHALATION) at 21:01

## 2025-04-09 RX ADMIN — ONDANSETRON 8 MG: 2 INJECTION, SOLUTION INTRAMUSCULAR; INTRAVENOUS at 19:23

## 2025-04-09 RX ADMIN — AZITHROMYCIN 500 MG: 500 TABLET, FILM COATED ORAL at 19:24

## 2025-04-09 RX ADMIN — SODIUM CHLORIDE 1000 ML: 0.9 INJECTION, SOLUTION INTRAVENOUS at 19:37

## 2025-04-09 ASSESSMENT — PAIN DESCRIPTION - LOCATION: LOCATION: BACK

## 2025-04-09 ASSESSMENT — LIFESTYLE VARIABLES
HOW OFTEN DO YOU HAVE A DRINK CONTAINING ALCOHOL: NEVER
HOW MANY STANDARD DRINKS CONTAINING ALCOHOL DO YOU HAVE ON A TYPICAL DAY: PATIENT DOES NOT DRINK

## 2025-04-09 ASSESSMENT — PAIN DESCRIPTION - PAIN TYPE: TYPE: ACUTE PAIN

## 2025-04-09 ASSESSMENT — PAIN SCALES - GENERAL: PAINLEVEL_OUTOF10: 5

## 2025-04-09 ASSESSMENT — PAIN - FUNCTIONAL ASSESSMENT
PAIN_FUNCTIONAL_ASSESSMENT: 0-10
PAIN_FUNCTIONAL_ASSESSMENT: PREVENTS OR INTERFERES SOME ACTIVE ACTIVITIES AND ADLS

## 2025-04-09 ASSESSMENT — PAIN DESCRIPTION - DESCRIPTORS: DESCRIPTORS: ACHING

## 2025-04-09 NOTE — ED PROVIDER NOTES
[facility] EMERGENCY DEPARTMENT  EMERGENCY DEPARTMENT ENCOUNTER      Pt Name: Vinay Mandel  MRN: [id number]  Birthdate 12/24/1930  Date of evaluation: 4/9/2025  Provider: KEILA BURGOS DO    CHIEF COMPLAINT  Chief Complaint   Patient presents with    Cough     Cough and nausea starting today. No vomiting. No Po intake. Diabetic hx. Back pain starting Sunday.          This patient is at risk for communicable infection.  Therefore, personal protection equipment consisting of a mask and gloves was worn for the exam.    HPI  Vinay Mandel is a 89 y.o. male who presents with shortness of breath that started this morning.  He states has had a fever.  He has had confusion but denies any focal neurologic signs.  He states he has not had any wheezing.  He has not been cough anything up.  He is diabetic but does not know what his blood sugar has been running recently.  He states he has had diarrhea for the past 24 hours he has also had nausea.  He denies any abdominal pain or chest pain.  He denies any history of congestive heart failure.  Patient lives at home and lives alone.    REVIEW OF SYSTEMS  All systems negative except as noted in the HPI.  Reviewed Nurses' notes and concur.    No LMP for male patient.    PAST MEDICAL HISTORY  Past Medical History:   Diagnosis Date    Ankylosing spondylitis (HCC)     Basal cell carcinoma     Diabetes mellitus type II, controlled (HCC)     Hyperlipidemia     Hypertension     Varicella        FAMILY HISTORY  Family History   Problem Relation Age of Onset    Stroke Father     Cancer Sister        SOCIAL HISTORY   reports that he has never smoked. He has never used smokeless tobacco. He reports that he does not currently use alcohol. He reports that he does not use drugs.    SURGICAL HISTORY  Past Surgical History:   Procedure Laterality Date    CERVICAL FUSION      CYSTOSCOPY N/A 10/28/2022    CYSTOSCOPY EVACUATION OF CLOTS performed by Monica Arango MD at [facility] OR

## 2025-04-09 NOTE — ED TRIAGE NOTES
Cough and nausea starting today. No vomiting. No Po intake. Diabetic hx. Back pain starting Sunday.

## 2025-04-10 PROBLEM — J40 BRONCHITIS: Status: ACTIVE | Noted: 2025-04-10

## 2025-04-10 LAB
ANION GAP SERPL CALCULATED.3IONS-SCNC: 12 MMOL/L (ref 3–16)
BASOPHILS # BLD: 0 K/UL (ref 0–0.2)
BASOPHILS NFR BLD: 0.3 %
BUN SERPL-MCNC: 33 MG/DL (ref 7–20)
CALCIUM SERPL-MCNC: 8.6 MG/DL (ref 8.3–10.6)
CHLORIDE SERPL-SCNC: 99 MMOL/L (ref 99–110)
CO2 SERPL-SCNC: 24 MMOL/L (ref 21–32)
CREAT SERPL-MCNC: 1.4 MG/DL (ref 0.8–1.3)
DEPRECATED RDW RBC AUTO: 13.2 % (ref 12.4–15.4)
EKG DIAGNOSIS: NORMAL
EKG Q-T INTERVAL: 450 MS
EKG QRS DURATION: 84 MS
EKG QTC CALCULATION (BAZETT): 441 MS
EKG R AXIS: -16 DEGREES
EKG T AXIS: 87 DEGREES
EKG VENTRICULAR RATE: 58 BPM
EOSINOPHIL # BLD: 0 K/UL (ref 0–0.6)
EOSINOPHIL NFR BLD: 0.1 %
EST. AVERAGE GLUCOSE BLD GHB EST-MCNC: 168.6 MG/DL
GFR SERPLBLD CREATININE-BSD FMLA CKD-EPI: 46 ML/MIN/{1.73_M2}
GLUCOSE BLD-MCNC: 156 MG/DL (ref 70–99)
GLUCOSE BLD-MCNC: 217 MG/DL (ref 70–99)
GLUCOSE BLD-MCNC: 292 MG/DL (ref 70–99)
GLUCOSE BLD-MCNC: 298 MG/DL (ref 70–99)
GLUCOSE BLD-MCNC: 380 MG/DL (ref 70–99)
GLUCOSE SERPL-MCNC: 195 MG/DL (ref 70–99)
HBA1C MFR BLD: 7.5 %
HCT VFR BLD AUTO: 35.5 % (ref 40.5–52.5)
HGB BLD-MCNC: 12.4 G/DL (ref 13.5–17.5)
LYMPHOCYTES # BLD: 0.4 K/UL (ref 1–5.1)
LYMPHOCYTES NFR BLD: 4.4 %
MAGNESIUM SERPL-MCNC: 2.23 MG/DL (ref 1.8–2.4)
MCH RBC QN AUTO: 32.1 PG (ref 26–34)
MCHC RBC AUTO-ENTMCNC: 35.1 G/DL (ref 31–36)
MCV RBC AUTO: 91.4 FL (ref 80–100)
MONOCYTES # BLD: 0.3 K/UL (ref 0–1.3)
MONOCYTES NFR BLD: 2.8 %
NEUTROPHILS # BLD: 8.6 K/UL (ref 1.7–7.7)
NEUTROPHILS NFR BLD: 92.4 %
PERFORMED ON: ABNORMAL
PLATELET # BLD AUTO: 150 K/UL (ref 135–450)
PMV BLD AUTO: 9.1 FL (ref 5–10.5)
POTASSIUM SERPL-SCNC: 4.1 MMOL/L (ref 3.5–5.1)
RBC # BLD AUTO: 3.88 M/UL (ref 4.2–5.9)
SODIUM SERPL-SCNC: 135 MMOL/L (ref 136–145)
WBC # BLD AUTO: 9.3 K/UL (ref 4–11)

## 2025-04-10 PROCEDURE — 94761 N-INVAS EAR/PLS OXIMETRY MLT: CPT

## 2025-04-10 PROCEDURE — 36415 COLL VENOUS BLD VENIPUNCTURE: CPT

## 2025-04-10 PROCEDURE — 6370000000 HC RX 637 (ALT 250 FOR IP): Performed by: STUDENT IN AN ORGANIZED HEALTH CARE EDUCATION/TRAINING PROGRAM

## 2025-04-10 PROCEDURE — 93010 ELECTROCARDIOGRAM REPORT: CPT | Performed by: INTERNAL MEDICINE

## 2025-04-10 PROCEDURE — 6370000000 HC RX 637 (ALT 250 FOR IP): Performed by: NURSE PRACTITIONER

## 2025-04-10 PROCEDURE — 1200000000 HC SEMI PRIVATE

## 2025-04-10 PROCEDURE — 2580000003 HC RX 258: Performed by: HOSPITALIST

## 2025-04-10 PROCEDURE — 2700000000 HC OXYGEN THERAPY PER DAY

## 2025-04-10 PROCEDURE — 96372 THER/PROPH/DIAG INJ SC/IM: CPT

## 2025-04-10 PROCEDURE — 97535 SELF CARE MNGMENT TRAINING: CPT

## 2025-04-10 PROCEDURE — 2500000003 HC RX 250 WO HCPCS: Performed by: HOSPITALIST

## 2025-04-10 PROCEDURE — 96365 THER/PROPH/DIAG IV INF INIT: CPT

## 2025-04-10 PROCEDURE — 97161 PT EVAL LOW COMPLEX 20 MIN: CPT

## 2025-04-10 PROCEDURE — 6370000000 HC RX 637 (ALT 250 FOR IP): Performed by: HOSPITALIST

## 2025-04-10 PROCEDURE — 97116 GAIT TRAINING THERAPY: CPT

## 2025-04-10 PROCEDURE — 96361 HYDRATE IV INFUSION ADD-ON: CPT

## 2025-04-10 PROCEDURE — 83036 HEMOGLOBIN GLYCOSYLATED A1C: CPT

## 2025-04-10 PROCEDURE — 97530 THERAPEUTIC ACTIVITIES: CPT

## 2025-04-10 PROCEDURE — 6360000002 HC RX W HCPCS: Performed by: HOSPITALIST

## 2025-04-10 PROCEDURE — 80048 BASIC METABOLIC PNL TOTAL CA: CPT

## 2025-04-10 PROCEDURE — 96366 THER/PROPH/DIAG IV INF ADDON: CPT

## 2025-04-10 PROCEDURE — 83735 ASSAY OF MAGNESIUM: CPT

## 2025-04-10 PROCEDURE — 97165 OT EVAL LOW COMPLEX 30 MIN: CPT

## 2025-04-10 PROCEDURE — 85025 COMPLETE CBC W/AUTO DIFF WBC: CPT

## 2025-04-10 RX ORDER — BENZONATATE 100 MG/1
100 CAPSULE ORAL 3 TIMES DAILY
Status: DISCONTINUED | OUTPATIENT
Start: 2025-04-10 | End: 2025-04-11 | Stop reason: HOSPADM

## 2025-04-10 RX ORDER — TRAZODONE HYDROCHLORIDE 50 MG/1
50 TABLET ORAL ONCE
Status: COMPLETED | OUTPATIENT
Start: 2025-04-10 | End: 2025-04-10

## 2025-04-10 RX ORDER — IPRATROPIUM BROMIDE AND ALBUTEROL SULFATE 2.5; .5 MG/3ML; MG/3ML
1 SOLUTION RESPIRATORY (INHALATION) EVERY 4 HOURS PRN
Status: DISCONTINUED | OUTPATIENT
Start: 2025-04-10 | End: 2025-04-11 | Stop reason: HOSPADM

## 2025-04-10 RX ADMIN — TAMSULOSIN HYDROCHLORIDE 0.4 MG: 0.4 CAPSULE ORAL at 22:17

## 2025-04-10 RX ADMIN — SODIUM CHLORIDE, PRESERVATIVE FREE 10 ML: 5 INJECTION INTRAVENOUS at 08:11

## 2025-04-10 RX ADMIN — GUAIFENESIN 600 MG: 600 TABLET ORAL at 08:10

## 2025-04-10 RX ADMIN — ROSUVASTATIN CALCIUM 20 MG: 20 TABLET, FILM COATED ORAL at 08:10

## 2025-04-10 RX ADMIN — BENZONATATE 100 MG: 100 CAPSULE ORAL at 01:59

## 2025-04-10 RX ADMIN — ASPIRIN 81 MG: 81 TABLET, CHEWABLE ORAL at 08:10

## 2025-04-10 RX ADMIN — BENZONATATE 100 MG: 100 CAPSULE ORAL at 15:42

## 2025-04-10 RX ADMIN — GUAIFENESIN 600 MG: 600 TABLET ORAL at 00:54

## 2025-04-10 RX ADMIN — TRAZODONE HYDROCHLORIDE 50 MG: 50 TABLET ORAL at 22:17

## 2025-04-10 RX ADMIN — BENZONATATE 100 MG: 100 CAPSULE ORAL at 10:36

## 2025-04-10 RX ADMIN — MAGNESIUM SULFATE HEPTAHYDRATE 2000 MG: 40 INJECTION, SOLUTION INTRAVENOUS at 03:16

## 2025-04-10 RX ADMIN — INSULIN LISPRO 2 UNITS: 100 INJECTION, SOLUTION INTRAVENOUS; SUBCUTANEOUS at 18:30

## 2025-04-10 RX ADMIN — INSULIN LISPRO 1 UNITS: 100 INJECTION, SOLUTION INTRAVENOUS; SUBCUTANEOUS at 08:11

## 2025-04-10 RX ADMIN — INSULIN LISPRO 2 UNITS: 100 INJECTION, SOLUTION INTRAVENOUS; SUBCUTANEOUS at 11:53

## 2025-04-10 RX ADMIN — BENZONATATE 100 MG: 100 CAPSULE ORAL at 22:17

## 2025-04-10 RX ADMIN — FINASTERIDE 5 MG: 5 TABLET, FILM COATED ORAL at 08:11

## 2025-04-10 RX ADMIN — GUAIFENESIN 600 MG: 600 TABLET ORAL at 22:17

## 2025-04-10 RX ADMIN — DEXAMETHASONE 6 MG: 4 TABLET ORAL at 08:11

## 2025-04-10 RX ADMIN — CITALOPRAM HYDROBROMIDE 20 MG: 20 TABLET ORAL at 08:10

## 2025-04-10 RX ADMIN — Medication 3 MG: at 01:59

## 2025-04-10 RX ADMIN — ENOXAPARIN SODIUM 40 MG: 100 INJECTION SUBCUTANEOUS at 08:10

## 2025-04-10 RX ADMIN — SODIUM CHLORIDE: 0.9 INJECTION, SOLUTION INTRAVENOUS at 01:48

## 2025-04-10 RX ADMIN — TAMSULOSIN HYDROCHLORIDE 0.4 MG: 0.4 CAPSULE ORAL at 01:00

## 2025-04-10 RX ADMIN — DEXAMETHASONE 6 MG: 4 TABLET ORAL at 00:54

## 2025-04-10 NOTE — PLAN OF CARE
Problem: Chronic Conditions and Co-morbidities  Goal: Patient's chronic conditions and co-morbidity symptoms are monitored and maintained or improved  Outcome: Progressing     Problem: Discharge Planning  Goal: Discharge to home or other facility with appropriate resources  Outcome: Progressing     Problem: Pain  Goal: Verbalizes/displays adequate comfort level or baseline comfort level  Outcome: Progressing     Problem: ABCDS Injury Assessment  Goal: Absence of physical injury  Outcome: Progressing     Problem: Safety - Adult  Goal: Free from fall injury  Outcome: Progressing     Problem: Respiratory - Adult  Goal: Achieves optimal ventilation and oxygenation  Outcome: Progressing     Problem: Cardiovascular - Adult  Goal: Maintains optimal cardiac output and hemodynamic stability  Outcome: Progressing  Goal: Absence of cardiac dysrhythmias or at baseline  Outcome: Progressing     Problem: Musculoskeletal - Adult  Goal: Return mobility to safest level of function  Outcome: Progressing  Goal: Return ADL status to a safe level of function  Outcome: Progressing     Problem: Metabolic/Fluid and Electrolytes - Adult  Goal: Electrolytes maintained within normal limits  Outcome: Progressing  Goal: Hemodynamic stability and optimal renal function maintained  Outcome: Progressing  Goal: Glucose maintained within prescribed range  Outcome: Progressing

## 2025-04-10 NOTE — PROGRESS NOTES
Occupational Therapy      [facility] - Occupational Therapy   Phone: [phone]    Occupational Therapy  Facility/Department:[address] MED SURG    [x] Initial Evaluation            [] Daily Treatment Note         [] Discharge Summary      Patient: Vinay Mandel   : 1930   MRN: [id number]   Date of Service:  4/10/2025    Staff Mobility Recommendation: walker x 1    AM-PAC Score:   Discharge Recommendations: home with home OT     Vinay Mandel scored a 19 on the AM-PAC ADL Inpatient form. Current research shows that an AM-PAC score of 18 or greater is typically associated with a discharge to the patient's home setting. Based on the patient's AM-PAC score, and their current ADL deficits, it is recommended that the patient have 2-3 sessions per week of Occupational Therapy at d/c to increase the patient's independence.  At this time, this patient demonstrates the endurance and safety to discharge home with home therapy and a follow up treatment frequency of 2-3x/wk.   Please see assessment section for further patient specific details.    If patient discharges prior to next session this note will serve as a discharge summary.  Please see below for the latest assessment towards goals.      Admitting Diagnosis:  COVID-19 virus infection  Ordering Physician: Dr. Roy  Current Admission Summary: 89 y/o male admitted 2025 with COVID. Fall, sepsis, bronchitis.     Past Medical History:  has a past medical history of Ankylosing spondylitis (HCC), Basal cell carcinoma, Diabetes mellitus type II, controlled (HCC), Hyperlipidemia, Hypertension, and Varicella.  Past Surgical History:  has a past surgical history that includes TURP; cervical fusion; and Cystoscopy (N/A, 10/28/2022).    Assessment  Activity Tolerance: Good; pt on 2L O2 and 97%. Pt on room air at baseline. Pt ambulated around room multiple times on room air and briefly desat to 89%, quickly recovers to 90% with rest break.   Impairments

## 2025-04-10 NOTE — CARE COORDINATION
Case Management Assessment  Initial Evaluation    Date/Time of Evaluation: 4/10/2025 2:06 PM  Assessment Completed by: Dara Key    If patient is discharged prior to next notation, then this note serves as note for discharge by case management.    Patient Name: Vinay Mandel                   YOB: 1930  Diagnosis: Acute bronchitis, unspecified organism [J20.9]  Sepsis, due to unspecified organism, unspecified whether acute organ dysfunction present (HCC) [A41.9]  Acute cough [R05.1]  COVID-19 virus infection [U07.1]  Bronchitis [J40]                   Date / Time: 4/9/2025  6:37 PM    Patient Admission Status: Inpatient   Readmission Risk (Low < 19, Mod (19-27), High > 27): No data recorded  Current PCP: Goldy Alfred MD  PCP verified by ? Yes    Chart Reviewed: Yes      History Provided by: Patient  Patient Orientation: Alert and Oriented    Patient Cognition: Alert    Hospitalization in the last 30 days (Readmission):  No    If yes, Readmission Assessment in  Navigator will be completed.    Advance Directives:      Code Status: Full Code   Patient's Primary Decision Maker is: Legal Next of Kin    Primary Decision Maker: Caitlyn Mandel - Child - [phone]    Discharge Planning:    Patient lives with: Alone Type of Home: House  Primary Care Giver: Self  Patient Support Systems include: Children, Friends/Neighbors   Current Financial resources: Medicare  Current community resources: None  Current services prior to admission: Durable Medical Equipment            Current DME: Cane            Type of Home Care services:  None    ADLS  Prior functional level: Independent in ADLs/IADLs  Current functional level: Independent in ADLs/IADLs    PT AM-PAC: 19 /24  OT AM-PAC:   /24    Family can provide assistance at DC: No  Would you like Case Management to discuss the discharge plan with any other family members/significant others, and if so, who? No  Plans to Return to Present Housing: Unknown  at present  Other Identified Issues/Barriers to RETURNING to current housing: Patient has 10 steps to get into home.  Potential Assistance needed at discharge: Durable Medical Equipment, Outpatient PT/OT            Potential DME: Walker  Patient expects to discharge to: House  Plan for transportation at discharge: Friends    Financial    Payor: [facility] MEDICARE / Plan: [facility] MEDICARE COMPLETE / Product Type: *No Product type* /     Does insurance require precert for SNF: Yes    Potential assistance Purchasing Medications: No  Meds-to-Beds request:        [id number] - [place], [place] - [address] - P [phone] - F [phone]  [address]  [facility] [address]  Phone: [phone] Fax: [phone]    [facility] - [facility] [address] [facility] - P [phone] - F [phone]  [address] [facility] [address]  Phone: [phone] Fax: [phone]    [id number] - [place], [place] - [address] - P [phone] - F [phone]  [address]  [facility] [address]  Phone: [phone] Fax: [phone]      Notes:    Factors facilitating achievement of predicted outcomes: Friend support, Motivated, Cooperative, Pleasant, and Sense of humor    Barriers to discharge: Decreased endurance and Medical complications    Additional Case Management Notes: Patient plans to discharge home with C - PT.  Potential DME needed - walker.  Provided patient with C list.  Patient's friend will transport.    The Plan for Transition of Care is related to the following treatment goals of Acute bronchitis, unspecified organism [J20.9]  Sepsis, due to unspecified organism, unspecified whether acute organ dysfunction present (HCC) [A41.9]  Acute cough [R05.1]  COVID-19 virus infection [U07.1]  Bronchitis [J40]    IF APPLICABLE: The Patient and/or patient representative Vinay and his family were provided with a choice of provider and agrees

## 2025-04-10 NOTE — PLAN OF CARE
Problem: Chronic Conditions and Co-morbidities  Goal: Patient's chronic conditions and co-morbidity symptoms are monitored and maintained or improved  Outcome: Progressing     Problem: Discharge Planning  Goal: Discharge to home or other facility with appropriate resources  Outcome: Progressing  Flowsheets (Taken 4/9/2025 4334 by Leticia Santos, RN)  Discharge to home or other facility with appropriate resources:   Identify barriers to discharge with patient and caregiver   Arrange for needed discharge resources and transportation as appropriate     Problem: Pain  Goal: Verbalizes/displays adequate comfort level or baseline comfort level  Outcome: Progressing     Problem: ABCDS Injury Assessment  Goal: Absence of physical injury  Outcome: Progressing     Problem: Safety - Adult  Goal: Free from fall injury  Outcome: Progressing     Problem: Respiratory - Adult  Goal: Achieves optimal ventilation and oxygenation  Outcome: Progressing     Problem: Cardiovascular - Adult  Goal: Maintains optimal cardiac output and hemodynamic stability  Outcome: Progressing  Goal: Absence of cardiac dysrhythmias or at baseline  Outcome: Progressing     Problem: Musculoskeletal - Adult  Goal: Return mobility to safest level of function  Outcome: Progressing  Goal: Return ADL status to a safe level of function  Outcome: Progressing     Problem: Metabolic/Fluid and Electrolytes - Adult  Goal: Electrolytes maintained within normal limits  Outcome: Progressing  Goal: Hemodynamic stability and optimal renal function maintained  Outcome: Progressing  Goal: Glucose maintained within prescribed range  Outcome: Progressing

## 2025-04-10 NOTE — H&P
V2.0  History and Physical      Name:  Vinay Mandel /Age/Sex: 1930  (89 y.o. male)   MRN & CSN:  [id number] & [id number] Encounter Date/Time: 2025 10:21 PM EDT   Location:  [address] [place] [address] PCP: Goldy Alfred MD       Hospital Day: 1    Assessment and Plan:   Vinay Mandel is a 89 y.o. male with a pmh of hypertension and permanent pacemaker who presents with COVID-19 virus infection    Hospital Problems           Last Modified POA    * (Principal) COVID-19 virus infection 2025 Yes    COVID-19 2025 Yes    Diarrhea 2025 Yes    Fall 2025 Yes    Elevated troponin 2025 Yes    SIRS (systemic inflammatory response syndrome) ([facility]) 2025 Yes    Primary hypertension 2025 Yes    CKD stage 3a, GFR 45-59 ml/min ([facility]) 2025 Yes    Hypoxia 2025 Yes    Hyponatremia 2025 Yes    Hypochloremia 2025 Yes    Diabetes mellitus type II, controlled ([facility]) 2025 Yes       Plan:  Enhanced COVID precautions  Gentle IV hydration  As needed Tylenol for fever and pain  Start Decadron for hypoxia.  CKD stage IIIa  Stable.  Trend BMP  History of hypertension  Patient blood pressure is soft.  Hold home blood pressure medications.  Hyponatremia-sodium of 135 (normal 136-145) gentle IV hydration  Hypochloremia-chloride of 97 (normal 99-1 10)-gentle IV hydration  Diarrhea-IV hydration as above.  Check magnesium level.  Fall-PT and OT consults.  Check vitamin D level.  Diabetes mellitus type 2, controlled.  Blood glucose is within goal.  Hold home metformin.  Accu-Chek with  correction scale insulin ordered.        Advance care planning:  Advanced care planning was discussed with patient for a total of  16 minutes.  Full code, DNR CC, DNR CCA, power of  and living will were discussed.  Patient elected to be a full code.   Disposition:   Current Living situation: Home  Expected Disposition: Home  Estimated D/C: 2 days    Diet Regular diet   DVT Prophylaxis [x] Lovenox, []  Heparin, [] SCDs,

## 2025-04-10 NOTE — PROGRESS NOTES
[facility] - Physical Therapy   Phone: [phone]    Physical Therapy  Facility/Department:[address] MED SURG    [x] Initial Evaluation            [] Daily Treatment Note         [] Discharge Summary      Patient: Vinay Mandel   : 1930   MRN: [id number]   Date of Service:  4/10/2025  Staff Mobility Recommendation: Walker, assist x 1.     AM-PAC score:   Discharge Recommendations: Home; Home PT    Vinay Mandel scored a 19 on the AM-PAC short mobility form. Current research shows that an AM-PAC score of 18 or greater is typically associated with a discharge to the patient's home setting. Based on the patient's AM-PAC score and their current functional mobility deficits, it is recommended that the patient have 2-3 sessions per week of Physical Therapy at d/c to increase the patient's independence.  At this time, this patient demonstrates the endurance and safety to discharge home with Home PT Evaluation and a follow up treatment frequency of 2-3x/wk.  Please see assessment section for further patient specific details.    If patient discharges prior to next session this note will serve as a discharge summary.  Please see below for the latest assessment towards goals.       Admitting Diagnosis: COVID-19 virus infection  Ordering Physician: Dr. Hernandez  Current Admission Summary: 89 y/o male admit 2025 with Acute Bronchitis, Sepsis, Elevated Troponin, COVID+.  Fall few days pta.  X-Rays L Spine and Pelvis : negative.  PMH as noted including CAD, AV Block, Pacemaker, PAF/Watchman, C-Spine Fx/Fusion.    Past Medical History:  has a past medical history of Ankylosing spondylitis (HCC), Basal cell carcinoma, Diabetes mellitus type II, controlled (HCC), Hyperlipidemia, Hypertension, and Varicella.  Past Surgical History:  has a past surgical history that includes TURP; cervical fusion; and Cystoscopy (N/A, 10/28/2022).    Assessment  Activity Tolerance: Good  Impairments Requiring

## 2025-04-10 NOTE — ED NOTES
ED SBAR report provider to KelW, RN. Patient to be transported to Room 4117 via stretcher by RN.Patient transported with bedside cardiac monitor. IV site clean, dry, and intact. Updated patient and family on plan of care.

## 2025-04-10 NOTE — PROGRESS NOTES
Medication Reconciliation    List of medications patient is currently taking is complete.     Source of information: 1. Conversation with patient at bedside                                      2. EPIC records      Allergies  Penicillins     Notes regarding home medications:     1. Patient received all of his morning home medications prior to arrival to the emergency department.      Laila Kenny, Pharmacy Intern  4/9/2025 10:17 PM

## 2025-04-10 NOTE — PROGRESS NOTES
4 Eyes Skin Assessment     NAME:  Vinay Mandel  YOB: 1930  MEDICAL RECORD NUMBER:  [id number]    The patient is being assessed for  Admission    I agree that at least one RN has performed a thorough Head to Toe Skin Assessment on the patient. ALL assessment sites listed below have been assessed.      Areas assessed by both nurses:    Head, Face, Ears, Shoulders, Back, Chest, Arms, Elbows, Hands, Sacrum. Buttock, Coccyx, Ischium, Legs. Feet and Heels, and Under Medical Devices         Does the Patient have a Wound? No noted wound(s)       [place] Prevention initiated by RN: No  Wound Care Orders initiated by RN: No    Pressure Injury (Stage 3,4, Unstageable, DTI, NWPT, and Complex wounds) if present, place Wound referral order by RN under : No    New Ostomies, if present place, Ostomy referral order under : No     Nurse 1 eSignature: Electronically signed by Leticia Santos RN on 4/9/25 at 11:52 PM EDT    **SHARE this note so that the co-signing nurse can place an eSignature**    Nurse 2 eSignature: Electronically signed by Myla Neff RN on 4/10/25 at 2:29 AM EDT

## 2025-04-10 NOTE — PROGRESS NOTES
Admitted patient to room 4117 from the Emergency Room with weakness and need for oxygen. Patient's breathing regular 2 L of oxygen and unlabored, denies pain. Oriented to room, call light, TV, phone, patient rights and responsibilities. Bed in lowest position and locked, exit alarm in place. Non-slip socks on. ID bracelet on and correct per pt verbally reporting name and date of birth. Call light within reach. Needed items within reach.

## 2025-04-10 NOTE — CARE COORDINATION
Advance Care Planning   Healthcare Decision Maker:    Primary Decision Maker: Caitlyn Mandel - [facility] - [phone]      Today we documented Decision Maker(s) consistent with ACP documents on file.    Electronically signed by Dara Key on 4/10/2025 at 2:09 PM

## 2025-04-10 NOTE — PROGRESS NOTES
12/06/2024 11:29 AM    HDL 59 02/06/2012 09:24 AM    TRIG 101 12/06/2024 11:29 AM     Hemoglobin A1C:   Lab Results   Component Value Date/Time    LABA1C 7.9 12/06/2024 11:29 AM     TSH:   Lab Results   Component Value Date/Time    TSH 2.68 11/27/2023 10:39 AM     Troponin: No results found for: \"TROPONINT\"  Lactic Acid: No results for input(s): \"LACTA\" in the last 72 hours.  BNP: No results for input(s): \"PROBNP\" in the last 72 hours.  UA:  Lab Results   Component Value Date/Time    NITRU Negative 04/09/2025 08:21 PM    COLORU Yellow 04/09/2025 08:21 PM    PHUR 5.5 04/09/2025 08:21 PM    PHUR Color Interfer 10/27/2022 04:07 PM    WBCUA 0 04/09/2025 08:21 PM    RBCUA 1 04/09/2025 08:21 PM    BACTERIA None Seen 04/09/2025 08:21 PM    CLARITYU Clear 04/09/2025 08:21 PM    LEUKOCYTESUR Negative 04/09/2025 08:21 PM    UROBILINOGEN 0.2 04/09/2025 08:21 PM    BILIRUBINUR Negative 04/09/2025 08:21 PM    BLOODU TRACE 04/09/2025 08:21 PM    GLUCOSEU Negative 04/09/2025 08:21 PM    KETUA 15 04/09/2025 08:21 PM     Urine Cultures:   Lab Results   Component Value Date/Time    LABURIN 25,000 CFU/ml 07/16/2019 10:00 AM     Blood Cultures: No results found for: \"BC\"  No results found for: \"BLOODCULT2\"  Organism:   Lab Results   Component Value Date/Time    ORG Proteus mirabilis 07/16/2019 10:00 AM         Electronically signed by Juan Antonio Roy DO on 4/10/2025 at 7:47 AM

## 2025-04-10 NOTE — PROGRESS NOTES
Pt is alert and oriented x4. Vital signs stable. Heart rhythm A flutter. HR 52. Pt stated he has a history of A flutter. Notified Darren Resendez MD.

## 2025-04-11 VITALS
SYSTOLIC BLOOD PRESSURE: 123 MMHG | TEMPERATURE: 97.7 F | OXYGEN SATURATION: 97 % | BODY MASS INDEX: 23.96 KG/M2 | HEIGHT: 73 IN | WEIGHT: 180.78 LBS | HEART RATE: 56 BPM | RESPIRATION RATE: 17 BRPM | DIASTOLIC BLOOD PRESSURE: 62 MMHG

## 2025-04-11 LAB
ANION GAP SERPL CALCULATED.3IONS-SCNC: 10 MMOL/L (ref 3–16)
BUN SERPL-MCNC: 41 MG/DL (ref 7–20)
CALCIUM SERPL-MCNC: 8.2 MG/DL (ref 8.3–10.6)
CHLORIDE SERPL-SCNC: 104 MMOL/L (ref 99–110)
CO2 SERPL-SCNC: 23 MMOL/L (ref 21–32)
CREAT SERPL-MCNC: 1.4 MG/DL (ref 0.8–1.3)
GFR SERPLBLD CREATININE-BSD FMLA CKD-EPI: 46 ML/MIN/{1.73_M2}
GLUCOSE BLD-MCNC: 204 MG/DL (ref 70–99)
GLUCOSE BLD-MCNC: 225 MG/DL (ref 70–99)
GLUCOSE BLD-MCNC: 230 MG/DL (ref 70–99)
GLUCOSE BLD-MCNC: 343 MG/DL (ref 70–99)
GLUCOSE SERPL-MCNC: 265 MG/DL (ref 70–99)
PERFORMED ON: ABNORMAL
POTASSIUM SERPL-SCNC: 3.9 MMOL/L (ref 3.5–5.1)
SODIUM SERPL-SCNC: 137 MMOL/L (ref 136–145)

## 2025-04-11 PROCEDURE — 6370000000 HC RX 637 (ALT 250 FOR IP): Performed by: HOSPITALIST

## 2025-04-11 PROCEDURE — 36415 COLL VENOUS BLD VENIPUNCTURE: CPT

## 2025-04-11 PROCEDURE — 2500000003 HC RX 250 WO HCPCS: Performed by: HOSPITALIST

## 2025-04-11 PROCEDURE — 6360000002 HC RX W HCPCS: Performed by: HOSPITALIST

## 2025-04-11 PROCEDURE — 2580000003 HC RX 258: Performed by: HOSPITALIST

## 2025-04-11 PROCEDURE — 6370000000 HC RX 637 (ALT 250 FOR IP): Performed by: STUDENT IN AN ORGANIZED HEALTH CARE EDUCATION/TRAINING PROGRAM

## 2025-04-11 PROCEDURE — 97116 GAIT TRAINING THERAPY: CPT

## 2025-04-11 PROCEDURE — 80048 BASIC METABOLIC PNL TOTAL CA: CPT

## 2025-04-11 PROCEDURE — 97530 THERAPEUTIC ACTIVITIES: CPT

## 2025-04-11 RX ORDER — INSULIN LISPRO 100 [IU]/ML
0-8 INJECTION, SOLUTION INTRAVENOUS; SUBCUTANEOUS
Status: DISCONTINUED | OUTPATIENT
Start: 2025-04-11 | End: 2025-04-11 | Stop reason: HOSPADM

## 2025-04-11 RX ADMIN — INSULIN LISPRO 1 UNITS: 100 INJECTION, SOLUTION INTRAVENOUS; SUBCUTANEOUS at 05:59

## 2025-04-11 RX ADMIN — SODIUM CHLORIDE, PRESERVATIVE FREE 10 ML: 5 INJECTION INTRAVENOUS at 08:56

## 2025-04-11 RX ADMIN — ASPIRIN 81 MG: 81 TABLET, CHEWABLE ORAL at 08:58

## 2025-04-11 RX ADMIN — INSULIN LISPRO 2 UNITS: 100 INJECTION, SOLUTION INTRAVENOUS; SUBCUTANEOUS at 11:48

## 2025-04-11 RX ADMIN — CITALOPRAM HYDROBROMIDE 20 MG: 20 TABLET ORAL at 08:58

## 2025-04-11 RX ADMIN — ENOXAPARIN SODIUM 40 MG: 100 INJECTION SUBCUTANEOUS at 08:56

## 2025-04-11 RX ADMIN — INSULIN LISPRO 3 UNITS: 100 INJECTION, SOLUTION INTRAVENOUS; SUBCUTANEOUS at 00:38

## 2025-04-11 RX ADMIN — DEXAMETHASONE 6 MG: 4 TABLET ORAL at 09:00

## 2025-04-11 RX ADMIN — BENZONATATE 100 MG: 100 CAPSULE ORAL at 09:06

## 2025-04-11 RX ADMIN — SODIUM CHLORIDE: 0.9 INJECTION, SOLUTION INTRAVENOUS at 06:05

## 2025-04-11 RX ADMIN — GUAIFENESIN 600 MG: 600 TABLET ORAL at 08:58

## 2025-04-11 RX ADMIN — ROSUVASTATIN CALCIUM 20 MG: 20 TABLET, FILM COATED ORAL at 08:56

## 2025-04-11 RX ADMIN — FINASTERIDE 5 MG: 5 TABLET, FILM COATED ORAL at 08:58

## 2025-04-11 NOTE — CARE COORDINATION
DISCHARGE SUMMARY     DATE OF DISCHARGE: 4/11/25    DISCHARGE DESTINATION: Home    HOME CARE: Yes  Discharging to Facility/ Agency   Name: [facility]  Address:  [address] [place] [address]  Phone:  [phone]  Fax:  [phone]     HEMODIALYSIS: No    TRANSPORTATION: Private Car    NEW DME ORDERED: no    COMMENTS[facility]. Friend will transport.Electronically signed by Gretchen Kaur RN on 4/11/2025 at 12:39 PM

## 2025-04-11 NOTE — PROGRESS NOTES
Pt given discharge instructions. IV removed w/o complications. Pt will be discharged home with friend. All needs met at this time.

## 2025-04-11 NOTE — DISCHARGE INSTR - COC
Continuity of Care Form    Patient Name: Vinay Mandel   :  1930  MRN:  [id number]    Admit date:  2025  Discharge date:  ***    Code Status Order: Full Code   Advance Directives:     Admitting Physician:  Juan Antonio TOLLIVER DO  PCP: Goldy Alfred MD    Discharging Nurse: ***  Discharging Hospital Unit/Room#: T6V-9146/4117-01  Discharging Unit Phone Number: ***    Emergency Contact:   Extended Emergency Contact Information  Primary Emergency Contact: Araceli An  Home Phone: [phone]  Mobile Phone: [phone]  Relation: Friend  Preferred language: English  Secondary Emergency Contact: Caitlyn Mandel  Mobile Phone: [phone]  Relation: Child    Past Surgical History:  Past Surgical History:   Procedure Laterality Date    CERVICAL FUSION      CYSTOSCOPY N/A 10/28/2022    CYSTOSCOPY EVACUATION OF CLOTS performed by Monica Arango MD at 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, init ([facility]) S14.129A    Fall against object W18.00XA    1st degree AV block I44.0    Gross hematuria R31.0    COVID-19 U07.1    Diarrhea R19.7    Fall W19.XXXA    Elevated troponin R79.89    COVID-19 virus infection U07.1    SIRS (systemic inflammatory response syndrome) ([facility]) R65.10    Primary hypertension I10    CKD stage 3a, GFR 45-59 ml/min ([facility]) N18.31    Hypoxia R09.02    Hyponatremia E87.1    Hypochloremia E87.8    Diabetes mellitus type II, controlled ([facility]) E11.9    Bronchitis  Date:[id number]}    Treatments at the Time of Hospital Discharge:   Respiratory Treatments: ***  Oxygen Therapy:  {Therapy; copd oxygen:54897}  Ventilator:    {[facility] Vent List:924225883}    Rehab Therapies: {THERAPEUTIC INTERVENTION:2382809334}  Weight Bearing Status/Restrictions: {[facility] Weight Bearin}  Other Medical Equipment (for information only, NOT a DME order):  {EQUIPMENT:390281216}  Other Treatments: ***    Patient's personal belongings (please select all that are sent with patient):  {[facility] DME Belongings:508012555}    RN SIGNATURE:  {Esignature:468413615}    CASE MANAGEMENT/SOCIAL WORK SECTION    Inpatient Status Date: 4/10/25    Readmission Risk Assessment Score:  [facility] RISK OF UNPLANNED READMISSION 2.0             13.4 Total Score        Discharging to Facility/ Agency   Name: Discharging to Facility/ Agency   Name: [facility]  Address:  [address] [place] [address]  Phone:  [phone]  Fax:  [phone]     / signature: Electronically signed by Gretchen Kaur RN on 25 at 12:34 PM EDT    PHYSICIAN SECTION    Prognosis: Good    Condition at Discharge: Stable    Rehab Potential (if transferring to Rehab): N/A    Recommended Labs or Other Treatments After Discharge: Physical therapy and occupational therapy    Physician Certification: I certify the above information and transfer of Vinay Mandel  is necessary for the continuing treatment of the diagnosis listed and that he requires Home Care for less 30 days.     Update Admission H&P: No change in H&P    PHYSICIAN SIGNATURE:  Electronically signed by Juan Antonio Roy DO on 25 at 12:39 PM EDT

## 2025-04-11 NOTE — DISCHARGE INSTRUCTIONS
Continue to hold two of your blood pressure medications, the candesartan and hydrochlorothiazide until you follow up with your PCP.

## 2025-04-11 NOTE — DISCHARGE SUMMARY
V2.0  Discharge Summary    Name:  Vinay Mandel /Age/Sex: 1930 (89 y.o. male)   Admit Date: 2025  Discharge Date: 25    MRN & CSN:  [id number] & [id number] Encounter Date and Time 25 11:05 AM EDT    Attending:  Juan Antonio Roy DO Discharging Provider: Juan Antonio Roy DO       Hospital Course:     Brief HPI: Vinay Mandel is a 89 y.o. male with a pertinent PMHx of T2DM, HTN, HLD who presented complaining of cough and generalized weakness.  In the ED he was found to be positive for COVID-19 and was requiring supplemental oxygen to maintain O2 sat.    Brief Problem Based Course:     COVID-19 infection  - Successfully weaned off supplemental oxygen  - Discontinue Decadron  - PT and OT recommending home care    Type 2 diabetes with hyperglycemia  - Most likely secondary to steroids, should improve  - Continue home diabetes regimen    Acute kidney injury  - S/p IV fluids  - Holding candesartan, HCTZ  - Outpatient follow-up with PCP    The patient expressed appropriate understanding of, and agreement with the discharge recommendations, medications, and plan.     Consults this admission:  IP CONSULT TO HOME CARE NEEDS    Discharge Diagnosis:   COVID-19 virus infection      Discharge Instruction:   Follow up appointments: PCP  Primary care physician: Goldy Alfred MD within 1 week  Diet: regular diet   Activity: activity as tolerated  Disposition: Discharged to:   [x]Home, []C, []SNF, []Acute Rehab, []Hospice   Condition on discharge: Stable  Labs and Tests to be Followed up as an outpatient by PCP or Specialist:     Discharge Medications:        Medication List        PAUSE taking these medications      candesartan 32 MG tablet  Wait to take this until your doctor or other care provider tells you to start again.  Commonly known as: ATACAND     hydroCHLOROthiazide 25 MG tablet  Wait to take this until your doctor or other care provider tells you to start again.  Commonly known as:

## 2025-04-11 NOTE — PROGRESS NOTES
[facility] - Physical Therapy   Phone: [phone]    Physical Therapy  Facility/Department:[address] MED SURG    [] Initial Evaluation            [x] Daily Treatment Note         [] Discharge Summary      Patient: Vinay Mandel   : 1930   MRN: [id number]   Date of Service:  2025  Staff Mobility Recommendation: Supervision only.     AM-PAC score: 23/24  Discharge Recommendations: Home; Home PT    Vinay Mandel scored a 23/24 on the AM-PAC short mobility form. Current research shows that an AM-PAC score of 18 or greater is typically associated with a discharge to the patient's home setting. Based on the patient's AM-PAC score and their current functional mobility deficits, it is recommended that the patient have 2-3 sessions per week of Physical Therapy at d/c to increase the patient's independence.  At this time, this patient demonstrates the endurance and safety to discharge home with Home PT Evaluation and a follow up treatment frequency of 2-3x/wk.  Please see assessment section for further patient specific details.    If patient discharges prior to next session this note will serve as a discharge summary.  Please see below for the latest assessment towards goals.       Admitting Diagnosis: COVID-19 virus infection  Ordering Physician: Dr. Hernandez  Current Admission Summary: 89 y/o male admit 2025 with Acute Bronchitis, Sepsis, Elevated Troponin, COVID+.  Fall few days pta.  X-Rays L Spine and Pelvis : negative.  PMH as noted including CAD, AV Block, Pacemaker, PAF/Watchman, C-Spine Fx/Fusion.    Past Medical History:  has a past medical history of Ankylosing spondylitis (HCC), Basal cell carcinoma, Diabetes mellitus type II, controlled (HCC), Hyperlipidemia, Hypertension, and Varicella.  Past Surgical History:  has a past surgical history that includes TURP; cervical fusion; and Cystoscopy (N/A, 10/28/2022).    Assessment  Activity Tolerance: Good  Impairments Requiring Therapeutic  dynamic balance in unsupported position  Static Standing Balance: good: independent with functional balance in unsupported position  Dynamic Standing Balance: good: independent with functional balance in unsupported position    Exercise:   No exercises performed this session.       Cognition  WFL  Orientation:    alert and oriented x 4    Education  Barriers To Learning: none  Patient Education: patient educated on goals, PT role and benefits, plan of care, general safety, functional mobility training  Learning Assessment:  patient verbalizes understanding, would benefit from continued reinforcement  Safety Interventions: call light within reach, patient left in chair, chair alarm in place, gait belt, and nurse notified    Plan  Frequency: 3-5 x/week  Current Treatment Recommendations: strengthening, functional mobility training, transfer training, gait training, endurance training, patient/caregiver education, and safety education    Goals  Patient Goals: Return home.    Short Term Goals:  Time Frame: By acute discharge  Patient will complete bed mobility with Independent   Goal met.    Patient will complete transfers with Independent   Goal met.    Patient will ambulate 50-75 ft with use of LRAD with Independent   Goal met.        Above goals reviewed on 2025.  All goals are ongoing at this time unless indicated above.      Therapy Session Time      Individual Group Co-treatment   Time In  1020       Time Out  1054       Minutes  24           Timed Code Treatment Minutes:   24 min   Total Treatment Minutes:  24 minutes    Minutes per charge:  Therapeutic activity: 8 minutes  Gait trainin minutes      Electronically signed by Hoa Rios PT on 2025 at 11:48 AM

## 2025-04-11 NOTE — PLAN OF CARE
Problem: Chronic Conditions and Co-morbidities  Goal: Patient's chronic conditions and co-morbidity symptoms are monitored and maintained or improved  4/11/2025 0114 by Uriel Alcantara RN  Outcome: Progressing  4/10/2025 1900 by Ruchi Arroyo RN  Outcome: Progressing     Problem: Discharge Planning  Goal: Discharge to home or other facility with appropriate resources  4/11/2025 0114 by Uriel Alcantara RN  Outcome: Progressing  4/10/2025 1900 by Ruchi Arroyo RN  Outcome: Progressing     Problem: Pain  Goal: Verbalizes/displays adequate comfort level or baseline comfort level  4/11/2025 0114 by Uriel Alcantara RN  Outcome: Progressing  4/10/2025 1900 by Ruchi Arroyo RN  Outcome: Progressing     Problem: ABCDS Injury Assessment  Goal: Absence of physical injury  4/11/2025 0114 by Uriel Alcantara RN  Outcome: Progressing  4/10/2025 1900 by Ruchi Arroyo RN  Outcome: Progressing     Problem: Safety - Adult  Goal: Free from fall injury  4/11/2025 0114 by Uriel Alcantara RN  Outcome: Progressing  4/10/2025 1900 by Ruchi Arroyo RN  Outcome: Progressing     Problem: Respiratory - Adult  Goal: Achieves optimal ventilation and oxygenation  4/11/2025 0114 by Uriel Alcantara RN  Outcome: Progressing  4/10/2025 1900 by Ruchi Arroyo RN  Outcome: Progressing     Problem: Cardiovascular - Adult  Goal: Maintains optimal cardiac output and hemodynamic stability  4/11/2025 0114 by Uriel Alcantara RN  Outcome: Progressing  4/10/2025 1900 by Ruchi Arroyo RN  Outcome: Progressing  Goal: Absence of cardiac dysrhythmias or at baseline  4/11/2025 0114 by Uriel Alcantara RN  Outcome: Progressing  4/10/2025 1900 by Ruchi Arroyo RN  Outcome: Progressing     Problem: Musculoskeletal - Adult  Goal: Return mobility to safest level of function  4/11/2025 0114 by Suing, Airen May, RN  Outcome: Progressing  4/10/2025 1900 by Ruchi Arroyo,

## 2025-04-13 LAB
BACTERIA BLD CULT ORG #2: NORMAL
BACTERIA BLD CULT: NORMAL

## 2025-04-14 ENCOUNTER — RESULTS FOLLOW-UP (OUTPATIENT)
Dept: EMERGENCY DEPT | Age: 89
End: 2025-04-14
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
## 2025-05-09 PROBLEM — R79.89 ELEVATED TROPONIN: Status: RESOLVED | Noted: 2025-04-09 | Resolved: 2025-05-09

## 2025-07-09 NOTE — PROGRESS NOTES
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 He underwent occiput-C4 fusion (7/6 with Dr. Karen Means). Post-op course notable for pulmonary edema requiring diuresis, new onset dysphagia, and significant troponin elevation/possible NSTEMI (managed medically). Now presents to ARU with impaired mobility and self-care below his baseline. Currently, he reports improvement in upper extremity weakness/paresthesias. He feels his LUE has returned to baseline but he has persistent abnormal sensation in the right hand, weakness throughout the right arm, and difficulty with fine motor coordination. He reports poor balance but no focal leg weakness or numbness. He has moderate neck pain with radiation to the right shoulder. Described mostly as \"tightness. \"\" (Cindra Downer 7/12/19)  Response To Previous Treatment: Patient with no complaints from previous session. Family / Caregiver Present: No  Referring Practitioner: Dr. Demarco : Pt very pleasant and agreeable to PT treatment session this date, states slept well last night. Pt states pain down this morning currently 3/10 with neck/shoulders. COmplains brace discomfort throughout session. Orientation  Orientation  Overall Orientation Status: Within Normal Limits  Objective      Transfers  Sit to Stand: Stand by assistance(increased time throughout session with VC for hand placement )  Stand to sit: Stand by assistance  Ambulation  Ambulation?: Yes  Ambulation 1  Surface: level tile  Device: Rolling Walker  Assistance: Supervision;Stand by assistance  Quality of Gait: pushes RW slightly too far forward with multiple VC for safety, poor RLE clearance with turning with increased time but overall increased tolerance   Gait Deviations: Decreased step length;Shuffles; Deviated path;Decreased head and trunk rotation; Slow No Rupinder;Decreased step height  Distance: 300' with multipl turns, short distancs in therapy gym; 250' uneven surfaces increased time with VC for proper seqeuncing RW on uneven surfaces   Comments: periodic cues for proper use of RW   Ambulation 2  Surface - 2: level tile  Device 2: No device  Assistance 2: Contact guard assistance  Quality of Gait 2: narrow DAIJA, increased guarding compared to RW but able to cues promoting increased step height/length this date with cues for increased arm swing, decreased rupinder, without LOB   Gait Deviations: Slow Rupinder; Shuffles; Deviated path;Decreased head and trunk rotation;Decreased arm swing;Decreased step length  Distance: 180' with multiple turns   Comments: despite increased tolerance this date continue to recommend RW for stability and ease of ambulation due to increased work of ambulating without out device. (perform outside of steps--> increased walking distances between ascending/descending steps.)   Stairs/Curb  Stairs?: Yes  Stairs  # Steps : 12  Stairs Height: 6\"  Rails: (unilateral alteranting side)  Curbs: 6\"(X 1 trial with RW SBA with VC for proper seqeuncing )  Device: No Device  Assistance: Stand by assistance;Contact guard assistance  Comment: reciprocal pattern ascending/ reciprocal descending first 3 steps but with lateral lean towards left needing VC for safety changing to nonreciprocal for last 9 steps, cues to slow down for safety, presents with flexed posture bilateral hips and knees throughout but without LOB. Balance  Sitting - Static: Good  Sitting - Dynamic: Good  Standing - Static: Good  Standing - Dynamic: Good;-  Comments: increased stability with ambulation and confiedence overall   Exercises  Comments: above exercises performed standing at RW X 10 BLEs SBA including B heel/toe raises, marching, hip abduction, hip extension with knee straight, HS curls, hip extension knee straight, and mini squats.    Other exercises  Other exercises?: on 7/13/2019 at 9:58 AM

## 2025-07-18 ENCOUNTER — TELEPHONE (OUTPATIENT)
Dept: CARDIOLOGY CLINIC | Age: 89
End: 2025-07-18

## 2025-07-18 NOTE — TELEPHONE ENCOUNTER
Patient is a new referral from Dr. Amaya for arterial work up secondary to chronic wound.   Please call and offer appt with Dr. Engle Friday 7/25 at [place] at 315 pm  Thanks

## 2025-07-25 ENCOUNTER — OFFICE VISIT (OUTPATIENT)
Dept: CARDIOLOGY CLINIC | Age: 89
End: 2025-07-25
Payer: MEDICARE

## 2025-07-25 VITALS
WEIGHT: 183 LBS | HEIGHT: 73 IN | SYSTOLIC BLOOD PRESSURE: 126 MMHG | HEART RATE: 56 BPM | OXYGEN SATURATION: 96 % | BODY MASS INDEX: 24.25 KG/M2 | DIASTOLIC BLOOD PRESSURE: 58 MMHG

## 2025-07-25 DIAGNOSIS — L84 CALLUS: Primary | ICD-10-CM

## 2025-07-25 PROCEDURE — 1159F MED LIST DOCD IN RCRD: CPT | Performed by: INTERNAL MEDICINE

## 2025-07-25 PROCEDURE — 99204 OFFICE O/P NEW MOD 45 MIN: CPT | Performed by: INTERNAL MEDICINE

## 2025-07-25 PROCEDURE — 1123F ACP DISCUSS/DSCN MKR DOCD: CPT | Performed by: INTERNAL MEDICINE

## 2025-07-25 PROCEDURE — G8420 CALC BMI NORM PARAMETERS: HCPCS | Performed by: INTERNAL MEDICINE

## 2025-07-25 PROCEDURE — G8427 DOCREV CUR MEDS BY ELIG CLIN: HCPCS | Performed by: INTERNAL MEDICINE

## 2025-07-25 PROCEDURE — 1036F TOBACCO NON-USER: CPT | Performed by: INTERNAL MEDICINE

## 2025-07-25 NOTE — PROGRESS NOTES
,                     Interventional Cardiology Consultation     Vinay Mandel  12/24/1930    PCP: Goldy Alfred MD  Referring Physician: Dr. Amaya  Reason for Referral: chronic wound  Chief Complaint: \"I'm not exactly sure why I'm here\"  Chief Complaint   Patient presents with    New Patient    Other     Wound on left foot       Subjective:   History of Present Illness: The patient is 89 y.o. male with a past medical history significant for HTN, HLD, pAF s/p watchman DEANNE closure device 6/21/22 per , and DM2. Patient presents today as new referral for arterial work up. Patient reports recent callus on his left foot for which she has been following with Dr. Amaya for. States she was concerned with the callus prompting referral here. He denies any claudication. He only notes pain associated with the callus.           Past Medical History:   Diagnosis Date    Ankylosing spondylitis (HCC)     Basal cell carcinoma     Diabetes mellitus type II, controlled (HCC)     Hyperlipidemia     Hypertension     Varicella      Past Surgical History:   Procedure Laterality Date    CERVICAL FUSION      CYSTOSCOPY N/A 10/28/2022    CYSTOSCOPY EVACUATION OF CLOTS performed by Monica Arango MD at [facility] OR    [facility]       Family History   Problem Relation Age of Onset    Stroke Father     Cancer Sister      Social History     Tobacco Use    Smoking status: Never    Smokeless tobacco: Never   Vaping Use    Vaping status: Never Used   Substance Use Topics    Alcohol use: Not Currently    Drug use: Never       Allergies   Allergen Reactions    Penicillins Hives       Current Outpatient Medications   Medication Sig Dispense Refill    metFORMIN (GLUCOPHAGE) 1000 MG tablet Take 1 tablet by mouth 2 times daily (with meals)      rosuvastatin (CRESTOR) 20 MG tablet Take 1 tablet by mouth daily      tamsulosin (FLOMAX) 0.4 MG capsule Take 1 capsule by mouth at bedtime      aspirin 81 MG chewable tablet Take 1 tablet by mouth daily      finasteride

## 2025-08-20 SDOH — HEALTH STABILITY: PHYSICAL HEALTH: ON AVERAGE, HOW MANY MINUTES DO YOU ENGAGE IN EXERCISE AT THIS LEVEL?: 30 MIN

## 2025-08-20 SDOH — HEALTH STABILITY: PHYSICAL HEALTH: ON AVERAGE, HOW MANY DAYS PER WEEK DO YOU ENGAGE IN MODERATE TO STRENUOUS EXERCISE (LIKE A BRISK WALK)?: 1 DAY

## 2025-08-22 ENCOUNTER — OFFICE VISIT (OUTPATIENT)
Dept: INTERNAL MEDICINE CLINIC | Age: 89
End: 2025-08-22
Payer: MEDICARE

## 2025-08-22 VITALS
WEIGHT: 182 LBS | DIASTOLIC BLOOD PRESSURE: 70 MMHG | HEART RATE: 60 BPM | OXYGEN SATURATION: 97 % | SYSTOLIC BLOOD PRESSURE: 124 MMHG | BODY MASS INDEX: 24.01 KG/M2

## 2025-08-22 DIAGNOSIS — I10 PRIMARY HYPERTENSION: ICD-10-CM

## 2025-08-22 DIAGNOSIS — E78.2 MIXED HYPERLIPIDEMIA: ICD-10-CM

## 2025-08-22 DIAGNOSIS — Z79.4 TYPE 2 DIABETES MELLITUS WITH OTHER DIABETIC KIDNEY COMPLICATION, WITH LONG-TERM CURRENT USE OF INSULIN (HCC): ICD-10-CM

## 2025-08-22 DIAGNOSIS — F32.4 MAJOR DEPRESSIVE DISORDER IN PARTIAL REMISSION, UNSPECIFIED WHETHER RECURRENT: ICD-10-CM

## 2025-08-22 DIAGNOSIS — K58.0 IRRITABLE BOWEL SYNDROME WITH DIARRHEA: ICD-10-CM

## 2025-08-22 DIAGNOSIS — Z76.89 ENCOUNTER TO ESTABLISH CARE WITH NEW DOCTOR: Primary | ICD-10-CM

## 2025-08-22 DIAGNOSIS — E11.29 TYPE 2 DIABETES MELLITUS WITH OTHER DIABETIC KIDNEY COMPLICATION, WITH LONG-TERM CURRENT USE OF INSULIN (HCC): ICD-10-CM

## 2025-08-22 PROCEDURE — G8420 CALC BMI NORM PARAMETERS: HCPCS | Performed by: STUDENT IN AN ORGANIZED HEALTH CARE EDUCATION/TRAINING PROGRAM

## 2025-08-22 PROCEDURE — 99204 OFFICE O/P NEW MOD 45 MIN: CPT | Performed by: STUDENT IN AN ORGANIZED HEALTH CARE EDUCATION/TRAINING PROGRAM

## 2025-08-22 PROCEDURE — 1159F MED LIST DOCD IN RCRD: CPT | Performed by: STUDENT IN AN ORGANIZED HEALTH CARE EDUCATION/TRAINING PROGRAM

## 2025-08-22 PROCEDURE — G8427 DOCREV CUR MEDS BY ELIG CLIN: HCPCS | Performed by: STUDENT IN AN ORGANIZED HEALTH CARE EDUCATION/TRAINING PROGRAM

## 2025-08-22 PROCEDURE — G2211 COMPLEX E/M VISIT ADD ON: HCPCS | Performed by: STUDENT IN AN ORGANIZED HEALTH CARE EDUCATION/TRAINING PROGRAM

## 2025-08-22 PROCEDURE — 3051F HG A1C>EQUAL 7.0%<8.0%: CPT | Performed by: STUDENT IN AN ORGANIZED HEALTH CARE EDUCATION/TRAINING PROGRAM

## 2025-08-22 PROCEDURE — 1123F ACP DISCUSS/DSCN MKR DOCD: CPT | Performed by: STUDENT IN AN ORGANIZED HEALTH CARE EDUCATION/TRAINING PROGRAM

## 2025-08-22 PROCEDURE — 1036F TOBACCO NON-USER: CPT | Performed by: STUDENT IN AN ORGANIZED HEALTH CARE EDUCATION/TRAINING PROGRAM

## 2025-08-22 RX ORDER — LOPERAMIDE HYDROCHLORIDE 2 MG/1
2 TABLET ORAL 3 TIMES DAILY PRN
Qty: 20 TABLET | Refills: 0 | Status: SHIPPED | OUTPATIENT
Start: 2025-08-22 | End: 2025-09-01

## 2025-08-22 ASSESSMENT — PATIENT HEALTH QUESTIONNAIRE - PHQ9
SUM OF ALL RESPONSES TO PHQ QUESTIONS 1-9: 12
8. MOVING OR SPEAKING SO SLOWLY THAT OTHER PEOPLE COULD HAVE NOTICED. OR THE OPPOSITE, BEING SO FIGETY OR RESTLESS THAT YOU HAVE BEEN MOVING AROUND A LOT MORE THAN USUAL: SEVERAL DAYS
5. POOR APPETITE OR OVEREATING: NOT AT ALL
SUM OF ALL RESPONSES TO PHQ QUESTIONS 1-9: 12
7. TROUBLE CONCENTRATING ON THINGS, SUCH AS READING THE NEWSPAPER OR WATCHING TELEVISION: SEVERAL DAYS
1. LITTLE INTEREST OR PLEASURE IN DOING THINGS: NEARLY EVERY DAY
4. FEELING TIRED OR HAVING LITTLE ENERGY: NOT AT ALL
10. IF YOU CHECKED OFF ANY PROBLEMS, HOW DIFFICULT HAVE THESE PROBLEMS MADE IT FOR YOU TO DO YOUR WORK, TAKE CARE OF THINGS AT HOME, OR GET ALONG WITH OTHER PEOPLE: NOT DIFFICULT AT ALL
SUM OF ALL RESPONSES TO PHQ QUESTIONS 1-9: 12
3. TROUBLE FALLING OR STAYING ASLEEP: SEVERAL DAYS
9. THOUGHTS THAT YOU WOULD BE BETTER OFF DEAD, OR OF HURTING YOURSELF: NOT AT ALL
6. FEELING BAD ABOUT YOURSELF - OR THAT YOU ARE A FAILURE OR HAVE LET YOURSELF OR YOUR FAMILY DOWN: NEARLY EVERY DAY
2. FEELING DOWN, DEPRESSED OR HOPELESS: NEARLY EVERY DAY
SUM OF ALL RESPONSES TO PHQ QUESTIONS 1-9: 12

## 2025-08-25 DIAGNOSIS — F32.4 MAJOR DEPRESSIVE DISORDER IN PARTIAL REMISSION, UNSPECIFIED WHETHER RECURRENT: ICD-10-CM

## 2025-08-25 DIAGNOSIS — I10 PRIMARY HYPERTENSION: ICD-10-CM

## 2025-08-25 DIAGNOSIS — I10 PRIMARY HYPERTENSION: Primary | ICD-10-CM

## 2025-08-25 RX ORDER — CITALOPRAM HYDROBROMIDE 20 MG/1
20 TABLET ORAL DAILY
Qty: 90 TABLET | Refills: 1 | Status: SHIPPED | OUTPATIENT
Start: 2025-08-25

## 2025-08-25 RX ORDER — METOPROLOL SUCCINATE 25 MG/1
25 TABLET, EXTENDED RELEASE ORAL DAILY
Qty: 90 TABLET | Refills: 1 | Status: SHIPPED | OUTPATIENT
Start: 2025-08-25

## 2025-08-25 RX ORDER — HYDROCHLOROTHIAZIDE 25 MG/1
25 TABLET ORAL DAILY
Qty: 30 TABLET | Refills: 1 | Status: SHIPPED | OUTPATIENT
Start: 2025-08-25 | End: 2025-08-26

## 2025-08-26 RX ORDER — HYDROCHLOROTHIAZIDE 25 MG/1
25 TABLET ORAL DAILY
Qty: 90 TABLET | Refills: 0 | Status: SHIPPED | OUTPATIENT
Start: 2025-08-26

## (undated) DEVICE — SET ADMIN NEEDLESS Y SITE RLER CLMP M SPIN LOK 10 GTT LEN

## (undated) DEVICE — GUIDEWIRE URO L150CM DIA0.035IN PTFE NIT HYDRPHLC STR TIP

## (undated) DEVICE — CYSTOSCOPY: Brand: MEDLINE INDUSTRIES, INC.

## (undated) DEVICE — SYRINGE MED 10ML LUERLOCK TIP W/O SFTY DISP

## (undated) DEVICE — GLOVE SURG SZ 7.5 L11.73IN FNGR THK9.8MIL STRW LTX POLYMER

## (undated) DEVICE — SOLUTION IRRIG 3000ML STRL H2O USP UROMATIC PLAS CONT

## (undated) DEVICE — SOLUTION IV IRRIG WATER 1000ML POUR BRL 2F7114